# Patient Record
Sex: MALE | Race: WHITE | Employment: OTHER | URBAN - METROPOLITAN AREA
[De-identification: names, ages, dates, MRNs, and addresses within clinical notes are randomized per-mention and may not be internally consistent; named-entity substitution may affect disease eponyms.]

---

## 2019-10-05 ENCOUNTER — OFFICE VISIT (OUTPATIENT)
Dept: FAMILY MEDICINE CLINIC | Age: 59
End: 2019-10-05

## 2019-10-05 VITALS
SYSTOLIC BLOOD PRESSURE: 136 MMHG | TEMPERATURE: 98.2 F | HEART RATE: 84 BPM | BODY MASS INDEX: 20.69 KG/M2 | DIASTOLIC BLOOD PRESSURE: 88 MMHG | OXYGEN SATURATION: 97 % | WEIGHT: 131.8 LBS | HEIGHT: 67 IN

## 2019-10-05 DIAGNOSIS — J01.00 ACUTE NON-RECURRENT MAXILLARY SINUSITIS: Primary | ICD-10-CM

## 2019-10-05 PROCEDURE — 99213 OFFICE O/P EST LOW 20 MIN: CPT | Performed by: FAMILY MEDICINE

## 2019-10-05 RX ORDER — AZITHROMYCIN 250 MG/1
TABLET, FILM COATED ORAL
Qty: 1 PACKET | Refills: 0 | Status: SHIPPED | OUTPATIENT
Start: 2019-10-05 | End: 2019-10-21 | Stop reason: ALTCHOICE

## 2019-10-05 RX ORDER — PREDNISONE 1 MG/1
TABLET ORAL
Qty: 30 TABLET | Refills: 0 | Status: SHIPPED | OUTPATIENT
Start: 2019-10-05 | End: 2019-10-21 | Stop reason: ALTCHOICE

## 2019-10-05 ASSESSMENT — ENCOUNTER SYMPTOMS
GASTROINTESTINAL NEGATIVE: 1
COUGH: 1

## 2019-10-21 ENCOUNTER — OFFICE VISIT (OUTPATIENT)
Dept: FAMILY MEDICINE CLINIC | Age: 59
End: 2019-10-21

## 2019-10-21 VITALS
TEMPERATURE: 97.9 F | RESPIRATION RATE: 22 BRPM | SYSTOLIC BLOOD PRESSURE: 136 MMHG | HEART RATE: 68 BPM | OXYGEN SATURATION: 98 % | DIASTOLIC BLOOD PRESSURE: 84 MMHG | BODY MASS INDEX: 20.53 KG/M2 | WEIGHT: 130.8 LBS | HEIGHT: 67 IN

## 2019-10-21 DIAGNOSIS — J01.90 ACUTE NON-RECURRENT SINUSITIS, UNSPECIFIED LOCATION: Primary | ICD-10-CM

## 2019-10-21 DIAGNOSIS — R09.82 POSTNASAL DRIP: ICD-10-CM

## 2019-10-21 DIAGNOSIS — R07.0 PAIN IN THROAT: ICD-10-CM

## 2019-10-21 PROCEDURE — 96372 THER/PROPH/DIAG INJ SC/IM: CPT | Performed by: PHYSICIAN ASSISTANT

## 2019-10-21 PROCEDURE — 99214 OFFICE O/P EST MOD 30 MIN: CPT | Performed by: PHYSICIAN ASSISTANT

## 2019-10-21 RX ORDER — TRIAMCINOLONE ACETONIDE 40 MG/ML
40 INJECTION, SUSPENSION INTRA-ARTICULAR; INTRAMUSCULAR ONCE
Status: COMPLETED | OUTPATIENT
Start: 2019-10-21 | End: 2019-10-21

## 2019-10-21 RX ORDER — CHLORPHENIRAMINE MALEATE 4 MG/1
4 TABLET ORAL EVERY 6 HOURS PRN
Qty: 20 TABLET | Refills: 0 | Status: SHIPPED
Start: 2019-10-21 | End: 2021-04-06 | Stop reason: ALTCHOICE

## 2019-10-21 RX ORDER — AMOXICILLIN AND CLAVULANATE POTASSIUM 875; 125 MG/1; MG/1
1 TABLET, FILM COATED ORAL 2 TIMES DAILY
Qty: 20 TABLET | Refills: 0 | Status: SHIPPED | OUTPATIENT
Start: 2019-10-21 | End: 2019-10-31

## 2019-10-21 RX ORDER — FLUTICASONE PROPIONATE 50 MCG
1 SPRAY, SUSPENSION (ML) NASAL DAILY
Qty: 2 BOTTLE | Refills: 1 | Status: SHIPPED
Start: 2019-10-21 | End: 2021-04-06 | Stop reason: ALTCHOICE

## 2019-10-21 RX ADMIN — TRIAMCINOLONE ACETONIDE 40 MG: 40 INJECTION, SUSPENSION INTRA-ARTICULAR; INTRAMUSCULAR at 09:06

## 2021-04-06 ENCOUNTER — OFFICE VISIT (OUTPATIENT)
Dept: FAMILY MEDICINE CLINIC | Age: 61
End: 2021-04-06

## 2021-04-06 VITALS
HEIGHT: 67 IN | OXYGEN SATURATION: 98 % | BODY MASS INDEX: 20.09 KG/M2 | RESPIRATION RATE: 18 BRPM | SYSTOLIC BLOOD PRESSURE: 130 MMHG | TEMPERATURE: 97.7 F | WEIGHT: 128 LBS | DIASTOLIC BLOOD PRESSURE: 78 MMHG | HEART RATE: 88 BPM

## 2021-04-06 DIAGNOSIS — R05.9 COUGH: ICD-10-CM

## 2021-04-06 DIAGNOSIS — Z72.0 TOBACCO ABUSE: ICD-10-CM

## 2021-04-06 DIAGNOSIS — R09.82 POSTNASAL DRIP: ICD-10-CM

## 2021-04-06 DIAGNOSIS — J06.9 ACUTE UPPER RESPIRATORY INFECTION, UNSPECIFIED: Primary | ICD-10-CM

## 2021-04-06 DIAGNOSIS — J01.90 ACUTE NON-RECURRENT SINUSITIS, UNSPECIFIED LOCATION: ICD-10-CM

## 2021-04-06 DIAGNOSIS — J18.9 COMMUNITY ACQUIRED PNEUMONIA OF RIGHT LOWER LOBE OF LUNG: ICD-10-CM

## 2021-04-06 DIAGNOSIS — R09.81 NASAL CONGESTION: ICD-10-CM

## 2021-04-06 PROCEDURE — 3006F CXR DOC REV: CPT | Performed by: PHYSICIAN ASSISTANT

## 2021-04-06 PROCEDURE — 99214 OFFICE O/P EST MOD 30 MIN: CPT | Performed by: PHYSICIAN ASSISTANT

## 2021-04-06 RX ORDER — CHLORPHENIRAMINE MALEATE 4 MG/1
4 TABLET ORAL EVERY 6 HOURS PRN
Qty: 20 TABLET | Refills: 0 | Status: SHIPPED
Start: 2021-04-06 | End: 2021-04-20 | Stop reason: ALTCHOICE

## 2021-04-06 RX ORDER — LEVOFLOXACIN 500 MG/1
500 TABLET, FILM COATED ORAL DAILY
Qty: 10 TABLET | Refills: 0 | Status: SHIPPED | OUTPATIENT
Start: 2021-04-06 | End: 2021-04-16

## 2021-04-06 RX ORDER — BENZONATATE 100 MG/1
100 CAPSULE ORAL 3 TIMES DAILY PRN
Qty: 21 CAPSULE | Refills: 0 | Status: SHIPPED | OUTPATIENT
Start: 2021-04-06 | End: 2021-04-13

## 2021-04-06 RX ORDER — PREDNISONE 20 MG/1
TABLET ORAL
Qty: 18 TABLET | Refills: 0 | Status: SHIPPED
Start: 2021-04-06 | End: 2021-04-20 | Stop reason: ALTCHOICE

## 2021-04-06 NOTE — PROGRESS NOTES
21  Courtney Ellis : 1960 Sex: male  Age 62 y.o. Subjective:  Chief Complaint   Patient presents with    Cough     sinus congestion          HPI:   Courtney Ellis , 62 y.o. male presents to express care for evaluation of sinus congestion, drainage. The patient states that essentially since 2019 he has been dealing with this nasal congestion, drainage. He states that in the last couple of weeks it seems to have gotten worse. The patient has not been on any recent steroids, antibiotics. The patient is not any fevers or chills. Now that the weather is getting a little bit nicer it seems to be getting somewhat better but he still coughing quite a bit. He has quite a bit of chest discomfort when he coughs on the right side. The patient states that does seem to be worse when he is lying down. He is not short of breath. The patient is not having any hemoptysis. The patient is a smoker. ROS:   Unless otherwise stated in this report the patient's positive and negative responses for review of systems for constitutional, eyes, ENT, cardiovascular, respiratory, gastrointestinal, neurological, , musculoskeletal, and integument systems and related systems to the presenting problem are either stated in the history of present illness or were not pertinent or were negative for the symptoms and/or complaints related to the presenting medical problem. Positives and pertinent negatives as per HPI. All others reviewed and are negative. PMH:     Past Medical History:   Diagnosis Date    Tobacco abuse        Past Surgical History:   Procedure Laterality Date    HERNIA REPAIR      TONSILLECTOMY         History reviewed. No pertinent family history.     Medications:     Current Outpatient Medications:     levoFLOXacin (LEVAQUIN) 500 MG tablet, Take 1 tablet by mouth daily for 10 days, Disp: 10 tablet, Rfl: 0    predniSONE (DELTASONE) 20 MG tablet, 3 tablets once daily for 3 days, x4, normal speech  Psychiatric: Cooperative         Testing:     Xr Chest Standard (2 Vw)    Result Date: 4/6/2021  EXAMINATION: TWO XRAY VIEWS OF THE CHEST 4/6/2021 9:29 am COMPARISON: None. HISTORY: ORDERING SYSTEM PROVIDED HISTORY: Acute upper respiratory infection, unspecified TECHNOLOGIST PROVIDED HISTORY: Reason for exam:->cough/congestion FINDINGS: Opacity at the lung bases is greater on the right than on the left. This could represent either acute or chronic inflammatory disease. There is also some pleural thickening on the right as well. Heart and pulmonary vascularity are normal.     Lung opacity which is greatest in the right lower lung which may relate to either acute or chronic disease. Associated pleural thickening also present. Medical Decision Making:     Vital signs reviewed    Past medical history reviewed. Allergies reviewed. Medications reviewed. Patient on arrival does not appear to be in any apparent distress or discomfort. The patient has been seen and evaluated. The patient does not appear to be toxic or lethargic. The patient was sent for a chest x-ray. Did have quite a bit of rhonchi. I personally reviewed the images and there does appear to be evidence of a pneumonia in the right lower lobe area. I discussed this with the patient. The patient will be started on Levaquin, prednisone, chlorphentermine and Tessalon Perles. We will need to have him follow-up in a week for repeat chest x-ray. The patient was also encouraged to stop smoking. The patient was educated on the proper dosage of motrin and tylenol and the appropriate intervals of each. The patient is to increase fluid intake over the next several days. The patient is to use OTC decongestant as needed. The patient is to return to express care or go directly to the emergency department should any of the signs or symptoms worsen.  The patient is to followup with primary care physician in 2-3 days for repeat evaluation. The patient has no other questions or concerns at this time the patient will be discharged home. Clinical Impression:   Shea Thomas was seen today for cough. Diagnoses and all orders for this visit:    Acute upper respiratory infection, unspecified  -     XR CHEST STANDARD (2 VW); Future    Cough    Acute non-recurrent sinusitis, unspecified location    Nasal congestion    Postnasal drip    Tobacco abuse    Community acquired pneumonia of right lower lobe of lung    Other orders  -     levoFLOXacin (LEVAQUIN) 500 MG tablet; Take 1 tablet by mouth daily for 10 days  -     predniSONE (DELTASONE) 20 MG tablet; 3 tablets once daily for 3 days, 2 tablets once daily for 3 days, one tablet once daily for 3 days  -     benzonatate (TESSALON) 100 MG capsule; Take 1 capsule by mouth 3 times daily as needed for Cough  -     chlorpheniramine (ALLER-CHLOR) 4 MG tablet; Take 1 tablet by mouth every 6 hours as needed for Allergies        The patient is to call for any concerns or return if any of the signs or symptoms worsen. The patient is to follow-up with PCP in the next 2-3 days for repeat evaluation repeat assessment or go directly to the emergency department. SIGNATURE: Simone Sanabria III, PA-C    Tobacco use can lead to tobacco/nicotine dependence and serious health problems. Quitting smoking greatly reduces the risk of developing smoking-related diseases. Lowered risk for lung cancer and many other types of cancer. Reduced risk for heart disease, stroke, and peripheral vascular disease (narrowing of the blood vessels outside your heart). Reduced heart disease risk within 1 to 2 years of quitting. Reduced respiratory symptoms, such as coughing, wheezing, and shortness of breath. While these symptoms may not disappear, they do not continue to progress at the same rate among people who quit compared with those who continue to smoke.  Reduced risk of developing some lung diseases (such as

## 2021-04-20 ENCOUNTER — OFFICE VISIT (OUTPATIENT)
Dept: FAMILY MEDICINE CLINIC | Age: 61
End: 2021-04-20

## 2021-04-20 VITALS
BODY MASS INDEX: 20.09 KG/M2 | HEART RATE: 78 BPM | WEIGHT: 128 LBS | HEIGHT: 67 IN | RESPIRATION RATE: 18 BRPM | DIASTOLIC BLOOD PRESSURE: 76 MMHG | TEMPERATURE: 97.5 F | SYSTOLIC BLOOD PRESSURE: 130 MMHG | OXYGEN SATURATION: 99 %

## 2021-04-20 DIAGNOSIS — Z72.0 TOBACCO ABUSE: ICD-10-CM

## 2021-04-20 DIAGNOSIS — R91.8 OPACITIES OF BOTH LUNGS PRESENT ON CHEST X-RAY: Primary | ICD-10-CM

## 2021-04-20 DIAGNOSIS — J18.9 COMMUNITY ACQUIRED PNEUMONIA OF RIGHT LOWER LOBE OF LUNG: ICD-10-CM

## 2021-04-20 DIAGNOSIS — R05.9 COUGH: ICD-10-CM

## 2021-04-20 PROCEDURE — 99214 OFFICE O/P EST MOD 30 MIN: CPT | Performed by: PHYSICIAN ASSISTANT

## 2021-04-20 PROCEDURE — 3006F CXR DOC REV: CPT | Performed by: PHYSICIAN ASSISTANT

## 2021-04-20 RX ORDER — ALBUTEROL SULFATE 90 UG/1
2 AEROSOL, METERED RESPIRATORY (INHALATION) 4 TIMES DAILY PRN
Qty: 1 INHALER | Refills: 0 | Status: SHIPPED
Start: 2021-04-20 | End: 2021-06-14 | Stop reason: ALTCHOICE

## 2021-04-20 RX ORDER — BROMPHENIRAMINE MALEATE, PSEUDOEPHEDRINE HYDROCHLORIDE, AND DEXTROMETHORPHAN HYDROBROMIDE 2; 30; 10 MG/5ML; MG/5ML; MG/5ML
5 SYRUP ORAL 4 TIMES DAILY PRN
Qty: 120 ML | Refills: 0 | Status: SHIPPED
Start: 2021-04-20 | End: 2021-05-14

## 2021-04-20 RX ORDER — LEVOFLOXACIN 500 MG/1
500 TABLET, FILM COATED ORAL DAILY
Qty: 7 TABLET | Refills: 0 | Status: SHIPPED | OUTPATIENT
Start: 2021-04-20 | End: 2021-04-27

## 2021-04-20 NOTE — PROGRESS NOTES
21  Aminta Spurling : 1960 Sex: male  Age 62 y.o. Subjective:  Chief Complaint   Patient presents with    Cough         HPI:   Aminta Spurling , 62 y.o. male presents to [facility] care for evaluation of repeat evaluation of a pneumonia. The patient was seen and evaluated on 2021. Had a chest x-ray performed that showed evidence of right lower lobe pneumonia. The patient was placed on Levaquin. The patient states because of the mucus and increased congestion he is having a hard time swallowing the pills but he was able to complete the entire round of antibiotics. The patient did not miss any doses. The patient was given prednisone, Tessalon Perles and chlorphentermine. The patient is here for follow-up and repeat evaluation. Patient states that the cough is much better at this point. The patient is not having any back pain or flank pain. The patient has noted some nasal congestion upper respiratory symptoms waxing waning since 2019. ROS:   Unless otherwise stated in this report the patient's positive and negative responses for review of systems for constitutional, eyes, ENT, cardiovascular, respiratory, gastrointestinal, neurological, , musculoskeletal, and integument systems and related systems to the presenting problem are either stated in the history of present illness or were not pertinent or were negative for the symptoms and/or complaints related to the presenting medical problem. Positives and pertinent negatives as per HPI. All others reviewed and are negative. PMH:     Past Medical History:   Diagnosis Date    Tobacco abuse        Past Surgical History:   Procedure Laterality Date    HERNIA REPAIR      TONSILLECTOMY         History reviewed. No pertinent family history.     Medications:     Current Outpatient Medications:     albuterol sulfate  (90 Base) MCG/ACT inhaler, Inhale 2 puffs into the lungs 4 times daily as needed for Wheezing, Disp: 1 Inhaler, Rfl: 0    levoFLOXacin (LEVAQUIN) 500 MG tablet, Take 1 tablet by mouth daily for 7 days, Disp: 7 tablet, Rfl: 0    brompheniramine-pseudoephedrine-DM 2-30-10 MG/5ML syrup, Take 5 mLs by mouth 4 times daily as needed for Congestion or Cough, Disp: 120 mL, Rfl: 0    Allergies: Allergies   Allergen Reactions    Seasonal        Social History:     Social History     Tobacco Use    Smoking status: Current Every Day Smoker     Packs/day: 1.00    Smokeless tobacco: Never Used   Substance Use Topics    Alcohol use: Yes     Comment: 8 beers daily    Drug use: Yes     Types: Marijuana       Patient lives at home. Physical Exam:     Vitals:    04/20/21 0940   BP: 130/76   Pulse: 78   Resp: 18   Temp: 97.5 °F (36.4 °C)   SpO2: 99%   Weight: 128 lb (58.1 kg)   Height: 5' 7\" (1.702 m)       Exam:  Physical Exam  Nurse's notes and vital signs reviewed. The patient is not hypoxic. ? General: Alert, no acute distress, patient resting comfortably Patient is not toxic or lethargic. Skin: Warm, intact, no pallor noted. There is no evidence of rash at this time. Head: Normocephalic, atraumatic  Eye: Normal conjunctiva  Ears, Nose, Throat: Right tympanic membrane clear, left tympanic membrane clear. No drainage or discharge noted. No pre- or post-auricular tenderness, erythema, or swelling noted. Nasal congestion  Posterior oropharynx shows no erythema, tonsillar hypertrophy, or exudate. the uvula is midline. No trismus or drooling is noted. Moist mucous membranes. Neck: No anterior/posterior lymphadenopathy noted. No erythema, no masses, no fluctuance or induration noted. No meningeal signs. Cardiovascular: Regular Rate and Rhythm  Respiratory: No acute distress, rhonchi noted throughout the right mid to the right lower lobe area, no wheezing, no crackles. No stridor or retractions are noted.   Neurological: A&O x4, normal speech  Psychiatric: Cooperative         Testing:     Xr Chest Standard (2 Vw)    Result Date: 4/20/2021  EXAMINATION: TWO XRAY VIEWS OF THE CHEST 4/20/2021 9:23 am COMPARISON: None. HISTORY: ORDERING SYSTEM PROVIDED HISTORY: Cough TECHNOLOGIST PROVIDED HISTORY: Reason for exam:->follow up from [facility] FINDINGS: Stable bilateral lung opacities with a likely interstitial component. This could relate to either acute or chronic disease. Normal heart and pulmonary vascularity. Stable pleural thickening on the right. Stable bilateral lung opacities which could relate to either acute or chronic disease. Xr Chest Standard (2 Vw)    Result Date: 4/6/2021  EXAMINATION: TWO XRAY VIEWS OF THE CHEST 4/6/2021 9:29 am COMPARISON: None. HISTORY: ORDERING SYSTEM PROVIDED HISTORY: Acute upper respiratory infection, unspecified TECHNOLOGIST PROVIDED HISTORY: Reason for exam:->cough/congestion FINDINGS: Opacity at the lung bases is greater on the right than on the left. This could represent either acute or chronic inflammatory disease. There is also some pleural thickening on the right as well. Heart and pulmonary vascularity are normal.     Lung opacity which is greatest in the right lower lung which may relate to either acute or chronic disease. Associated pleural thickening also present. Medical Decision Making:     Vital signs reviewed    Past medical history reviewed. Allergies reviewed. Medications reviewed. Patient on arrival does not appear to be in any apparent distress or discomfort. The patient has been seen and evaluated. The patient does not appear to be toxic or lethargic. The patient will be sent for repeat chest x-ray. The repeat chest x-ray does show evidence of stable bilateral lung opacities. I personally reviewed the images. Does seem to be slightly more pronounced from previous. The patient has had this ongoing for quite some time. The patient will be referred to pulmonology. We will continue out the antibiotic for 7 days.     The patient is to follow-up with PCP. The patient does not have health insurance and will work on obtaining this. The patient will also be given a inhaler and Bromfed. The patient is to return to express care or go directly to the emergency department should any of the signs or symptoms worsen. The patient is to followup with primary care physician in 2-3 days for repeat evaluation. The patient has no other questions or concerns at this time the patient will be discharged home. Clinical Impression:   Robert Baez was seen today for cough. Diagnoses and all orders for this visit:    Opacities of both lungs present on chest x-ray  -     [facility] ([facility]) - Riya Perez MD, PulmonaryCarla    Cough  -     XR CHEST STANDARD (2 VW); Future    Community acquired pneumonia of right lower lobe of lung  -     [facility] ([facility]) - Riya Perez MD, PulmonaryCarla    Tobacco abuse    Other orders  -     albuterol sulfate  (90 Base) MCG/ACT inhaler; Inhale 2 puffs into the lungs 4 times daily as needed for Wheezing  -     levoFLOXacin (LEVAQUIN) 500 MG tablet; Take 1 tablet by mouth daily for 7 days  -     brompheniramine-pseudoephedrine-DM 2-30-10 MG/5ML syrup; Take 5 mLs by mouth 4 times daily as needed for Congestion or Cough        The patient is to call for any concerns or return if any of the signs or symptoms worsen. The patient is to follow-up with PCP in the next 2-3 days for repeat evaluation repeat assessment or go directly to the emergency department. SIGNATURE: Olayinka Emery III, PA-C    Tobacco use can lead to tobacco/nicotine dependence and serious health problems. Quitting smoking greatly reduces the risk of developing smoking-related diseases. Lowered risk for lung cancer and many other types of cancer. Reduced risk for heart disease, stroke, and peripheral vascular disease (narrowing of the blood vessels outside your heart). Reduced heart disease risk within 1 to 2 years of quitting.  Reduced respiratory symptoms, such as coughing, wheezing, and shortness of breath. While these symptoms may not disappear, they do not continue to progress at the same rate among people who quit compared with those who continue to smoke. Reduced risk of developing some lung diseases (such as chronic obstructive pulmonary disease, also known as COPD, one of the leading causes of death in the [facility]). Tobacco/nicotine dependence is a condition that often requires repeated treatments, but there are helpful treatments and resources for quitting.

## 2021-04-27 ENCOUNTER — TELEPHONE (OUTPATIENT)
Dept: FAMILY MEDICINE CLINIC | Age: 61
End: 2021-04-27

## 2021-04-28 DIAGNOSIS — R91.8 OPACITIES OF BOTH LUNGS PRESENT ON CHEST X-RAY: Primary | ICD-10-CM

## 2021-05-06 ENCOUNTER — TELEPHONE (OUTPATIENT)
Dept: FAMILY MEDICINE CLINIC | Age: 61
End: 2021-05-06

## 2021-05-06 NOTE — TELEPHONE ENCOUNTER
I spoke with the patient states that he is now coughing up some blood. The patient is having some back pain. The patient was to follow-up with pulmonology. The patient was encouraged to go directly to the ED for further evaluation and assessment.   Likely needs CT scan performed

## 2021-05-07 ENCOUNTER — HOSPITAL ENCOUNTER (EMERGENCY)
Age: 61
Discharge: HOME | End: 2021-05-07
Attending: EMERGENCY MEDICINE
Payer: COMMERCIAL

## 2021-05-07 ENCOUNTER — APPOINTMENT (OUTPATIENT)
Dept: CT IMAGING | Age: 61
End: 2021-05-07
Payer: COMMERCIAL

## 2021-05-07 VITALS
OXYGEN SATURATION: 98 % | HEART RATE: 70 BPM | WEIGHT: 132 LBS | DIASTOLIC BLOOD PRESSURE: 67 MMHG | HEIGHT: 67 IN | TEMPERATURE: 97.9 F | RESPIRATION RATE: 14 BRPM | BODY MASS INDEX: 20.72 KG/M2 | SYSTOLIC BLOOD PRESSURE: 138 MMHG

## 2021-05-07 DIAGNOSIS — Z20.822 COVID-19 VIRUS NOT DETECTED: ICD-10-CM

## 2021-05-07 DIAGNOSIS — R91.8 LUNG MASS: Primary | ICD-10-CM

## 2021-05-07 DIAGNOSIS — R59.0 SUPRACLAVICULAR LYMPHADENOPATHY: ICD-10-CM

## 2021-05-07 DIAGNOSIS — Z72.0 TOBACCO ABUSE: ICD-10-CM

## 2021-05-07 LAB
ALBUMIN SERPL-MCNC: 3.8 G/DL (ref 3.5–5.2)
ALP BLD-CCNC: 102 U/L (ref 40–129)
ALT SERPL-CCNC: 10 U/L (ref 0–40)
ANION GAP SERPL CALCULATED.3IONS-SCNC: 9 MMOL/L (ref 7–16)
AST SERPL-CCNC: 22 U/L (ref 0–39)
BASOPHILS ABSOLUTE: 0.04 E9/L (ref 0–0.2)
BASOPHILS RELATIVE PERCENT: 0.7 % (ref 0–2)
BILIRUB SERPL-MCNC: 0.5 MG/DL (ref 0–1.2)
BILIRUBIN URINE: NEGATIVE
BLOOD, URINE: NEGATIVE
BUN BLDV-MCNC: 7 MG/DL (ref 6–20)
CALCIUM SERPL-MCNC: 9.3 MG/DL (ref 8.6–10.2)
CHLORIDE BLD-SCNC: 90 MMOL/L (ref 98–107)
CLARITY: CLEAR
CO2: 30 MMOL/L (ref 22–29)
COLOR: YELLOW
CREAT SERPL-MCNC: 0.6 MG/DL (ref 0.7–1.2)
EOSINOPHILS ABSOLUTE: 0.15 E9/L (ref 0.05–0.5)
EOSINOPHILS RELATIVE PERCENT: 2.5 % (ref 0–6)
GFR AFRICAN AMERICAN: >60
GFR NON-AFRICAN AMERICAN: >60 ML/MIN/1.73
GLUCOSE BLD-MCNC: 90 MG/DL (ref 74–99)
GLUCOSE URINE: NEGATIVE MG/DL
HCT VFR BLD CALC: 41.2 % (ref 37–54)
HEMOGLOBIN: 13.9 G/DL (ref 12.5–16.5)
IMMATURE GRANULOCYTES #: 0.03 E9/L
IMMATURE GRANULOCYTES %: 0.5 % (ref 0–5)
INR BLD: 1.1
KETONES, URINE: NEGATIVE MG/DL
LACTIC ACID: 1.1 MMOL/L (ref 0.5–2.2)
LEUKOCYTE ESTERASE, URINE: NEGATIVE
LIPASE: 12 U/L (ref 13–60)
LYMPHOCYTES ABSOLUTE: 1.66 E9/L (ref 1.5–4)
LYMPHOCYTES RELATIVE PERCENT: 28.2 % (ref 20–42)
MCH RBC QN AUTO: 29.6 PG (ref 26–35)
MCHC RBC AUTO-ENTMCNC: 33.7 % (ref 32–34.5)
MCV RBC AUTO: 87.8 FL (ref 80–99.9)
MONOCYTES ABSOLUTE: 0.69 E9/L (ref 0.1–0.95)
MONOCYTES RELATIVE PERCENT: 11.7 % (ref 2–12)
NEUTROPHILS ABSOLUTE: 3.32 E9/L (ref 1.8–7.3)
NEUTROPHILS RELATIVE PERCENT: 56.4 % (ref 43–80)
NITRITE, URINE: NEGATIVE
PDW BLD-RTO: 11.9 FL (ref 11.5–15)
PH UA: 7 (ref 5–9)
PLATELET # BLD: 272 E9/L (ref 130–450)
PMV BLD AUTO: 7.5 FL (ref 7–12)
POTASSIUM REFLEX MAGNESIUM: 4.6 MMOL/L (ref 3.5–5)
PROTEIN UA: NEGATIVE MG/DL
PROTHROMBIN TIME: 11.7 SEC (ref 9.3–12.4)
RBC # BLD: 4.69 E12/L (ref 3.8–5.8)
SARS-COV-2, NAAT: NOT DETECTED
SODIUM BLD-SCNC: 129 MMOL/L (ref 132–146)
SPECIFIC GRAVITY UA: 1.01 (ref 1–1.03)
TOTAL PROTEIN: 7.8 G/DL (ref 6.4–8.3)
TROPONIN: <0.01 NG/ML (ref 0–0.03)
UROBILINOGEN, URINE: 0.2 E.U./DL
WBC # BLD: 5.9 E9/L (ref 4.5–11.5)

## 2021-05-07 PROCEDURE — 96374 THER/PROPH/DIAG INJ IV PUSH: CPT

## 2021-05-07 PROCEDURE — 84484 ASSAY OF TROPONIN QUANT: CPT

## 2021-05-07 PROCEDURE — 99284 EMERGENCY DEPT VISIT MOD MDM: CPT

## 2021-05-07 PROCEDURE — 36415 COLL VENOUS BLD VENIPUNCTURE: CPT

## 2021-05-07 PROCEDURE — 71275 CT ANGIOGRAPHY CHEST: CPT

## 2021-05-07 PROCEDURE — 6360000002 HC RX W HCPCS: Performed by: EMERGENCY MEDICINE

## 2021-05-07 PROCEDURE — 6360000004 HC RX CONTRAST MEDICATION: Performed by: RADIOLOGY

## 2021-05-07 PROCEDURE — 85025 COMPLETE CBC W/AUTO DIFF WBC: CPT

## 2021-05-07 PROCEDURE — 2580000003 HC RX 258: Performed by: EMERGENCY MEDICINE

## 2021-05-07 PROCEDURE — 94664 DEMO&/EVAL PT USE INHALER: CPT

## 2021-05-07 PROCEDURE — 93005 ELECTROCARDIOGRAM TRACING: CPT | Performed by: EMERGENCY MEDICINE

## 2021-05-07 PROCEDURE — 87040 BLOOD CULTURE FOR BACTERIA: CPT

## 2021-05-07 PROCEDURE — 83605 ASSAY OF LACTIC ACID: CPT

## 2021-05-07 PROCEDURE — 94640 AIRWAY INHALATION TREATMENT: CPT

## 2021-05-07 PROCEDURE — 85610 PROTHROMBIN TIME: CPT

## 2021-05-07 PROCEDURE — 81003 URINALYSIS AUTO W/O SCOPE: CPT

## 2021-05-07 PROCEDURE — 87635 SARS-COV-2 COVID-19 AMP PRB: CPT

## 2021-05-07 PROCEDURE — 6370000000 HC RX 637 (ALT 250 FOR IP): Performed by: EMERGENCY MEDICINE

## 2021-05-07 PROCEDURE — 83690 ASSAY OF LIPASE: CPT

## 2021-05-07 PROCEDURE — 80053 COMPREHEN METABOLIC PANEL: CPT

## 2021-05-07 RX ORDER — METHYLPREDNISOLONE SODIUM SUCCINATE 125 MG/2ML
125 INJECTION, POWDER, LYOPHILIZED, FOR SOLUTION INTRAMUSCULAR; INTRAVENOUS ONCE
Status: COMPLETED | OUTPATIENT
Start: 2021-05-07 | End: 2021-05-07

## 2021-05-07 RX ORDER — 0.9 % SODIUM CHLORIDE 0.9 %
1000 INTRAVENOUS SOLUTION INTRAVENOUS ONCE
Status: COMPLETED | OUTPATIENT
Start: 2021-05-07 | End: 2021-05-07

## 2021-05-07 RX ORDER — IPRATROPIUM BROMIDE AND ALBUTEROL SULFATE 2.5; .5 MG/3ML; MG/3ML
3 SOLUTION RESPIRATORY (INHALATION) ONCE
Status: COMPLETED | OUTPATIENT
Start: 2021-05-07 | End: 2021-05-07

## 2021-05-07 RX ADMIN — IOPAMIDOL 75 ML: 755 INJECTION, SOLUTION INTRAVENOUS at 11:01

## 2021-05-07 RX ADMIN — IPRATROPIUM BROMIDE AND ALBUTEROL SULFATE 3 AMPULE: .5; 3 SOLUTION RESPIRATORY (INHALATION) at 10:34

## 2021-05-07 RX ADMIN — SODIUM CHLORIDE 1000 ML: 9 INJECTION, SOLUTION INTRAVENOUS at 10:22

## 2021-05-07 RX ADMIN — METHYLPREDNISOLONE SODIUM SUCCINATE 125 MG: 125 INJECTION, POWDER, FOR SOLUTION INTRAMUSCULAR; INTRAVENOUS at 10:22

## 2021-05-07 NOTE — ED PROVIDER NOTES
Negative Negative mg/dL    Urobilinogen, Urine 0.2 <2.0 E.U./dL    Nitrite, Urine Negative Negative    Leukocyte Esterase, Urine Negative Negative   Troponin   Result Value Ref Range    Troponin <0.01 0.00 - 0.03 ng/mL   EKG 12 Lead   Result Value Ref Range    Ventricular Rate 68 BPM    Atrial Rate 68 BPM    P-R Interval 166 ms    QRS Duration 96 ms    Q-T Interval 406 ms    QTc Calculation (Bazett) 431 ms    P Axis 78 degrees    R Axis 101 degrees    T Axis 66 degrees   ,       RADIOLOGY:  Interpreted by Radiologist unless otherwise specified  CTA PULMONARY W CONTRAST   Final Result   No evidence of pulmonary embolism. Right lower lobe/right hilar mass measuring approximately 5 cm with multiple   enlarged mediastinal lymph nodes. Findings are concerning for neoplastic   disease. Further evaluation with PET-CT or image guided biopsy recommended. Severe bilateral emphysema. Moderate right-sided pleural effusion.                          ------------------------- NURSING NOTES AND VITALS REVIEWED ---------------------------   The nursing notes within the ED encounter and vital signs as below have been reviewed by myself  /85   Pulse 69   Temp 97.9 °F (36.6 °C)   Resp 12   Ht 5' 7\" (1.702 m)   Wt 132 lb (59.9 kg)   SpO2 98%   BMI 20.67 kg/m²     Oxygen Saturation Interpretation: Normal    The cardiac monitor revealed NSR with a heart rate in the 80s as interpreted by me. The cardiac monitor was ordered secondary to the patient's heart rate and to monitor the patient for dysrhythmia. CPT 18756    The patients available past medical records and past encounters were reviewed.         ------------------------------ ED COURSE/MEDICAL DECISION MAKING----------------------  Medications   methylPREDNISolone sodium (SOLU-MEDROL) injection 125 mg (125 mg Intravenous Given 5/7/21 1022)   ipratropium-albuterol (DUONEB) nebulizer solution 3 ampule (3 ampules Inhalation Given 5/7/21 1034)   0.9 % software.  Every effort was made to ensure accuracy; however, inadvertent computerized transcription errors may be present       José Miguel Route, DO  05/07/21 0790

## 2021-05-08 LAB
EKG ATRIAL RATE: 68 BPM
EKG P AXIS: 78 DEGREES
EKG P-R INTERVAL: 166 MS
EKG Q-T INTERVAL: 406 MS
EKG QRS DURATION: 96 MS
EKG QTC CALCULATION (BAZETT): 431 MS
EKG R AXIS: 101 DEGREES
EKG T AXIS: 66 DEGREES
EKG VENTRICULAR RATE: 68 BPM

## 2021-05-11 ENCOUNTER — TELEPHONE (OUTPATIENT)
Dept: FAMILY MEDICINE CLINIC | Age: 61
End: 2021-05-11

## 2021-05-11 NOTE — TELEPHONE ENCOUNTER
Provider called patient to check on how he was doing after recent ER visit. Patient states he will be seeing Pulmonology tomorrow 5/12/21. We offered to have the patient establish with a PCP here in the office, he declined and will follow up with Dr. Darren Dillard. Patient advised to contact us if needed.

## 2021-05-12 ENCOUNTER — OFFICE VISIT (OUTPATIENT)
Dept: PULMONOLOGY | Age: 61
End: 2021-05-12
Payer: COMMERCIAL

## 2021-05-12 DIAGNOSIS — J90 PLEURAL EFFUSION: Primary | ICD-10-CM

## 2021-05-12 DIAGNOSIS — R91.8 LUNG MASS: ICD-10-CM

## 2021-05-12 LAB
BLOOD CULTURE, ROUTINE: NORMAL
CULTURE, BLOOD 2: NORMAL

## 2021-05-12 PROCEDURE — 99203 OFFICE O/P NEW LOW 30 MIN: CPT | Performed by: INTERNAL MEDICINE

## 2021-05-12 PROCEDURE — G8420 CALC BMI NORM PARAMETERS: HCPCS | Performed by: INTERNAL MEDICINE

## 2021-05-12 PROCEDURE — 4004F PT TOBACCO SCREEN RCVD TLK: CPT | Performed by: INTERNAL MEDICINE

## 2021-05-12 PROCEDURE — 3017F COLORECTAL CA SCREEN DOC REV: CPT | Performed by: INTERNAL MEDICINE

## 2021-05-12 PROCEDURE — G8428 CUR MEDS NOT DOCUMENT: HCPCS | Performed by: INTERNAL MEDICINE

## 2021-05-12 PROCEDURE — 99204 OFFICE O/P NEW MOD 45 MIN: CPT | Performed by: INTERNAL MEDICINE

## 2021-05-12 NOTE — PROGRESS NOTES
Patient will be scheduled for a thoracentesis first then a bronchoscopy with EBUS after. Patient will need COVID test prior to thoracentesis and bronch. Patient was agreeable with the treatment. Patient not taking any anti coagulation medications.

## 2021-05-12 NOTE — PROGRESS NOTES
Pulmonary [facility]                             Pulmonary Consult/Progress Note :          Patient: Santo Lewis  MRN: [id number]  : 1960              Reason for Consultation:Lung mass  CC : SOB   HPI:   Santo Lewis is a 62y.o. year old who smoke for 40 years and he smoke 1 pack daily ,40 PPY and now down to 1/2 pack daily   He was in ER last with SOB and he had CT chest and shows lung mass with effusion     He had small streak of bloods with sputum and some times small spots of bright blood     Patient also was treated with antibiotics in urgent cares and course of steroids as he state and he felt much better    He denies any further hemoptysis he denies any chest pain his shortness of breath is at baseline    He is using albuterol as needed    PAST MEDICAL HISTORY:   Past Medical History:   Diagnosis Date    Tobacco abuse        PAST SURGICAL HISTORY:   Past Surgical History:   Procedure Laterality Date    HERNIA REPAIR      TONSILLECTOMY         FAMILY HISTORY:   No family history on file.     SOCIAL HISTORY:   Social History     Socioeconomic History    Marital status:      Spouse name: Not on file    Number of children: Not on file    Years of education: Not on file    Highest education level: Not on file   Occupational History    Not on file   Social Needs    Financial resource strain: Not on file    Food insecurity     Worry: Not on file     Inability: Not on file    Transportation needs     Medical: Not on file     Non-medical: Not on file   Tobacco Use    Smoking status: Current Every Day Smoker     Packs/day: 1.00    Smokeless tobacco: Never Used   Substance and Sexual Activity    Alcohol use: Yes     Comment: 8 beers daily    Drug use: Yes     Types: Marijuana    Sexual activity: Not on file   Lifestyle    Physical activity     Days per week: Not on file     Minutes per session: Not on file    Stress: Not on file Relationships    Social connections     Talks on phone: Not on file     Gets together: Not on file     Attends Faith service: Not on file     Active member of club or organization: Not on file     Attends meetings of clubs or organizations: Not on file     Relationship status: Not on file    Intimate partner violence     Fear of current or ex partner: Not on file     Emotionally abused: Not on file     Physically abused: Not on file     Forced sexual activity: Not on file   Other Topics Concern    Not on file   Social History Narrative    Not on file     Social History     Tobacco Use   Smoking Status Current Every Day Smoker    Packs/day: 1.00   Smokeless Tobacco Never Used     Social History     Substance and Sexual Activity   Alcohol Use Yes    Comment: 8 beers daily     Social History     Substance and Sexual Activity   Drug Use Yes    Types: Marijuana           HOME MEDICATIONS:  Prior to Admission medications    Medication Sig Start Date End Date Taking? Authorizing Provider   albuterol sulfate  (90 Base) MCG/ACT inhaler Inhale 2 puffs into the lungs 4 times daily as needed for Wheezing 4/20/21   WANDY Cleaning III   brompheniramine-pseudoephedrine-DM 2-30-10 MG/5ML syrup Take 5 mLs by mouth 4 times daily as needed for Congestion or Cough 4/20/21   WANDY Cleaning III       CURRENT MEDICATIONS:  No current facility-administered medications for this visit.      IV MEDICATIONS:      ALLERGIES:  Allergies   Allergen Reactions    Seasonal        REVIEW OF SYSTEMS:  General ROS:  No weight loss ,no fatigue     ENT ROS:   No Sore throat ,no lymphoadenopathy,no nasal stuffiness     Hematological and Lymphatic ROS:   No ecchymosis ,no tendency to bleed  Respiratory ROS:   Shortness of breath  Cardiovascular ROS:   No CP,No Palpitation   Gastrointestinal ROS:   No Gi bleed,no nausea or vomiting      - Musculoskeletal ROS:      - no joint swelling ,no joint pain   Neurological ROS:     -no regarding smoking and the risk of Lung cancer and COPD and respiratory failure           Thank you very much for allowing me to participate in the care of this pleasant patient , should you have any questions ,please do not hesitate to contact me      Dudley Dong  Pulmonary&Critical Care Medicine   Director of [address] [place] [address] Director of [address] [place] [address]   Professor Katerin Ontiveros    NOTE: This report was transcribed using voice recognition software. Every effort was made to ensure accuracy; however, inadvertent computerized transcription errors may be present.

## 2021-05-14 RX ORDER — IBUPROFEN 200 MG
200 TABLET ORAL EVERY 6 HOURS PRN
COMMUNITY
End: 2021-05-28

## 2021-05-14 NOTE — PROGRESS NOTES
[address] 37 PRE-ADMISSION TESTING GENERAL INSTRUCTIONS- [facility]-phone number:[phone]    GENERAL INSTRUCTIONS  [x] Antibacterial Soap shower Night before and/or AM of Surgery  [] Felipe wipe instruction sheet and wipes given. [] Nothing by mouth after midnight, including gum, candy, mints, or water.  [] You may brush your teeth, gargle, but do NOT swallow water. []Hibiclens shower  the night before and the morning of surgery. Do not use             Hibiclens on your face or head. [x]No smoking, chewing tobacco, illegal drugs, or alcohol within 24 hours of your surgery. [] Jewelry, valuables or body piercing's should not be brought to the hospital. All body and/or tongue piercing's must be removed prior to arriving to hospital.  ALL hair pins must be removed. [] Do not wear makeup, lotions, powders, deodorant. Nail polish as directed by the nurse. [] Arrange transportation with a responsible adult  to and from the hospital. If you do not have a responsible adult  to transport you, you will need to make arrangements with a medical transportation company (i.e. [employer]. A Uber/taxi/bus is not appropriate unless you are accompanied by a responsible adult ). Arrange for someone to be with you for the remainder of the day and for 24 hours after your procedure due to having had anesthesia. Who will be your  for transportation?______local____________   Who will be staying with you for 24 hrs after your procedure?__________________  [] Bring insurance card and photo ID.  [] Transfusion Bracelet: Please bring with you to hospital, day of surgery  [] Bring urine specimen day of surgery. Any small container is acceptable. [x] Use inhalers the morning of surgery and bring with you to hospital.  [] Bring copy of living will or healthcare power of  papers to be placed in your electronic record.   [] CPAP/BI-PAP: Please bring your machine if you are to spend the night in the hospital.     PARKING INSTRUCTIONS:   [x] Arrival Time:______0900 needs rapid covid_______  · [x] Parking lot '\"I\"  is located on [facility] (the corner of [facility] and [facility]). To enter, press the button and the gate will lift. A free token will be provided to exit the lot. One car per patient is allowed to park in this lot. All other cars are to park on [address] [place] [address] either in the parking garage or the handicap lot. [] To reach the [facility] lobby from [address] [place] [address], upon entering the hospital, take elevator B to the 3rd floor. EDUCATION INSTRUCTIONS:      [] Knee or hip replacement booklet & exercise pamphlets given. [] Avi 77 placed in chart. [] Pre-admission Testing educational folder given  [] Incentive Spirometry,coughing & deep breathing exercises reviewed. []Medication information sheet(s)   []Fluoroscopy-Xray used in surgery reviewed with patient. Educational pamphlet placed in chart. []Pain: Post-op pain is normal and to be expected. You will be asked to rate your pain from 0-10(a zero is not acceptable-education is needed). Your post-op pain goal is:  [] Ask your nurse for your pain medication. [] Joint camp offered. [] Joint replacement booklets given. [] Other:___________________________    MEDICATION INSTRUCTIONS:   []Bring a complete list of your medications, please write the last time you took the medicine, give this list to the nurse.   [] Take the following medications the morning of surgery with 1-2 ounces of water:   [] Stop herbal supplements and vitamins 5 days before your surgery. [] DO NOT take any diabetic medicine the morning of surgery. Follow instructions for insulin the day before surgery. [] If you are diabetic and your blood sugar is low or you feel symptomatic, you may drink 1-2 ounces of apple juice or take a glucose tablet.   The morning of your procedure, you may call the pre-op area if you have concerns about your blood sugar [phone]. [x] Use your inhalers the morning of surgery. Bring your emergency inhaler with you day of surgery. [x] Follow physician instructions regarding any blood thinners you may be taking. Stop ibuprofen  WHAT TO EXPECT:  [x] The day of surgery you will be greeted and checked in by the [employer].  In addition, you will be registered in the [facility] by a Patient Access Representative. Please bring your photo ID and insurance card. A nurse will greet you in accordance to the time you are needed in the pre-op area to prepare you for surgery. Please do not be discouraged if you are not greeted in the order you arrive as there are many variables that are involved in patient preparation. Your patience is greatly appreciated as you wait for your nurse. Please bring in items such as: books, magazines, newspapers, electronics, or any other items  to occupy your time in the waiting area. []  Delays may occur with surgery and staff will make a sincere effort to keep you informed of delays. If any delays occur with your procedure, we apologize ahead of time for your inconvenience as we recognize the value of your time.

## 2021-05-17 ENCOUNTER — APPOINTMENT (OUTPATIENT)
Dept: GENERAL RADIOLOGY | Age: 61
End: 2021-05-17
Attending: INTERNAL MEDICINE
Payer: COMMERCIAL

## 2021-05-17 ENCOUNTER — HOSPITAL ENCOUNTER (OUTPATIENT)
Age: 61
Setting detail: OUTPATIENT SURGERY
Discharge: HOME | End: 2021-05-17
Attending: INTERNAL MEDICINE | Admitting: INTERNAL MEDICINE
Payer: COMMERCIAL

## 2021-05-17 ENCOUNTER — HOSPITAL ENCOUNTER (OUTPATIENT)
Dept: ULTRASOUND IMAGING | Age: 61
Discharge: HOME | End: 2021-05-19
Payer: COMMERCIAL

## 2021-05-17 VITALS
WEIGHT: 132 LBS | DIASTOLIC BLOOD PRESSURE: 71 MMHG | RESPIRATION RATE: 20 BRPM | BODY MASS INDEX: 20.72 KG/M2 | OXYGEN SATURATION: 96 % | HEIGHT: 67 IN | TEMPERATURE: 97.6 F | SYSTOLIC BLOOD PRESSURE: 105 MMHG | HEART RATE: 80 BPM

## 2021-05-17 DIAGNOSIS — J90 PLEURAL EFFUSION: ICD-10-CM

## 2021-05-17 DIAGNOSIS — Z01.812 PRE-PROCEDURE LAB EXAM: Primary | ICD-10-CM

## 2021-05-17 LAB
FLUID TYPE: NORMAL
GLUCOSE, FLUID: 106 MG/DL
LD, FLUID: 172 U/L
PROTEIN FLUID: 5.2 G/DL
SARS-COV-2, NAAT: NOT DETECTED

## 2021-05-17 PROCEDURE — 87205 SMEAR GRAM STAIN: CPT

## 2021-05-17 PROCEDURE — 7100000011 HC PHASE II RECOVERY - ADDTL 15 MIN: Performed by: INTERNAL MEDICINE

## 2021-05-17 PROCEDURE — 87070 CULTURE OTHR SPECIMN AEROBIC: CPT

## 2021-05-17 PROCEDURE — 2709999900 HC NON-CHARGEABLE SUPPLY: Performed by: INTERNAL MEDICINE

## 2021-05-17 PROCEDURE — 88112 CYTOPATH CELL ENHANCE TECH: CPT

## 2021-05-17 PROCEDURE — 7100000010 HC PHASE II RECOVERY - FIRST 15 MIN

## 2021-05-17 PROCEDURE — 84157 ASSAY OF PROTEIN OTHER: CPT

## 2021-05-17 PROCEDURE — 87116 MYCOBACTERIA CULTURE: CPT

## 2021-05-17 PROCEDURE — 88305 TISSUE EXAM BY PATHOLOGIST: CPT

## 2021-05-17 PROCEDURE — 87635 SARS-COV-2 COVID-19 AMP PRB: CPT

## 2021-05-17 PROCEDURE — 71045 X-RAY EXAM CHEST 1 VIEW: CPT

## 2021-05-17 PROCEDURE — 32555 ASPIRATE PLEURA W/ IMAGING: CPT | Performed by: INTERNAL MEDICINE

## 2021-05-17 PROCEDURE — 32555 ASPIRATE PLEURA W/ IMAGING: CPT

## 2021-05-17 PROCEDURE — 83615 LACTATE (LD) (LDH) ENZYME: CPT

## 2021-05-17 PROCEDURE — 82947 ASSAY GLUCOSE BLOOD QUANT: CPT

## 2021-05-17 PROCEDURE — 7100000010 HC PHASE II RECOVERY - FIRST 15 MIN: Performed by: INTERNAL MEDICINE

## 2021-05-17 PROCEDURE — 3609027000 HC THORACENTESIS W/ULTRASOUND: Performed by: INTERNAL MEDICINE

## 2021-05-17 PROCEDURE — 87206 SMEAR FLUORESCENT/ACID STAI: CPT

## 2021-05-17 PROCEDURE — 2500000003 HC RX 250 WO HCPCS: Performed by: INTERNAL MEDICINE

## 2021-05-17 PROCEDURE — C1729 CATH, DRAINAGE: HCPCS | Performed by: INTERNAL MEDICINE

## 2021-05-17 PROCEDURE — 7100000011 HC PHASE II RECOVERY - ADDTL 15 MIN

## 2021-05-17 PROCEDURE — 87015 SPECIMEN INFECT AGNT CONCNTJ: CPT

## 2021-05-17 RX ORDER — LIDOCAINE HYDROCHLORIDE 10 MG/ML
INJECTION, SOLUTION EPIDURAL; INFILTRATION; INTRACAUDAL; PERINEURAL PRN
Status: DISCONTINUED | OUTPATIENT
Start: 2021-05-17 | End: 2021-05-17 | Stop reason: ALTCHOICE

## 2021-05-17 ASSESSMENT — PAIN SCALES - GENERAL: PAINLEVEL_OUTOF10: 0

## 2021-05-17 NOTE — H&P
Determinants of Health     Financial Resource Strain:     Difficulty of Paying Living Expenses:    Food Insecurity:     Worried About Running Out of Food in the Last Year:     [address] in the Last Year:    Transportation Needs:     Lack of Transportation (Medical):  Lack of Transportation (Non-Medical):    Physical Activity:     Days of Exercise per Week:     Minutes of Exercise per Session:    Stress:     Feeling of Stress :    Social Connections:     Frequency of Communication with Friends and Family:     Frequency of Social Gatherings with Friends and Family:     Attends [employer] Services:     Active Member of Clubs or Organizations:     Attends Club or Organization Meetings:     Marital Status:    Intimate Partner Violence:     Fear of Current or Ex-Partner:     Emotionally Abused:     Physically Abused:     Sexually Abused:      Social History     Tobacco Use   Smoking Status Current Every Day Smoker    Packs/day: 0.50   Smokeless Tobacco Never Used   Tobacco Comment    was 1 pack daily     Social History     Substance and Sexual Activity   Alcohol Use Yes    Alcohol/week: 25.0 standard drinks    Types: 25 Cans of beer per week    Comment: daily 5 beers now was more     Social History     Substance and Sexual Activity   Drug Use Yes    Types: Marijuana    Comment: daily           HOME MEDICATIONS:  Prior to Admission medications    Medication Sig Start Date End Date Taking?  Authorizing Provider   ibuprofen (ADVIL;MOTRIN) 200 MG tablet Take 200 mg by mouth every 6 hours as needed for Pain STOP PREOP MED   Yes Historical Provider, MD   Loratadine (CLARITIN PO) Take by mouth   Yes Historical Provider, MD   albuterol sulfate  (90 Base) MCG/ACT inhaler Inhale 2 puffs into the lungs 4 times daily as needed for Wheezing 4/20/21  Yes WANDY Marroquin III       CURRENT MEDICATIONS:  Current Facility-Administered Medications: lidocaine PF 1 % injection, , , PRN    IV MEDICATIONS:      ALLERGIES:  No Known Allergies    REVIEW OF SYSTEMS:  General ROS:  No weight loss ,no fatigue     ENT ROS:   No Sore throat ,no lymphoadenopathy,no nasal stuffiness     Hematological and Lymphatic ROS:   No ecchymosis ,no tendency to bleed  Respiratory ROS:   Shortness of breath  Cardiovascular ROS:   No CP,No Palpitation   Gastrointestinal ROS:   No Gi bleed,no nausea or vomiting      - Musculoskeletal ROS:      - no joint swelling ,no joint pain   Neurological ROS:     -no weakness or numbness    Dermatological ROS:   No skin rash ,no urticaria     PHYSICAL EXAMINATION:     VITAL SIGNS:  /68   Pulse 84   Temp 98 °F (36.7 °C) (Temporal)   Resp 20   Ht 5' 7\" (1.702 m)   Wt 132 lb (59.9 kg)   SpO2 100%   BMI 20.67 kg/m²   Wt Readings from Last 3 Encounters:   05/17/21 132 lb (59.9 kg)   05/07/21 132 lb (59.9 kg)   04/20/21 128 lb (58.1 kg)     Temp Readings from Last 3 Encounters:   05/17/21 98 °F (36.7 °C) (Temporal)   05/07/21 97.9 °F (36.6 °C)   04/20/21 97.5 °F (36.4 °C)     TMAX:  BP Readings from Last 3 Encounters:   05/17/21 108/68   05/07/21 138/67   04/20/21 130/76     Pulse Readings from Last 3 Encounters:   05/17/21 84   05/07/21 70   04/20/21 78           INTAKE/OUTPUTS:  No intake/output data recorded.   No intake or output data in the 24 hours ending 05/17/21 1250    General Appearance: alert and oriented to person, place and time, well-developed and   well-nourished, in no acute distress   Eyes: pupils equal, round, and reactive to light, extraocular eye movements intact, conjunctivae normal and sclera anicteric   Neck: neck supple and non tender without mass, no thyromegaly, no thyroid nodules and no cervical adenopathy   Pulmonary/Chest: Rhonchi bilaterally  Cardiovascular: normal rate, regular rhythm, normal S1 and S2, no murmurs, rubs, clicks or gallops, distal pulses intact, no carotid bruits, no murmurs, no gallops, no carotid bruits and no JVD   Abdomen: obese, soft, non-tender, non-distended, normal bowel sounds, no masses or organomegaly   Extremities: No edema or cyanosis  Musculoskeletal: normal range of motion, no joint swelling, deformity or tenderness   Neurologic: reflexes normal and symmetric, no cranial nerve deficit noted    LABS/IMAGING:    CBC:  Lab Results   Component Value Date    WBC 5.9 05/07/2021    HGB 13.9 05/07/2021    HCT 41.2 05/07/2021    MCV 87.8 05/07/2021     05/07/2021    LYMPHOPCT 28.2 05/07/2021    RBC 4.69 05/07/2021    MCH 29.6 05/07/2021    MCHC 33.7 05/07/2021    RDW 11.9 05/07/2021    NEUTOPHILPCT 56.4 05/07/2021    MONOPCT 11.7 05/07/2021    BASOPCT 0.7 05/07/2021    NEUTROABS 3.32 05/07/2021    LYMPHSABS 1.66 05/07/2021    MONOSABS 0.69 05/07/2021    EOSABS 0.15 05/07/2021    BASOSABS 0.04 05/07/2021       Recent Labs     05/07/21  0927   WBC 5.9   HGB 13.9   HCT 41.2   MCV 87.8          BMP:   No results for input(s): NA, K, CL, CO2, PHOS, BUN, CREATININE in the last 72 hours. Invalid input(s): CA    MG: No results found for: MG  Ca/Phos:   Lab Results   Component Value Date    CALCIUM 9.3 05/07/2021     Amylase: No results found for: AMYLASE  Lipase:   Lab Results   Component Value Date    LIPASE 12 (L) 05/07/2021     LIVER PROFILE: No results for input(s): AST, ALT, LIPASE, BILIDIR, BILITOT, ALKPHOS in the last 72 hours. Invalid input(s): AMYLASE,  ALB    PT/INR: No results for input(s): PROTIME, INR in the last 72 hours. APTT: No results for input(s): APTT in the last 72 hours. Cardiac Enzymes:  Lab Results   Component Value Date    TROPONINI <0.01 05/07/2021                   PROBLEM LIST:  Patient Active Problem List   Diagnosis    Acute non-recurrent maxillary sinusitis               ASSESSMENT:  1.) Right hilar mass  2.)Subcarinal mass/lymph node  3.)Pleural effusion   4.)severe emphysema   5. )COPD      PLAN:  *-Patient with significant history of smoking, severe emphysema, right hilar mass I have no doubt that it is malignant, also some adenopathy subcarinal and R4, with pleural effusion we will start with diagnostic thoracentesis as that give us higher stage and diagnosis if the fluid is negative then I will proceed with bronchoscopy and also will proceed with EBUS most likely to assess for endobronchial lesion and for the cause of his hemoptysis so we will start with diagnostic thoracentesis on Monday and then hopefully after that we will do bronc and EBUS if needed    *-Continue his albuterol  *-We will start trilogy down the road and we will get PFT  *-I spent 4-6 minutes counseling patient regarding smoking and the risk of Lung cancer and COPD and respiratory failure           Thank you very much for allowing me to participate in the care of this pleasant patient , should you have any questions ,please do not hesitate to contact me      Franco Morris MD,[facility]  Pulmonary&Critical Care Medicine   Director of [address] [place] [address] Director of [address] [place] [address]    Morgan Pascual    NOTE: This report was transcribed using voice recognition software. Every effort was made to ensure accuracy; however, inadvertent computerized transcription errors may be present.

## 2021-05-17 NOTE — PROGRESS NOTES
Patient given discharge instructions & verbalized understanding. Waiting on CXR to discharge patient.

## 2021-05-17 NOTE — PROCEDURES
Ultrasound guided Thoracentesis Procedure Note        DATE:  5/17/ 2021    INDICTATIONS: pleural effusion found on imaging. PRE - OPERATIVE DIAGNOSIS:  Pleural Effusion    POST - OPERATIVE DIAGNOSIS:  Right  Pleural Effusion    PERFORMED By:  Melinda Myers MD    ASSISTANT(S):  US Technician     CONSCENT:  Verbal consent obtained. Written consent obtained. After informed consent & appropriate time out protocol was noted & obtained. Risks/Benefits/Alternatives of the procedure were discussed including: infection, bleeding, pain, & pneumothorax. THORACENTESIS PROCEDURE DETAILS:  Patient understanding: patient states understanding of the procedure being performed. Time out: Immediately prior to procedure a \"time out\" was called to verify the correct. Patient was placed in a sitting position and ultrasound was done and site of maximum fluid collection was marked. PREPARATION: Patient was prepped and draped in the usual sterile fashion. ANESTHESIA: Lidocaine 1% without epinephrine, amount varied for local control. PROCEDURE NOTE: The patient was placed in the sitting position. US was then used to ludwig the fluid and depth was determined. Then the skin was prepped with Chloraprep solution and draped with sterile covers. For the procedure we used 1% buffered lidocaine to anesthetize the skin, subcutaneous tissue and parietal pleura. After adequate anesthesia was accomplished. The plural catheter was advanced over a guide into the pleural space. The fluid was obtained without any difficulties and minimal blood loss. FINDINGS/SAMPLES: We removed 500  ml of june- colored/clear clear pleural fluid. The samples was sent for analysis. COMPLICATIONS:  None; patient tolerated the procedure well. PLAN: Care will be taken to review the post procedure radiograph for pneumothorax & testing when available.     Melinda Myers

## 2021-05-18 LAB — GRAM STAIN ORDERABLE: NORMAL

## 2021-05-19 LAB
BODY FLUID CULTURE, STERILE: NORMAL
GRAM STAIN RESULT: NORMAL

## 2021-05-25 ENCOUNTER — TELEPHONE (OUTPATIENT)
Dept: PULMONOLOGY | Age: 61
End: 2021-05-25

## 2021-05-25 NOTE — TELEPHONE ENCOUNTER
A message was left for the patient to call the office regarding his new bronchoscopy appointment.   Patient's procedure is on June 3rd at 1pm.

## 2021-05-26 NOTE — PROGRESS NOTES
Patient agreed to COVID test on 05/28/2021 at the  [address] [place] [address] between the hours of 6 am- 10 am located at  [address] [place] [address]. Patient instructed to bring ID. Patient instructed to self isolate until day of surgery.

## 2021-05-28 ENCOUNTER — HOSPITAL ENCOUNTER (OUTPATIENT)
Age: 61
Discharge: HOME | End: 2021-05-30
Payer: COMMERCIAL

## 2021-05-28 DIAGNOSIS — U07.1 COVID-19: ICD-10-CM

## 2021-05-28 PROCEDURE — U0003 INFECTIOUS AGENT DETECTION BY NUCLEIC ACID (DNA OR RNA); SEVERE ACUTE RESPIRATORY SYNDROME CORONAVIRUS 2 (SARS-COV-2) (CORONAVIRUS DISEASE [COVID-19]), AMPLIFIED PROBE TECHNIQUE, MAKING USE OF HIGH THROUGHPUT TECHNOLOGIES AS DESCRIBED BY CMS-2020-01-R: HCPCS

## 2021-05-28 RX ORDER — ACETAMINOPHEN 325 MG/1
650 TABLET ORAL EVERY 6 HOURS PRN
COMMUNITY
End: 2021-06-18

## 2021-05-28 NOTE — PROGRESS NOTES
[address] PRE-ADMISSION TESTING ENDOSCOPY INSTRUCTIONS- [facility]-phone number:[phone]    ENDOSCOPY INSTRUCTIONS:   [] Bowel prep instructions reviewed. [x] Nothing by mouth after midnight, including gum, candy, mints, or water. Please follow your surgeons instructions if you are required to complete a bowel prep. Colonoscopy- no solid food-only clear liquids the day prior). [x] You may brush your teeth, gargle, but do NOT swallow water. [x] Do not wear makeup, lotions, powders, deodorant. Nail polish as directed by the nurse. [x] Arrange transportation with a responsible adult  to and from the hospital. If you do not have a responsible adult  to transport you, you will need to make arrangements with a medical transportation company (i.e. [employer]. A Uber/taxi/bus is not appropriate unless you are accompanied by a responsible adult ). Arrange for someone to be with you for the remainder of the day and for 24 hours after your procedure due to having had anesthesia. Who will be your  for transportation? family   Who will be staying with you for 24 hrs after your procedure? family    PARKING INSTRUCTIONS:   [x] Arrival Time: 1100, wear mask   · [x] Parking lot  \"I\" OR 1 is located on [facility] (the corner of [facility]). To enter, press the button and the gate will lift. A free token will be provided to exit the lot. One car per patient is allowed to park in this lot. All other cars are to park on [address] [place] [address] either in the parking garage or the handicap lot. [] To reach the [place] lobby from [address] [place] [address], upon entering the hospital, take elevator B to the 3rd floor. EDUCATION INSTRUCTIONS:  [x] Bring a complete list of your medications, please write the last time you took the medicine, give this list to the nurse.   [x] Take the following medications the morning of surgery with 1-2 ounces of water: see list  [x] Stop herbal supplements and vitamins 5 days before your surgery. [] DO NOT take any diabetic medicine the morning of surgery. Follow instructions for insulin the day before surgery. [] If you are diabetic and your blood sugar is low or you feel symptomatic, you may drink 1-2 ounces of apple juice or take a glucose tablet. The morning of your procedure, you may call the pre-op area if you have concerns about your blood sugar [phone]. [] Use your inhalers the morning of surgery. Bring your emergency inhaler with you day of surgery. [] Follow physician instructions regarding any blood thinners you may be taking. WHAT TO EXPECT:  [x] The day of your procedure you will be greeted and checked in by the [employer].  In addition, you will be registered in the [facility] by a Patient Access Representative. Please bring your photo ID and insurance card. A nurse will greet you in accordance to the time you are needed in the pre-op area to prepare you for surgery. Please do not be discouraged if you are not greeted in the order you arrive as there are many variables that are involved in patient preparation. Your patience is greatly appreciated as you wait for your nurse. Please bring in items such as: books, magazines, newspapers, electronics, or any other items  to occupy your time in the waiting area. [x]  Delays may occur. Staff will make a sincere effort to keep you informed of delays. If any delays occur with your procedure, we apologize ahead of time for your inconvenience as we recognize the value of your time.

## 2021-05-31 LAB — SARS-COV-2, PCR: NOT DETECTED

## 2021-06-02 ENCOUNTER — ANESTHESIA EVENT (OUTPATIENT)
Dept: ENDOSCOPY | Age: 61
End: 2021-06-02
Payer: COMMERCIAL

## 2021-06-03 ENCOUNTER — ANESTHESIA (OUTPATIENT)
Dept: ENDOSCOPY | Age: 61
End: 2021-06-03
Payer: COMMERCIAL

## 2021-06-03 ENCOUNTER — HOSPITAL ENCOUNTER (OUTPATIENT)
Age: 61
Setting detail: OUTPATIENT SURGERY
Discharge: HOME | End: 2021-06-03
Attending: INTERNAL MEDICINE | Admitting: INTERNAL MEDICINE
Payer: COMMERCIAL

## 2021-06-03 ENCOUNTER — APPOINTMENT (OUTPATIENT)
Dept: GENERAL RADIOLOGY | Age: 61
End: 2021-06-03
Attending: INTERNAL MEDICINE
Payer: COMMERCIAL

## 2021-06-03 VITALS
HEART RATE: 68 BPM | RESPIRATION RATE: 18 BRPM | HEIGHT: 67 IN | TEMPERATURE: 98.3 F | BODY MASS INDEX: 18.05 KG/M2 | SYSTOLIC BLOOD PRESSURE: 103 MMHG | OXYGEN SATURATION: 99 % | DIASTOLIC BLOOD PRESSURE: 68 MMHG | WEIGHT: 115 LBS

## 2021-06-03 VITALS — SYSTOLIC BLOOD PRESSURE: 120 MMHG | OXYGEN SATURATION: 99 % | TEMPERATURE: 94.5 F | DIASTOLIC BLOOD PRESSURE: 81 MMHG

## 2021-06-03 DIAGNOSIS — Z01.818 PREOP TESTING: ICD-10-CM

## 2021-06-03 DIAGNOSIS — U07.1 COVID-19: Primary | ICD-10-CM

## 2021-06-03 LAB
ALBUMIN SERPL-MCNC: 4 G/DL (ref 3.5–5.2)
ALP BLD-CCNC: 75 U/L (ref 40–129)
ALT SERPL-CCNC: 11 U/L (ref 0–40)
ANION GAP SERPL CALCULATED.3IONS-SCNC: 7 MMOL/L (ref 7–16)
AST SERPL-CCNC: 20 U/L (ref 0–39)
BILIRUB SERPL-MCNC: 0.4 MG/DL (ref 0–1.2)
BUN BLDV-MCNC: 11 MG/DL (ref 6–20)
CALCIUM SERPL-MCNC: 9.4 MG/DL (ref 8.6–10.2)
CHLORIDE BLD-SCNC: 100 MMOL/L (ref 98–107)
CO2: 32 MMOL/L (ref 22–29)
CREAT SERPL-MCNC: 0.6 MG/DL (ref 0.7–1.2)
GFR AFRICAN AMERICAN: >60
GFR NON-AFRICAN AMERICAN: >60 ML/MIN/1.73
GLUCOSE BLD-MCNC: 103 MG/DL (ref 74–99)
HCT VFR BLD CALC: 40.9 % (ref 37–54)
HEMOGLOBIN: 13 G/DL (ref 12.5–16.5)
MCH RBC QN AUTO: 28.8 PG (ref 26–35)
MCHC RBC AUTO-ENTMCNC: 31.8 % (ref 32–34.5)
MCV RBC AUTO: 90.5 FL (ref 80–99.9)
PDW BLD-RTO: 13.4 FL (ref 11.5–15)
PLATELET # BLD: 276 E9/L (ref 130–450)
PMV BLD AUTO: 7.7 FL (ref 7–12)
POTASSIUM SERPL-SCNC: 3.7 MMOL/L (ref 3.5–5)
RBC # BLD: 4.52 E12/L (ref 3.8–5.8)
SODIUM BLD-SCNC: 139 MMOL/L (ref 132–146)
TOTAL PROTEIN: 8.2 G/DL (ref 6.4–8.3)
WBC # BLD: 7.7 E9/L (ref 4.5–11.5)

## 2021-06-03 PROCEDURE — 88341 IMHCHEM/IMCYTCHM EA ADD ANTB: CPT

## 2021-06-03 PROCEDURE — 87015 SPECIMEN INFECT AGNT CONCNTJ: CPT

## 2021-06-03 PROCEDURE — C1725 CATH, TRANSLUMIN NON-LASER: HCPCS | Performed by: INTERNAL MEDICINE

## 2021-06-03 PROCEDURE — 87205 SMEAR GRAM STAIN: CPT

## 2021-06-03 PROCEDURE — 7100000001 HC PACU RECOVERY - ADDTL 15 MIN: Performed by: INTERNAL MEDICINE

## 2021-06-03 PROCEDURE — 36415 COLL VENOUS BLD VENIPUNCTURE: CPT

## 2021-06-03 PROCEDURE — 3700000001 HC ADD 15 MINUTES (ANESTHESIA): Performed by: INTERNAL MEDICINE

## 2021-06-03 PROCEDURE — 7100000011 HC PHASE II RECOVERY - ADDTL 15 MIN: Performed by: INTERNAL MEDICINE

## 2021-06-03 PROCEDURE — 31652 BRONCH EBUS SAMPLNG 1/2 NODE: CPT | Performed by: INTERNAL MEDICINE

## 2021-06-03 PROCEDURE — 3609027000 HC BRONCHOSCOPY: Performed by: INTERNAL MEDICINE

## 2021-06-03 PROCEDURE — 87070 CULTURE OTHR SPECIMN AEROBIC: CPT

## 2021-06-03 PROCEDURE — 2580000003 HC RX 258: Performed by: INTERNAL MEDICINE

## 2021-06-03 PROCEDURE — 88305 TISSUE EXAM BY PATHOLOGIST: CPT

## 2021-06-03 PROCEDURE — 85027 COMPLETE CBC AUTOMATED: CPT

## 2021-06-03 PROCEDURE — 7100000000 HC PACU RECOVERY - FIRST 15 MIN: Performed by: INTERNAL MEDICINE

## 2021-06-03 PROCEDURE — 88342 IMHCHEM/IMCYTCHM 1ST ANTB: CPT

## 2021-06-03 PROCEDURE — 87116 MYCOBACTERIA CULTURE: CPT

## 2021-06-03 PROCEDURE — 88173 CYTOPATH EVAL FNA REPORT: CPT

## 2021-06-03 PROCEDURE — 80053 COMPREHEN METABOLIC PANEL: CPT

## 2021-06-03 PROCEDURE — 87102 FUNGUS ISOLATION CULTURE: CPT

## 2021-06-03 PROCEDURE — 3609020000 HC BRONCHOSCOPY W/EBUS FNA: Performed by: INTERNAL MEDICINE

## 2021-06-03 PROCEDURE — 71045 X-RAY EXAM CHEST 1 VIEW: CPT

## 2021-06-03 PROCEDURE — 87206 SMEAR FLUORESCENT/ACID STAI: CPT

## 2021-06-03 PROCEDURE — 2709999900 HC NON-CHARGEABLE SUPPLY: Performed by: INTERNAL MEDICINE

## 2021-06-03 PROCEDURE — 3700000000 HC ANESTHESIA ATTENDED CARE: Performed by: INTERNAL MEDICINE

## 2021-06-03 PROCEDURE — 88112 CYTOPATH CELL ENHANCE TECH: CPT

## 2021-06-03 PROCEDURE — 2500000003 HC RX 250 WO HCPCS

## 2021-06-03 PROCEDURE — 6360000002 HC RX W HCPCS

## 2021-06-03 PROCEDURE — 7100000010 HC PHASE II RECOVERY - FIRST 15 MIN: Performed by: INTERNAL MEDICINE

## 2021-06-03 RX ORDER — DEXAMETHASONE SODIUM PHOSPHATE 10 MG/ML
INJECTION, SOLUTION INTRAMUSCULAR; INTRAVENOUS PRN
Status: DISCONTINUED | OUTPATIENT
Start: 2021-06-03 | End: 2021-06-03 | Stop reason: SDUPTHER

## 2021-06-03 RX ORDER — OXYCODONE HYDROCHLORIDE AND ACETAMINOPHEN 5; 325 MG/1; MG/1
1 TABLET ORAL
Status: DISCONTINUED | OUTPATIENT
Start: 2021-06-03 | End: 2021-06-03 | Stop reason: HOSPADM

## 2021-06-03 RX ORDER — ROCURONIUM BROMIDE 10 MG/ML
INJECTION, SOLUTION INTRAVENOUS PRN
Status: DISCONTINUED | OUTPATIENT
Start: 2021-06-03 | End: 2021-06-03 | Stop reason: SDUPTHER

## 2021-06-03 RX ORDER — LIDOCAINE HYDROCHLORIDE 20 MG/ML
INJECTION, SOLUTION INTRAVENOUS PRN
Status: DISCONTINUED | OUTPATIENT
Start: 2021-06-03 | End: 2021-06-03 | Stop reason: SDUPTHER

## 2021-06-03 RX ORDER — NEOSTIGMINE METHYLSULFATE 1 MG/ML
INJECTION, SOLUTION INTRAVENOUS PRN
Status: DISCONTINUED | OUTPATIENT
Start: 2021-06-03 | End: 2021-06-03 | Stop reason: SDUPTHER

## 2021-06-03 RX ORDER — PROPOFOL 10 MG/ML
INJECTION, EMULSION INTRAVENOUS PRN
Status: DISCONTINUED | OUTPATIENT
Start: 2021-06-03 | End: 2021-06-03 | Stop reason: SDUPTHER

## 2021-06-03 RX ORDER — HYDRALAZINE HYDROCHLORIDE 20 MG/ML
5 INJECTION INTRAMUSCULAR; INTRAVENOUS
Status: DISCONTINUED | OUTPATIENT
Start: 2021-06-03 | End: 2021-06-03 | Stop reason: HOSPADM

## 2021-06-03 RX ORDER — MIDAZOLAM HYDROCHLORIDE 1 MG/ML
INJECTION INTRAMUSCULAR; INTRAVENOUS PRN
Status: DISCONTINUED | OUTPATIENT
Start: 2021-06-03 | End: 2021-06-03 | Stop reason: SDUPTHER

## 2021-06-03 RX ORDER — SODIUM CHLORIDE 9 MG/ML
INJECTION, SOLUTION INTRAVENOUS CONTINUOUS
Status: DISCONTINUED | OUTPATIENT
Start: 2021-06-03 | End: 2021-06-03 | Stop reason: HOSPADM

## 2021-06-03 RX ORDER — FENTANYL CITRATE 50 UG/ML
25 INJECTION, SOLUTION INTRAMUSCULAR; INTRAVENOUS EVERY 5 MIN PRN
Status: DISCONTINUED | OUTPATIENT
Start: 2021-06-03 | End: 2021-06-03 | Stop reason: HOSPADM

## 2021-06-03 RX ORDER — FENTANYL CITRATE 50 UG/ML
50 INJECTION, SOLUTION INTRAMUSCULAR; INTRAVENOUS EVERY 5 MIN PRN
Status: DISCONTINUED | OUTPATIENT
Start: 2021-06-03 | End: 2021-06-03 | Stop reason: HOSPADM

## 2021-06-03 RX ORDER — ONDANSETRON 2 MG/ML
4 INJECTION INTRAMUSCULAR; INTRAVENOUS
Status: DISCONTINUED | OUTPATIENT
Start: 2021-06-03 | End: 2021-06-03 | Stop reason: HOSPADM

## 2021-06-03 RX ORDER — LABETALOL HYDROCHLORIDE 5 MG/ML
2.5 INJECTION, SOLUTION INTRAVENOUS EVERY 10 MIN PRN
Status: DISCONTINUED | OUTPATIENT
Start: 2021-06-03 | End: 2021-06-03 | Stop reason: HOSPADM

## 2021-06-03 RX ORDER — GLYCOPYRROLATE 1 MG/5 ML
SYRINGE (ML) INTRAVENOUS PRN
Status: DISCONTINUED | OUTPATIENT
Start: 2021-06-03 | End: 2021-06-03 | Stop reason: SDUPTHER

## 2021-06-03 RX ORDER — FENTANYL CITRATE 50 UG/ML
INJECTION, SOLUTION INTRAMUSCULAR; INTRAVENOUS PRN
Status: DISCONTINUED | OUTPATIENT
Start: 2021-06-03 | End: 2021-06-03 | Stop reason: SDUPTHER

## 2021-06-03 RX ORDER — ONDANSETRON 2 MG/ML
INJECTION INTRAMUSCULAR; INTRAVENOUS PRN
Status: DISCONTINUED | OUTPATIENT
Start: 2021-06-03 | End: 2021-06-03 | Stop reason: SDUPTHER

## 2021-06-03 RX ADMIN — ONDANSETRON HYDROCHLORIDE 4 MG: 2 INJECTION, SOLUTION INTRAMUSCULAR; INTRAVENOUS at 13:55

## 2021-06-03 RX ADMIN — ROCURONIUM BROMIDE 30 MG: 10 INJECTION, SOLUTION INTRAVENOUS at 13:17

## 2021-06-03 RX ADMIN — FENTANYL CITRATE 100 MCG: 50 INJECTION, SOLUTION INTRAMUSCULAR; INTRAVENOUS at 13:17

## 2021-06-03 RX ADMIN — Medication 0.6 MG: at 14:06

## 2021-06-03 RX ADMIN — MIDAZOLAM 2 MG: 1 INJECTION INTRAMUSCULAR; INTRAVENOUS at 13:14

## 2021-06-03 RX ADMIN — FENTANYL CITRATE 50 MCG: 50 INJECTION, SOLUTION INTRAMUSCULAR; INTRAVENOUS at 13:40

## 2021-06-03 RX ADMIN — DEXAMETHASONE SODIUM PHOSPHATE 10 MG: 10 INJECTION INTRAMUSCULAR; INTRAVENOUS at 13:25

## 2021-06-03 RX ADMIN — Medication 3 MG: at 14:06

## 2021-06-03 RX ADMIN — PROPOFOL 150 MG: 10 INJECTION, EMULSION INTRAVENOUS at 13:17

## 2021-06-03 RX ADMIN — SODIUM CHLORIDE: 9 INJECTION, SOLUTION INTRAVENOUS at 11:55

## 2021-06-03 RX ADMIN — LIDOCAINE HYDROCHLORIDE 60 MG: 20 INJECTION, SOLUTION INTRAVENOUS at 13:17

## 2021-06-03 ASSESSMENT — PULMONARY FUNCTION TESTS
PIF_VALUE: 2
PIF_VALUE: 21
PIF_VALUE: 39
PIF_VALUE: 24
PIF_VALUE: 0
PIF_VALUE: 41
PIF_VALUE: 41
PIF_VALUE: 1
PIF_VALUE: 41
PIF_VALUE: 1
PIF_VALUE: 24
PIF_VALUE: 1
PIF_VALUE: 0
PIF_VALUE: 14
PIF_VALUE: 1
PIF_VALUE: 40
PIF_VALUE: 14
PIF_VALUE: 41
PIF_VALUE: 41
PIF_VALUE: 0
PIF_VALUE: 15
PIF_VALUE: 31
PIF_VALUE: 23
PIF_VALUE: 0
PIF_VALUE: 27
PIF_VALUE: 0
PIF_VALUE: 12
PIF_VALUE: 1
PIF_VALUE: 21
PIF_VALUE: 40
PIF_VALUE: 40
PIF_VALUE: 2
PIF_VALUE: 40
PIF_VALUE: 22
PIF_VALUE: 2
PIF_VALUE: 33
PIF_VALUE: 40
PIF_VALUE: 36
PIF_VALUE: 40
PIF_VALUE: 13
PIF_VALUE: 40
PIF_VALUE: 20
PIF_VALUE: 5
PIF_VALUE: 16
PIF_VALUE: 1
PIF_VALUE: 2
PIF_VALUE: 19
PIF_VALUE: 17
PIF_VALUE: 0
PIF_VALUE: 1
PIF_VALUE: 20
PIF_VALUE: 40
PIF_VALUE: 40
PIF_VALUE: 0
PIF_VALUE: 40
PIF_VALUE: 19
PIF_VALUE: 1
PIF_VALUE: 21
PIF_VALUE: 21
PIF_VALUE: 18
PIF_VALUE: 36
PIF_VALUE: 0
PIF_VALUE: 23
PIF_VALUE: 23
PIF_VALUE: 40
PIF_VALUE: 0
PIF_VALUE: 0
PIF_VALUE: 13
PIF_VALUE: 13

## 2021-06-03 ASSESSMENT — PAIN SCALES - GENERAL
PAINLEVEL_OUTOF10: 0

## 2021-06-03 ASSESSMENT — PAIN - FUNCTIONAL ASSESSMENT: PAIN_FUNCTIONAL_ASSESSMENT: 0-10

## 2021-06-03 ASSESSMENT — LIFESTYLE VARIABLES: SMOKING_STATUS: 1

## 2021-06-03 NOTE — ANESTHESIA PRE PROCEDURE
Department of Anesthesiology  Preprocedure Note       Name:  Arnoldo Steiner   Age:  62 y.o.  :  1960                                          MRN:  [id number]         Date:  6/3/2021      Surgeon: Jonathan Joshi):  Akiko Lang MD    Procedure: Procedure(s):  BRONCHOSCOPY  EBUS    Medications prior to admission:   Prior to Admission medications    Medication Sig Start Date End Date Taking? Authorizing Provider   albuterol sulfate  (90 Base) MCG/ACT inhaler Inhale 2 puffs into the lungs 4 times daily as needed for Wheezing 21  Yes WANDY Treadwell III   acetaminophen (TYLENOL) 325 MG tablet Take 650 mg by mouth every 6 hours as needed for Pain    Historical Provider, MD   Loratadine (CLARITIN PO) Take by mouth daily as needed     Historical Provider, MD       Current medications:    Current Facility-Administered Medications   Medication Dose Route Frequency Provider Last Rate Last Admin    0.9 % sodium chloride infusion   Intravenous Continuous Teraeer Reed Moritz,  mL/hr at 21 1155 New Bag at 21 1155       Allergies:  No Known Allergies    Problem List:    Patient Active Problem List   Diagnosis Code    Acute non-recurrent maxillary sinusitis J01.00       Past Medical History:        Diagnosis Date    Alcohol abuse     Lung mass     right    Seasonal allergies     Tobacco abuse        Past Surgical History:        Procedure Laterality Date    HERNIA REPAIR      THORACENTESIS Right 2021    RIGHT THORACENTESIS ULTRASOUND performed by Akiko Lang MD at [facility]         Social History:    Social History     Tobacco Use    Smoking status: Current Every Day Smoker     Packs/day: 0.50    Smokeless tobacco: Never Used    Tobacco comment: was 1 pack daily   Substance Use Topics    Alcohol use:  Yes     Alcohol/week: 25.0 standard drinks     Types: 25 Cans of beer per week     Comment: daily 5 beers now was more                                Ready to quit: Not Answered  Counseling given: Not Answered  Comment: was 1 pack daily      Vital Signs (Current):   Vitals:    05/28/21 1449 06/03/21 1140   BP:  104/68   Pulse:  83   Resp:  18   Temp:  98 °F (36.7 °C)   TempSrc:  Temporal   SpO2:  98%   Weight: 115 lb (52.2 kg) 115 lb (52.2 kg)   Height:  5' 7\" (1.702 m)                                              BP Readings from Last 3 Encounters:   06/03/21 104/68   05/17/21 105/71   05/07/21 138/67       NPO Status: Time of last liquid consumption: 2300                        Time of last solid consumption: 2300                        Date of last liquid consumption: 06/02/21                        Date of last solid food consumption: 06/02/21    BMI:   Wt Readings from Last 3 Encounters:   06/03/21 115 lb (52.2 kg)   05/17/21 132 lb (59.9 kg)   05/07/21 132 lb (59.9 kg)     Body mass index is 18.01 kg/m². CBC:   Lab Results   Component Value Date    WBC 7.7 06/03/2021    RBC 4.52 06/03/2021    HGB 13.0 06/03/2021    HCT 40.9 06/03/2021    MCV 90.5 06/03/2021    RDW 13.4 06/03/2021     06/03/2021       CMP:   Lab Results   Component Value Date     06/03/2021    K 3.7 06/03/2021    K 4.6 05/07/2021     06/03/2021    CO2 32 06/03/2021    BUN 11 06/03/2021    CREATININE 0.6 06/03/2021    GFRAA >60 06/03/2021    LABGLOM >60 06/03/2021    GLUCOSE 103 06/03/2021    PROT 8.2 06/03/2021    CALCIUM 9.4 06/03/2021    BILITOT 0.4 06/03/2021    ALKPHOS 75 06/03/2021    AST 20 06/03/2021    ALT 11 06/03/2021       POC Tests: No results for input(s): POCGLU, POCNA, POCK, POCCL, POCBUN, POCHEMO, POCHCT in the last 72 hours.     Coags:   Lab Results   Component Value Date    PROTIME 11.7 05/07/2021    INR 1.1 05/07/2021       HCG (If Applicable): No results found for: PREGTESTUR, PREGSERUM, HCG, HCGQUANT     ABGs: No results found for: PHART, PO2ART, BBT9PCU, PHC8SCV, BEART, O6NSPVEN     Type & Screen (If Applicable):  No results found for: LABABO, [address]    Drug/Infectious Status (If Applicable):  No results found for: HIV, HEPCAB    COVID-19 Screening (If Applicable):   Lab Results   Component Value Date    COVID19 Not Detected 05/28/2021           Anesthesia Evaluation  Patient summary reviewed and Nursing notes reviewed no history of anesthetic complications:   Airway: Mallampati: II        Dental:      Comment: Partial upper. Missing some lower teeth. Pulmonary: breath sounds clear to auscultation  (+) COPD:  current smoker          Patient smoked on day of surgery. ROS comment: ASSESSMENT:  1.) Right hilar mass  2.)Subcarinal mass/lymph node  3.)Pleural effusion   4.)severe emphysema   5. )COPD    RLL hilar mass  Rt. Pleural effusion   Cardiovascular:Negative CV ROS            Rhythm: regular  Rate: normal                    Neuro/Psych:                ROS comment: ETOH abuse GI/Hepatic/Renal: Neg GI/Hepatic/Renal ROS            Endo/Other: Negative Endo/Other ROS                    Abdominal:           Vascular: negative vascular ROS. Anesthesia Plan      general     ASA 3             Anesthetic plan and risks discussed with patient. Plan discussed with CRNA. Sania Ferrari DO   6/3/2021    Patient seen and examined, chart reviewed, agree with above findings. Anesthetic plan, risks, benefits, alternatives, and personnel involved discussed with patient. Patient verbalized an understanding and agreed to proceed. NPO status confirmed. Anesthetic plan discussed with care team members and agreed upon.     Sania Ferrari DO   6/3/2021  12:44 PM

## 2021-06-03 NOTE — ANESTHESIA POSTPROCEDURE EVALUATION
Department of Anesthesiology  Postprocedure Note    Patient: José Miguel Jackson  MRN: [id number]  YOB: 1960  Date of evaluation: 6/3/2021  Time:  5:31 PM     Procedure Summary     Date: 06/03/21 Room / Location: [place]    Anesthesia Start: 1300 Anesthesia Stop: 6651    Procedure: BRONCHOSCOPY W/EBUS FNA (N/A ) Diagnosis: (LUNG NODULE)    Surgeons: Joellen Taylor MD Responsible Provider: Madhuri Shell DO    Anesthesia Type: general ASA Status: 3          Anesthesia Type: general    Jeremy Phase I: Jeremy Score: 10    Jeremy Phase II: Jeremy Score: 10    Last vitals: Reviewed and per EMR flowsheets.        Anesthesia Post Evaluation    Patient location during evaluation: PACU  Patient participation: complete - patient participated  Level of consciousness: awake and alert  Pain score: 1  Airway patency: patent  Nausea & Vomiting: no nausea and no vomiting  Complications: no  Cardiovascular status: hemodynamically stable  Respiratory status: acceptable  Hydration status: euvolemic

## 2021-06-03 NOTE — H&P
Pulmonary [address] [facility]                             Pulmonary Consult/Progress Note :          Patient: Cece Navarro  MRN: [id number]  : 1960              Reason for Consultation:Lung mass  CC : SOB   HPI:   Cece Navarro is a 62y.o. year old who smoke for 40 years and he smoke 1 pack daily ,40 PPY and now down to 1/2 pack daily   He was in ER last with SOB and he had CT chest and shows lung mass with effusion     He had small streak of bloods with sputum and some times small spots of bright blood     Patient also was treated with antibiotics in urgent cares and course of steroids as he state and he felt much better    He denies any further hemoptysis he denies any chest pain his shortness of breath is at baseline    He is using albuterol as needed    PAST MEDICAL HISTORY:     Past Medical History:   Diagnosis Date    Alcohol abuse     Lung mass     right    Seasonal allergies     Tobacco abuse        PAST SURGICAL HISTORY:   Past Surgical History:   Procedure Laterality Date    HERNIA REPAIR      THORACENTESIS Right 2021    RIGHT THORACENTESIS ULTRASOUND performed by Hilda Ruiz MD at [address] [facility]:   History reviewed. No pertinent family history. SOCIAL HISTORY:   Social History     Socioeconomic History    Marital status:      Spouse name: Not on file    Number of children: Not on file    Years of education: Not on file    Highest education level: Not on file   Occupational History    Not on file   Tobacco Use    Smoking status: Current Every Day Smoker     Packs/day: 0.50    Smokeless tobacco: Never Used    Tobacco comment: was 1 pack daily   Substance and Sexual Activity    Alcohol use:  Yes     Alcohol/week: 25.0 standard drinks     Types: 25 Cans of beer per week     Comment: daily 5 beers now was more    Drug use: Yes     Types: Marijuana     Comment: daily    Sexual activity: Not on file   Other Topics Concern    Not on file   Social History Narrative    Not on file     Social Determinants of Health     Financial Resource Strain:     Difficulty of Paying Living Expenses:    Food Insecurity:     Worried About Running Out of Food in the Last Year:     [address] in the Last Year:    Transportation Needs:     Lack of Transportation (Medical):  Lack of Transportation (Non-Medical):    Physical Activity:     Days of Exercise per Week:     Minutes of Exercise per Session:    Stress:     Feeling of Stress :    Social Connections:     Frequency of Communication with Friends and Family:     Frequency of Social Gatherings with Friends and Family:     Attends Nondenominational Services:     Active Member of Clubs or Organizations:     Attends Club or Organization Meetings:     Marital Status:    Intimate Partner Violence:     Fear of Current or Ex-Partner:     Emotionally Abused:     Physically Abused:     Sexually Abused:      Social History     Tobacco Use   Smoking Status Current Every Day Smoker    Packs/day: 0.50   Smokeless Tobacco Never Used   Tobacco Comment    was 1 pack daily     Social History     Substance and Sexual Activity   Alcohol Use Yes    Alcohol/week: 25.0 standard drinks    Types: 25 Cans of beer per week    Comment: daily 5 beers now was more     Social History     Substance and Sexual Activity   Drug Use Yes    Types: Marijuana    Comment: daily           HOME MEDICATIONS:  Prior to Admission medications    Medication Sig Start Date End Date Taking?  Authorizing Provider   albuterol sulfate  (90 Base) MCG/ACT inhaler Inhale 2 puffs into the lungs 4 times daily as needed for Wheezing 4/20/21  Yes Gerhardt Booker III, PA   acetaminophen (TYLENOL) 325 MG tablet Take 650 mg by mouth every 6 hours as needed for Pain    Historical Provider, MD   Loratadine (CLARITIN PO) Take by mouth daily as needed     Historical Provider, MD       CURRENT MEDICATIONS:  Current Facility-Administered Medications: 0.9 % sodium chloride infusion, , Intravenous, Continuous    IV MEDICATIONS:      ALLERGIES:  No Known Allergies    REVIEW OF SYSTEMS:  General ROS:  No weight loss ,no fatigue     ENT ROS:   No Sore throat ,no lymphoadenopathy,no nasal stuffiness     Hematological and Lymphatic ROS:   No ecchymosis ,no tendency to bleed  Respiratory ROS:   Shortness of breath  Cardiovascular ROS:   No CP,No Palpitation   Gastrointestinal ROS:   No Gi bleed,no nausea or vomiting      - Musculoskeletal ROS:      - no joint swelling ,no joint pain   Neurological ROS:     -no weakness or numbness    Dermatological ROS:   No skin rash ,no urticaria     PHYSICAL EXAMINATION:     VITAL SIGNS:  /68   Pulse 83   Temp 98 °F (36.7 °C) (Temporal)   Resp 18   Ht 5' 7\" (1.702 m)   Wt 115 lb (52.2 kg)   SpO2 98%   BMI 18.01 kg/m²   Wt Readings from Last 3 Encounters:   06/03/21 115 lb (52.2 kg)   05/17/21 132 lb (59.9 kg)   05/07/21 132 lb (59.9 kg)     Temp Readings from Last 3 Encounters:   06/03/21 98 °F (36.7 °C) (Temporal)   05/17/21 97.6 °F (36.4 °C) (Temporal)   05/07/21 97.9 °F (36.6 °C)     TMAX:  BP Readings from Last 3 Encounters:   06/03/21 104/68   05/17/21 105/71   05/07/21 138/67     Pulse Readings from Last 3 Encounters:   06/03/21 83   05/17/21 80   05/07/21 70           INTAKE/OUTPUTS:  No intake/output data recorded.   No intake or output data in the 24 hours ending 06/03/21 1221    General Appearance: alert and oriented to person, place and time, well-developed and   well-nourished, in no acute distress   Eyes: pupils equal, round, and reactive to light, extraocular eye movements intact, conjunctivae normal and sclera anicteric   Neck: neck supple and non tender without mass, no thyromegaly, no thyroid nodules and no cervical adenopathy   Pulmonary/Chest: Rhonchi bilaterally  Cardiovascular: normal rate, regular rhythm, normal S1 and S2, no murmurs, rubs, clicks or gallops, distal pulses intact, no carotid bruits, no murmurs, no gallops, no carotid bruits and no JVD   Abdomen: obese, soft, non-tender, non-distended, normal bowel sounds, no masses or organomegaly   Extremities: No edema or cyanosis  Musculoskeletal: normal range of motion, no joint swelling, deformity or tenderness   Neurologic: reflexes normal and symmetric, no cranial nerve deficit noted    LABS/IMAGING:    CBC:  Lab Results   Component Value Date    WBC 7.7 06/03/2021    HGB 13.0 06/03/2021    HCT 40.9 06/03/2021    MCV 90.5 06/03/2021     06/03/2021    LYMPHOPCT 28.2 05/07/2021    RBC 4.52 06/03/2021    MCH 28.8 06/03/2021    MCHC 31.8 (L) 06/03/2021    RDW 13.4 06/03/2021    NEUTOPHILPCT 56.4 05/07/2021    MONOPCT 11.7 05/07/2021    BASOPCT 0.7 05/07/2021    NEUTROABS 3.32 05/07/2021    LYMPHSABS 1.66 05/07/2021    MONOSABS 0.69 05/07/2021    EOSABS 0.15 05/07/2021    BASOSABS 0.04 05/07/2021       Recent Labs     06/03/21  1152 05/07/21  0927   WBC 7.7 5.9   HGB 13.0 13.9   HCT 40.9 41.2   MCV 90.5 87.8    272       BMP:   No results for input(s): NA, K, CL, CO2, PHOS, BUN, CREATININE in the last 72 hours. Invalid input(s): CA    MG: No results found for: MG  Ca/Phos:   Lab Results   Component Value Date    CALCIUM 9.3 05/07/2021     Amylase: No results found for: AMYLASE  Lipase:   Lab Results   Component Value Date    LIPASE 12 (L) 05/07/2021     LIVER PROFILE: No results for input(s): AST, ALT, LIPASE, BILIDIR, BILITOT, ALKPHOS in the last 72 hours. Invalid input(s): AMYLASE,  ALB    PT/INR: No results for input(s): PROTIME, INR in the last 72 hours. APTT: No results for input(s): APTT in the last 72 hours.     Cardiac Enzymes:  Lab Results   Component Value Date    TROPONINI <0.01 05/07/2021                   PROBLEM LIST:  Patient Active Problem List   Diagnosis    Acute non-recurrent maxillary sinusitis               ASSESSMENT:  1.) Right hilar mass  2.)Subcarinal mass/lymph node  3.)Pleural effusion   4.)severe emphysema   5. )COPD      PLAN:  *-Patient with significant history of smoking, severe emphysema, right hilar mass I have no doubt that it is malignant, also some adenopathy subcarinal and R4, with pleural effusion we will start with diagnostic thoracentesis as that give us higher stage and diagnosis if the fluid is negative then I will proceed with bronchoscopy and also will proceed with EBUS most likely to assess for endobronchial lesion and for the cause of his hemoptysis so we will start with diagnostic thoracentesis on Monday and then hopefully after that we will do bronc and EBUS if needed    *-Continue his albuterol  *-We will start trilogy down the road and we will get PFT  *-I spent 4-6 minutes counseling patient regarding smoking and the risk of Lung cancer and COPD and respiratory failure           Thank you very much for allowing me to participate in the care of this pleasant patient , should you have any questions ,please do not hesitate to contact me      Franco Morris MD,[facility]  Pulmonary&Critical Care Medicine   Director of [address] [place] [address] Director of [address] [place] [address]    Hortensia Gallegos    NOTE: This report was transcribed using voice recognition software. Every effort was made to ensure accuracy; however, inadvertent computerized transcription errors may be present.

## 2021-06-03 NOTE — OP NOTE
Operative Note        [address] with EBUS  PROCEDURE NOTE    DATE OF PROCEDURE:6/ 3 / 2021    INDICATIONS & HISTORY:  Lung mass    PREOPERATIVE DIAGNOSIS:    Lung cancer     POSTOPERATIVE DIAGNOSES:  None       PROCEDURE PERFORMED:   1-Diagnotic, Fiberoptic Bronchoscopy  2-EBUS -FNA     3- Bronchial washing RLL send for culture                SURGEON:    Franco Morris     ASSISTANT:   Bronchoscopy Nursing/Technical Team/OR Team    SEDATION:  propofol   Regional Anesthesia,       ANESTHESIA:    Lidocaine 2% ,       ANESTHESIOLOGIST:  Per EPIC Note    SPECIMENS:      Description of Procedure: The patient was taken to the endoscopy suite, Marni Prather  and the procedure verified as Flexible Fiberoptic Bronchoscopy with EBUS -FNA        After the patient was controlled with sedation bronchoscope was introduced through ET without difficulty. The scope was then passed into the trachea. Additional 2% lidocaine was used topically within the airway. Careful inspection of the tracheal lumen was accomplished. The scope was sequentially passed into all bronchial airways. Endobronchial findings:     Trachea:  Normal mucosa, he the part seen outside the ET tube  Carlene  Normal mucosa     Right Main Stem Bronchus . very edematous ,dragile mucus ,  Right Upper Lobe Bronchi very edematous ,dragile mucus ,  Right Middle Lobe Bronchi  very edematous ,dragile mucus ,  Right Lower Lobe Bronchi (including the Superior segment)  very edematous ,dragile mucus ,     Left Main Stem Bronchus very edematous ,dragile mucus ,  Left Upper Lobe Bronchus, Upper Division very edematous ,dragile mucus ,Normal mucosa  Left Upper Lobe Bronchus, Lingula  very edematous ,dragile mucus ,  Left Lower Lobe Bronchus (including the Superior segment)   very edematous ,dragile mucus ,       EBUS scp[e was inserted  We saw R4 2 X 2 cm 4passess taken  Then station 7 seen as he start bleed from R4 and I took more 3 passes  Station 7 was 3X 3 cm       COMPLICATIONS:  Estimated blood loss less than 2 CC    IMPRESSIONS:   Lung cancer     RECOMMENDATIONS:   The Patient was taken to the recovery area in satisfactory condition. Await final test/sample results.       Patient: Arnoldo Steiner  YOB: 1960  MRN: [id number]    Date of Procedure: 6/3/2021    Procedure(s):  BRONCHOSCOPY W/EBUS FNA    Surgeon(s):  Jaron Peoples MD    Assistant:   * No surgical staff found *    Anesthesia: General    Estimated Blood Loss (mL): Minimal    Complications: None    Specimens:   ID Type Source Tests Collected by Time Destination   1 : RLL Respiratory Bronchial Washing CULTURE, FUNGUS, GRAM STAIN, CULTURE WITH SMEAR, ACID FAST BACILLIUS, CULTURE, RESPIRATORY Jaron Peoples MD 6/3/2021 1328    A : RLL Respiratory Bronchial Washing CYTOLOGY, NON-GYN Jaron Peoples MD 6/3/2021 1330    B : R4 Tissue Fine Needle Aspirate CYTOLOGY, OBED Peoples MD 6/3/2021 1334    C : Station 7 Tissue Fine Needle Aspirate CYTOLOGY, NON-GYN Jaron Peoples MD 6/3/2021 1338        Electronically signed by Clifford Paige MD on 6/3/2021 at 1:57 PM

## 2021-06-03 NOTE — PROGRESS NOTES
Patient tolerating PO and fluids. Luis Miguel contacted. Reviewed discharge instructions with patient. No questions at this time.

## 2021-06-04 LAB — GRAM STAIN ORDERABLE: NORMAL

## 2021-06-05 LAB
CULTURE, RESPIRATORY: NORMAL
SMEAR, RESPIRATORY: NORMAL

## 2021-06-10 ENCOUNTER — TELEPHONE (OUTPATIENT)
Dept: PULMONOLOGY | Age: 61
End: 2021-06-10

## 2021-06-10 DIAGNOSIS — C80.1 SMALL CELL CARCINOMA (HCC): Primary | ICD-10-CM

## 2021-06-10 DIAGNOSIS — R91.8 LUNG MASS: Primary | ICD-10-CM

## 2021-06-10 NOTE — TELEPHONE ENCOUNTER
Patient was given the results of his bronchial biopsy. Patient was told that Dr. Garry Salcedo is referring him to Dr. Iqra Conley for treatment.   Patient's questions were answered to his satisfaction and was agreeable with the referral.

## 2021-06-11 ENCOUNTER — HOSPITAL ENCOUNTER (OUTPATIENT)
Dept: CT IMAGING | Age: 61
Discharge: HOME | End: 2021-06-13
Payer: COMMERCIAL

## 2021-06-11 ENCOUNTER — HOSPITAL ENCOUNTER (OUTPATIENT)
Dept: MRI IMAGING | Age: 61
Discharge: HOME | End: 2021-06-13
Payer: COMMERCIAL

## 2021-06-11 ENCOUNTER — HOSPITAL ENCOUNTER (OUTPATIENT)
Dept: GENERAL RADIOLOGY | Age: 61
Discharge: HOME | End: 2021-06-13
Payer: COMMERCIAL

## 2021-06-11 DIAGNOSIS — C34.90 SMALL CELL LUNG CANCER (HCC): ICD-10-CM

## 2021-06-11 DIAGNOSIS — C34.90 SMALL CELL LUNG CANCER (HCC): Primary | ICD-10-CM

## 2021-06-11 DIAGNOSIS — Z87.821 HISTORY OF FOREIGN BODY IN EYE: ICD-10-CM

## 2021-06-11 PROCEDURE — 70553 MRI BRAIN STEM W/O & W/DYE: CPT

## 2021-06-11 PROCEDURE — 70030 X-RAY EYE FOR FOREIGN BODY: CPT

## 2021-06-11 PROCEDURE — A9577 INJ MULTIHANCE: HCPCS | Performed by: RADIOLOGY

## 2021-06-11 PROCEDURE — 6360000004 HC RX CONTRAST MEDICATION: Performed by: RADIOLOGY

## 2021-06-11 PROCEDURE — 74178 CT ABD&PLV WO CNTR FLWD CNTR: CPT

## 2021-06-11 RX ADMIN — GADOBENATE DIMEGLUMINE 10 ML: 529 INJECTION, SOLUTION INTRAVENOUS at 16:41

## 2021-06-11 RX ADMIN — IOHEXOL 50 ML: 240 INJECTION, SOLUTION INTRATHECAL; INTRAVASCULAR; INTRAVENOUS; ORAL at 13:25

## 2021-06-11 RX ADMIN — IOPAMIDOL 75 ML: 755 INJECTION, SOLUTION INTRAVENOUS at 13:25

## 2021-06-13 NOTE — PROGRESS NOTES
Department of [facility]  Attending Consult Note    Reason for Visit: Consultation on a patient with Small Cell Carcinoma     Referring Physician: Lenor Simmonds, MD     PCP:  Rico Ferreira DO    History of Present Illness:  62year old male with 36 PPY smoking history who presented to the ER 05/07/2021 for SOB  hemoptysis and associated dysphagia    CTA pulmonary 05/07/2021 with multiple enlarged prevascular, paratracheal and subcarinal lymph nodes measuring up to 3 cm. Right lower lobe/right hilar mass measuring 5cm encasing the right mainstem bronchus with stenosis. U/S guided Thoracentesis on 05/17/2021 (500 ml of fluid removed)  Right Pleural fluid Negative for malignant cells     Bronchoscopy/EBUS 06/03/2021  Lung mass, R4  POSITIVE FOR MALIGNANT CELLS   Small cell carcinoma   Cellblock is non-contributory. Bronchial wash, RLL  POSITIVE FOR MALIGNANT CELLS   Small cell carcinoma     Station 7  POSITIVE FOR MALIGNANT CELLS   Small cell carcinoma   Cellblock is similar. COMMENT:   [facility] results are as follows: Pankeratin (+), TTF-1 (+), P40 (-), Chromogranin (-), Synaptophysin (+), LCA (-)     CT abdomen pelvis 06/11/2021   Total collapse of right lower lobe, chronic large heterogeneously enhancing mediastinal mass encasing the margarette, right main bronchus and extending into the right hilum and subcarinal lymph nodes. The hilar component has poorly defined margins and measuring 6 x 6 cm-extends behind field of view. Subcarinal lymphadenopathy measures 6.1 x 4.2 x 7 cm. Distal esophagus is severely depressed  There is compression of the posterior left atrium and partial encasement of the pulmonary veins and right pulmonary artery. Multiple enhancing nodular pleural based metastatic masses along anterior, lateral and posterior right, mid and lower chest and long right diaphragm-largest pleural nodule along right chest measuring 2.8 x 3.4 cm.   Large enhancing mass in posterior mediastinum sitting on the diaphragm measuring 4.1 x 4.5 x 3.3 cm. Additional enhancing nodules in the upper abdomen, around the GE junction, margins of the diaphragm, posterior margins of left hepatic lobe and in the gastrohepatic ligament. No evidence for hepatic metastatic disease. Several small enhancing right inguinal lymph nodes, largest measuring 1.8 cm. No bony metastatic disease. MRI Brain 06/11/2021 No evidence of metastatic disease     Review of Systems;  CONSTITUTIONAL: No fever, chills. Decline in appetite and energy level. ENMT: Eyes: No diplopia; Nose: No epistaxis. Mouth: No sore throat. + hoarseness  RESPIRATORY: + shortness of breath, cough. CARDIOVASCULAR: No chest pain, palpitations. GASTROINTESTINAL: + dysphagia  GENITOURINARY: No dysuria, urinary frequency, hematuria. NEURO: No syncope, presyncope, headache. Remainder:  ROS NEGATIVE    Past Medical History:      Diagnosis Date    Alcohol abuse     Lung mass     right    Seasonal allergies     Tobacco abuse      Past Surgical History:      Procedure Laterality Date    BRONCHOSCOPY N/A 6/3/2021    BRONCHOSCOPY W/EBUS FNA performed by Jaron Peoples MD at [address] [place] [address] N/A 6/3/2021    BRONCHOSCOPY performed by Jaron Peoples MD at [facility] 5/17/2021    RIGHT THORACENTESIS ULTRASOUND performed by Jaron Peoples MD at [address] [place] [address]       Family History:  No family history on file. Medications:  Reviewed and reconciled.     Social History:  Social History     Socioeconomic History    Marital status:      Spouse name: Not on file    Number of children: Not on file    Years of education: Not on file    Highest education level: Not on file   Occupational History    Not on file   Tobacco Use    Smoking status: Current Every Day Smoker     Packs/day: 0.50    Smokeless tobacco: Never Used    Tobacco comment: was 1 pack daily Substance and Sexual Activity    Alcohol use: Yes     Alcohol/week: 25.0 standard drinks     Types: 25 Cans of beer per week     Comment: daily 5 beers now was more    Drug use: Yes     Types: Marijuana     Comment: daily    Sexual activity: Not on file   Other Topics Concern    Not on file   Social History Narrative    Not on file     Social Determinants of Health     Financial Resource Strain:     Difficulty of Paying Living Expenses:    Food Insecurity:     Worried About Running Out of Food in the Last Year:     Ran Out of Food in the Last Year:    Transportation Needs:     Lack of Transportation (Medical):  Lack of Transportation (Non-Medical):    Physical Activity:     Days of Exercise per Week:     Minutes of Exercise per Session:    Stress:     Feeling of Stress :    Social Connections:     Frequency of Communication with Friends and Family:     Frequency of Social Gatherings with Friends and Family:     Attends Anabaptism Services:     Active Member of Clubs or Organizations:     Attends Club or Organization Meetings:     Marital Status:    Intimate Partner Violence:     Fear of Current or Ex-Partner:     Emotionally Abused:     Physically Abused:     Sexually Abused: Allergies:  No Known Allergies    Physical Exam:  /61   Pulse 91   Temp 97.7 °F (36.5 °C)   Ht 5' 7\" (1.702 m)   Wt 106 lb 3.2 oz (48.2 kg)   SpO2 97%   BMI 16.63 kg/m²   GENERAL: Alert, oriented x 3, tired  HEENT: PERRLA; EOMI. Oropharynx clear. NECK: Supple. Without lymphadenopathy. LUNGS: scattered wheezing tangela  CARDIOVASCULAR: Regular rate. No murmurs, rubs or gallops. ABDOMEN: Soft. Non-tender, non-distended. Positive bowel sounds. EXTREMITIES: Without clubbing or cyanosis or edema. NEUROLOGIC: No focal deficits.    ECOG PS 1-2    Impression/Plan:  61 y/o male with Extensive SCLC     CTA pulmonary 05/07/2021 with multiple enlarged prevascular, paratracheal and subcarinal lymph nodes effects reviewed he agreed to proceed. Mediport placement. RTC as soon as authorization obtained for Cycle # 1 Carboplatin + Etoposide + Tecentriq. Palliative care team consulted for sx managemet    Thank you for allowing us to participate in the care of Mr. Najma Parada, APRN - CNP      The patient was seen and examined, I agree with GLORIA Melendez's H&P, assessment and plan as outlined.   06/14/2021  Abraham Prabhakar MD

## 2021-06-14 ENCOUNTER — HOSPITAL ENCOUNTER (OUTPATIENT)
Dept: INFUSION THERAPY | Age: 61
Discharge: HOME | End: 2021-06-14
Payer: COMMERCIAL

## 2021-06-14 ENCOUNTER — OFFICE VISIT (OUTPATIENT)
Dept: ONCOLOGY | Age: 61
End: 2021-06-14
Payer: COMMERCIAL

## 2021-06-14 ENCOUNTER — TELEPHONE (OUTPATIENT)
Dept: CASE MANAGEMENT | Age: 61
End: 2021-06-14

## 2021-06-14 VITALS
BODY MASS INDEX: 16.67 KG/M2 | WEIGHT: 106.2 LBS | OXYGEN SATURATION: 97 % | HEART RATE: 91 BPM | SYSTOLIC BLOOD PRESSURE: 101 MMHG | HEIGHT: 67 IN | TEMPERATURE: 97.7 F | DIASTOLIC BLOOD PRESSURE: 61 MMHG

## 2021-06-14 DIAGNOSIS — C34.90 SMALL CELL LUNG CANCER (HCC): Primary | ICD-10-CM

## 2021-06-14 DIAGNOSIS — C34.90 SMALL CELL LUNG CANCER (HCC): ICD-10-CM

## 2021-06-14 LAB
ALBUMIN SERPL-MCNC: 3.9 G/DL (ref 3.5–5.2)
ALP BLD-CCNC: 74 U/L (ref 40–129)
ALT SERPL-CCNC: 9 U/L (ref 0–40)
ANION GAP SERPL CALCULATED.3IONS-SCNC: 15 MMOL/L (ref 7–16)
AST SERPL-CCNC: 14 U/L (ref 0–39)
BASOPHILS ABSOLUTE: 0.03 E9/L (ref 0–0.2)
BASOPHILS RELATIVE PERCENT: 0.4 % (ref 0–2)
BILIRUB SERPL-MCNC: 0.6 MG/DL (ref 0–1.2)
BUN BLDV-MCNC: 18 MG/DL (ref 6–20)
CALCIUM SERPL-MCNC: 9.7 MG/DL (ref 8.6–10.2)
CHLORIDE BLD-SCNC: 97 MMOL/L (ref 98–107)
CO2: 29 MMOL/L (ref 22–29)
CREAT SERPL-MCNC: 0.7 MG/DL (ref 0.7–1.2)
EOSINOPHILS ABSOLUTE: 0.04 E9/L (ref 0.05–0.5)
EOSINOPHILS RELATIVE PERCENT: 0.5 % (ref 0–6)
GFR AFRICAN AMERICAN: >60
GFR NON-AFRICAN AMERICAN: >60 ML/MIN/1.73
GLUCOSE BLD-MCNC: 116 MG/DL (ref 74–99)
HCT VFR BLD CALC: 40.5 % (ref 37–54)
HEMOGLOBIN: 13.2 G/DL (ref 12.5–16.5)
IMMATURE GRANULOCYTES #: 0.02 E9/L
IMMATURE GRANULOCYTES %: 0.3 % (ref 0–5)
LACTATE DEHYDROGENASE: 161 U/L (ref 135–225)
LYMPHOCYTES ABSOLUTE: 1.55 E9/L (ref 1.5–4)
LYMPHOCYTES RELATIVE PERCENT: 20.4 % (ref 20–42)
MCH RBC QN AUTO: 28.8 PG (ref 26–35)
MCHC RBC AUTO-ENTMCNC: 32.6 % (ref 32–34.5)
MCV RBC AUTO: 88.4 FL (ref 80–99.9)
MONOCYTES ABSOLUTE: 0.88 E9/L (ref 0.1–0.95)
MONOCYTES RELATIVE PERCENT: 11.6 % (ref 2–12)
NEUTROPHILS ABSOLUTE: 5.08 E9/L (ref 1.8–7.3)
NEUTROPHILS RELATIVE PERCENT: 66.8 % (ref 43–80)
PDW BLD-RTO: 14.2 FL (ref 11.5–15)
PLATELET # BLD: 270 E9/L (ref 130–450)
PMV BLD AUTO: 7.9 FL (ref 7–12)
POTASSIUM SERPL-SCNC: 4 MMOL/L (ref 3.5–5)
RBC # BLD: 4.58 E12/L (ref 3.8–5.8)
SODIUM BLD-SCNC: 141 MMOL/L (ref 132–146)
TOTAL PROTEIN: 8.3 G/DL (ref 6.4–8.3)
WBC # BLD: 7.6 E9/L (ref 4.5–11.5)

## 2021-06-14 PROCEDURE — 36415 COLL VENOUS BLD VENIPUNCTURE: CPT

## 2021-06-14 PROCEDURE — G8427 DOCREV CUR MEDS BY ELIG CLIN: HCPCS | Performed by: INTERNAL MEDICINE

## 2021-06-14 PROCEDURE — G8419 CALC BMI OUT NRM PARAM NOF/U: HCPCS | Performed by: INTERNAL MEDICINE

## 2021-06-14 PROCEDURE — 99245 OFF/OP CONSLTJ NEW/EST HI 55: CPT | Performed by: INTERNAL MEDICINE

## 2021-06-14 PROCEDURE — 99214 OFFICE O/P EST MOD 30 MIN: CPT

## 2021-06-14 RX ORDER — METHYLPREDNISOLONE 4 MG/1
TABLET ORAL
Qty: 1 KIT | Refills: 0 | Status: SHIPPED
Start: 2021-06-14 | End: 2021-06-14 | Stop reason: ALTCHOICE

## 2021-06-14 RX ORDER — ONDANSETRON 4 MG/1
4 TABLET, ORALLY DISINTEGRATING ORAL EVERY 8 HOURS PRN
Qty: 60 TABLET | Refills: 1 | Status: SHIPPED | OUTPATIENT
Start: 2021-06-14

## 2021-06-14 NOTE — PROGRESS NOTES
Mary Sherry  1960 62 y.o. Referring Physician: Dr Rosemarie Lee    PCP: Pranav Collier, DO    Vitals:    06/14/21 1255   BP: 101/61   Pulse: 91   Temp: 97.7 °F (36.5 °C)   SpO2: 97%        Wt Readings from Last 3 Encounters:   06/14/21 106 lb 3.2 oz (48.2 kg)   06/03/21 115 lb (52.2 kg)   05/17/21 132 lb (59.9 kg)        Body mass index is 16.63 kg/m². Chief Complaint:   Chief Complaint   Patient presents with    New Patient     Lung cancer         Cancer Staging  No matching staging information was found for the patient. Prior Radiation Therapy? NO-Unknown pt states when he was under 7years old he saw a dr yearly for some kind of chest scan and also had to have something \"shrunken in the ear\"    Concurrent Chemo/radiation? NO    Prior Chemotherapy? NO    Prior Hormonal Therapy? NO    Head and Neck Cancer? No, patient does NOT have HN cancer. Current Outpatient Medications:     albuterol sulfate  (90 Base) MCG/ACT inhaler, Inhale 2 puffs into the lungs 4 times daily as needed for Wheezing, Disp: 1 Inhaler, Rfl: 0    acetaminophen (TYLENOL) 325 MG tablet, Take 650 mg by mouth every 6 hours as needed for Pain (Patient not taking: Reported on 6/14/2021), Disp: , Rfl:     Loratadine (CLARITIN PO), Take by mouth daily as needed  (Patient not taking: Reported on 6/14/2021), Disp: , Rfl:        Past Medical History:   Diagnosis Date    Alcohol abuse     Lung mass     right    Seasonal allergies     Tobacco abuse        Past Surgical History:   Procedure Laterality Date    BRONCHOSCOPY N/A 6/3/2021    BRONCHOSCOPY W/EBUS FNA performed by Татьяна Cordero MD at [address] [place] 6/3/2021    BRONCHOSCOPY performed by Татьяна Cordero MD at [facility] 5/17/2021    RIGHT THORACENTESIS ULTRASOUND performed by Татьяна Cordero MD at [address] [place] [address]         History reviewed.  No pertinent family history. Social History     Socioeconomic History    Marital status:      Spouse name: Not on file    Number of children: Not on file    Years of education: Not on file    Highest education level: Not on file   Occupational History    Not on file   Tobacco Use    Smoking status: Current Every Day Smoker     Packs/day: 0.50    Smokeless tobacco: Never Used    Tobacco comment: was 1 pack daily   Substance and Sexual Activity    Alcohol use: Yes     Alcohol/week: 25.0 standard drinks     Types: 25 Cans of beer per week     Comment: daily 5 beers now was more    Drug use: Yes     Types: Marijuana     Comment: daily    Sexual activity: Not on file   Other Topics Concern    Not on file   Social History Narrative    Not on file     Social Determinants of Health     Financial Resource Strain:     Difficulty of Paying Living Expenses:    Food Insecurity:     Worried About Running Out of Food in the Last Year:     Ran Out of Food in the Last Year:    Transportation Needs:     Lack of Transportation (Medical):      Lack of Transportation (Non-Medical):    Physical Activity:     Days of Exercise per Week:     Minutes of Exercise per Session:    Stress:     Feeling of Stress :    Social Connections:     Frequency of Communication with Friends and Family:     Frequency of Social Gatherings with Friends and Family:     Attends Shinto Services:     Active Member of Clubs or Organizations:     Attends Club or Organization Meetings:     Marital Status:    Intimate Partner Violence:     Fear of Current or Ex-Partner:     Emotionally Abused:     Physically Abused:     Sexually Abused:            Occupation: Self Employed  Retired:  NO          REVIEW OF SYSTEMS: <<For Level 5, 10 or more systems>>     Pacemaker/Defibulator/ICD:  No    Mediport: No           FALLS RISK SCREENING ASSESSMENT    Instructions:  Assess the patient and [place] the appropriate indicators that are present for fall risk identification. Total the numbers circled and assign a fall risk score from Table 2.  Reassess patient at a minimum every 12 weeks or with status change. Assessment   Date  6/14/2021     1. Mental Ability: confusion/cognitively impaired No - 0       2. Elimination Issues: incontinence, frequency No - 0       3. Ambulatory: use of assistive devices (walker, cane, off-loading devices), attached to equipment (IV pole, oxygen) No - 0     4. Sensory Limitations: dizziness, vertigo, impaired vision No - 0       5. Age Less than 65 years - 0       6. Medication: diuretics, strong analgesics, hypnotics, sedatives, antihypertensive agents   No - 0   7. Falls:  recent history of falls within the last 3 months (not to include slipping or tripping)   No - 0   TOTAL 0    If score of 4 or greater was education given? No       TABLE 2   Risk Score Risk Level Plan of Care   0-3 Little or  No Risk 1. Provide assistance as indicated for ambulation activities  2. Reorient confused/cognitively impaired patient  3. Call-light/bell within patient's reach  4. Chair/bed in low position, stretcher/bed with siderails up except when performing patient care activities  5. Educate patient/family/caregiver on falls prevention  6.  Reassess in 12 weeks or with any noted change in patient condition which places them at a risk for a fall   4-6 Moderate Risk 1. Provide assistance as indicated for ambulation activities  2. Reorient confused/cognitively impaired patient  3. Call-light/bell within patient's reach  4. Chair/bed in low position, stretcher/bed with siderails up except when performing patient care activities  5. Educate patient/family/caregiver on falls prevention  6. Falls risk precaution (Yellow sticker Level II) placed on patient chart   7 or   Higher High Risk 1. Place patient in easily observable treatment room  2. Patient attended at all times by family member or staff  3.   Provide assistance as indicated for ambulation activities  4. Reorient confused/cognitively impaired patient  5. Call-light/bell within patient's reach  6. Chair/bed in low position, stretcher/bed with siderails up except when performing patient care activities  7. Educate patient/family/caregiver on falls prevention  8. Falls risk precaution (Yellow sticker Level III) placed on patient chart           MALNUTRITION RISK SCREENING ASSESSMENT    Instructions:  Assess the patient and enter the appropriate indicators that are present for nutrition risk identification. Total the numbers entered and assign a risk score. Follow the appropriate action for total score listed below. Assessment   Date  6/14/2021     1. Have you lost weight without trying? 2- Yes, 5.1 kg to 10 kg     2. Have you been eating poorly because of a decreased appetite? 2- Yes   3. Do you have a diagnosis of head and neck cancer?       0- No                                                                                    TOTAL 4        Score of 0-1: No action  Score 2 or greater:  · For Non-Diabetic Patient: Recommend adding Ensure Enlive 2 x daily and provide patient with Ensure wellness bag with coupons  · For Diabetic Patient: Recommend adding Glucerna Shake 2 x daily and provide patient with Glucerna Wellness bag with coupons  · Route to the dietitian via [address]    · Are you having  difficulty performing daily routine tasks  due to fatigue or weakness (ie: bathing/showering, dressing, housework, meal prep, work, child Germain Pritchard): No     · Do you have any arm flexibility/ROM restrictions, swelling or pain that limit activity: No     · Any changes in memory, attention/focus that impact daily activities: No     · Do you avoid participation in leisure/social activity due weakness, fatigue or pain: No     ARE ANY OF THE ABOVE ARE ANSWERED YES: No          PT ASSESSMENT FOR REFERRAL    · Have you had any recent falls in past 2 months: No     · Do you have difficulty  going up/down stairs: No     · Are you having difficulty walking: No     · Do you often hold onto furniture/environmental supports or feel off balance when you are walking: No     · Do you need to take rest breaks when you are walking: No     · Any pain on scale of 1-10 that limits your mobility: Yes 4/10-back pain when laying for sleep    ARE ANY OF THE ABOVE ARE ANSWERED YES: No             PREHAB AUDIOLOGY REFERRAL    - Is patient planned to receive Cisplatin? No. This patient is not planned to start Cisplatin. - Is patient complaining of new onset hearing loss? No. Patient is not complaining of new onset hearing loss.         Feli Shelby RN

## 2021-06-14 NOTE — TELEPHONE ENCOUNTER
Met with patient during his initial consultation with Dr. Britni Anderson  for his recent Small Cell Lung cancer diagnosis. Introduced myself and explained my role with patients receiving treatment at our center. Patient was friendly and receptive. He states his son is currently staying with him and is available to help as needed. He is having trouble eating and drinks a couple ensure but is not able to get in any solid foods at this time. Provided with samples of ensure and will forward to Rosemarie Rocha. Copy of his pathology findings given including cancer type. Instructed on next steps including PET scan and chemotherapy treatments per Dr. Danish Black recommendations and follow up care. Provided patient with  literature  on Patient Resource Lung Cancer, Understanding Lung Cancer, Lung Cancer Nutrition Tips and Community Transportation Resource List. Reviewed resources available including Social Work, Dietician, and Financial Navigator. Provided with my contact information and instructed patient to call me with questions or concerns. Verbalizes understanding. Patient appreciative of visit. Will continue to follow.

## 2021-06-14 NOTE — PROGRESS NOTES
Central scheduling called for STAT PET scan needed for chemo start.  He will be scheduled Tuesday 6/15 at 930

## 2021-06-14 NOTE — PROGRESS NOTES
Spoke with patient about chemotherapy with immunotherapy (Etoposide/Carboplatin and Atezolizumab). We went over the protocol to be used, what to expect as far as side effects, and when to call with problems. This was intended to reinforce the education done by Dr. Avelina Gardner. Patient is aware of labs to be drawn before treatment and provided lab location list.    Patient was provided medication handouts to the patient to take home and told patient to call if there are any questions once they had a chance to absorb the information. Patient was provided business cards for Shreya BRODY, RN-BC, Nurse Manager for Medical Oncology/[phone] and Mook Fuentes RN, Assistant Nurse Manager for [address] [place] [address], [phone] for all questions regarding Oncology Infusion restrictions and education. ** Handed printout from 6/1/2021, email per hSreya BRODY, RN-BC, Nurse Manager for Medical Oncology/[facility]:  Patients are allowed to have one visitor 25years old or older  no switching visitors during same day  Visitor needs to wait in the waiting room/car until the nurse caring for the patient is ready for them to come back. Visitor can contact front office (or  phone on [facility]) if waiting longer than 30 minutes. Visitor must keep mask on at all times. Only Medical Oncology Infusion staff are allowed in the Medical Oncology nutrition area. Provided patient with contacts for Oncology Nurse Paul, JOSE RAFAEL Marino, for follow up with paper work related to short term, FMLA, etc.    Provided patient with contact for dietitian, Luda Kraus, for any restrictions and education. Patient had the opportunity to ask questions with all questions being answered to their satisfaction. The patient expresses understanding and acceptance of instructions.     Electronically signed by Tim L[facility]pez [facility] on 6/14/2021 at 1:31 PM

## 2021-06-15 ENCOUNTER — TELEPHONE (OUTPATIENT)
Dept: INFUSION THERAPY | Age: 61
End: 2021-06-15

## 2021-06-15 ENCOUNTER — TELEPHONE (OUTPATIENT)
Dept: SURGERY | Age: 61
End: 2021-06-15

## 2021-06-15 ENCOUNTER — TELEPHONE (OUTPATIENT)
Dept: ONCOLOGY | Age: 61
End: 2021-06-15

## 2021-06-15 NOTE — PROGRESS NOTES
PET scan will be completed by 11 on Wednesday 06/16/2021, we will get patient to office after PET scan to begin his treatment as it is a 3 day treatment and can not begin on Thursday

## 2021-06-15 NOTE — TELEPHONE ENCOUNTER
Prior Authorization Form:      DEMOGRAPHICS:                     Patient Name:  Murali Carlin  Patient :  1960            Insurance:  Payor: [facility]  [address] / Plan: [facility] [address] / Product Type: *No Product type* /   Insurance ID Number:    Payor/Plan Subscr  Sex Relation Sub.  Ins. ID Effective Group Num   1. BENNIE Almeida 1960 Male Self [id number]                                    [address]         DIAGNOSIS & PROCEDURE:                       Procedure/Operation: MEDIPORT INSERT           CPT Code: 06751    Diagnosis:  LUNG CA    ICD10 Code: C34.90    [facility]    Surgeon:  Matthew Bishop INFORMATION:                          Date: 2021    Time: 10:30              Anesthesia:  MAC/TIVA                                                       Status:  Outpatient        Special Comments:         Electronically signed by Gucci Wylie MA on 6/15/2021 at 10:05 AM

## 2021-06-15 NOTE — PROGRESS NOTES
Bozena Bautista from PET scan imaging called this AM saying that there is no dose for his PET scan at any of the imaging facilities today therefore there is no way for him to get his PET scan today. Expressed this PET scan needs to be completed ASAP d/t needing to start his therapy for very aggressive cancer. She stated the VERY earliest he could get this done would be tomorrow 6/16 at 9 am to be completed by tentatively 11 am at [facility]. We will get him on the schedule Thursday morning to begin his treatment after PET scan.  Suze Estrada made aware

## 2021-06-15 NOTE — TELEPHONE ENCOUNTER
Left vm to tell kendra that we have added an appt with Dr. Mike Lobo after his completion of his PET CT on 06/16/2021. And that he will be also starting chemo to follow.

## 2021-06-16 ENCOUNTER — OFFICE VISIT (OUTPATIENT)
Dept: ONCOLOGY | Age: 61
End: 2021-06-16
Payer: COMMERCIAL

## 2021-06-16 ENCOUNTER — HOSPITAL ENCOUNTER (OUTPATIENT)
Dept: PET IMAGING | Age: 61
Discharge: HOME | End: 2021-06-18
Payer: COMMERCIAL

## 2021-06-16 ENCOUNTER — HOSPITAL ENCOUNTER (OUTPATIENT)
Dept: INFUSION THERAPY | Age: 61
Discharge: HOME | End: 2021-06-16
Payer: COMMERCIAL

## 2021-06-16 VITALS — DIASTOLIC BLOOD PRESSURE: 66 MMHG | HEART RATE: 80 BPM | SYSTOLIC BLOOD PRESSURE: 99 MMHG

## 2021-06-16 VITALS
TEMPERATURE: 97.9 F | SYSTOLIC BLOOD PRESSURE: 109 MMHG | BODY MASS INDEX: 16.43 KG/M2 | HEART RATE: 95 BPM | WEIGHT: 104.7 LBS | OXYGEN SATURATION: 98 % | HEIGHT: 67 IN | DIASTOLIC BLOOD PRESSURE: 68 MMHG

## 2021-06-16 DIAGNOSIS — C34.90 SMALL CELL LUNG CANCER (HCC): Primary | ICD-10-CM

## 2021-06-16 DIAGNOSIS — C34.90 SMALL CELL LUNG CANCER (HCC): ICD-10-CM

## 2021-06-16 LAB — METER GLUCOSE: 78 MG/DL (ref 74–99)

## 2021-06-16 PROCEDURE — G8419 CALC BMI OUT NRM PARAM NOF/U: HCPCS | Performed by: INTERNAL MEDICINE

## 2021-06-16 PROCEDURE — 6360000002 HC RX W HCPCS: Performed by: INTERNAL MEDICINE

## 2021-06-16 PROCEDURE — 82962 GLUCOSE BLOOD TEST: CPT

## 2021-06-16 PROCEDURE — 96417 CHEMO IV INFUS EACH ADDL SEQ: CPT

## 2021-06-16 PROCEDURE — 3430000000 HC RX DIAGNOSTIC RADIOPHARMACEUTICAL: Performed by: RADIOLOGY

## 2021-06-16 PROCEDURE — 3017F COLORECTAL CA SCREEN DOC REV: CPT | Performed by: INTERNAL MEDICINE

## 2021-06-16 PROCEDURE — 99214 OFFICE O/P EST MOD 30 MIN: CPT | Performed by: INTERNAL MEDICINE

## 2021-06-16 PROCEDURE — 96375 TX/PRO/DX INJ NEW DRUG ADDON: CPT

## 2021-06-16 PROCEDURE — 96366 THER/PROPH/DIAG IV INF ADDON: CPT

## 2021-06-16 PROCEDURE — 2580000003 HC RX 258: Performed by: INTERNAL MEDICINE

## 2021-06-16 PROCEDURE — G8427 DOCREV CUR MEDS BY ELIG CLIN: HCPCS | Performed by: INTERNAL MEDICINE

## 2021-06-16 PROCEDURE — 4004F PT TOBACCO SCREEN RCVD TLK: CPT | Performed by: INTERNAL MEDICINE

## 2021-06-16 PROCEDURE — 78814 PET IMAGE W/CT LMTD: CPT

## 2021-06-16 PROCEDURE — A9552 F18 FDG: HCPCS | Performed by: RADIOLOGY

## 2021-06-16 PROCEDURE — 96413 CHEMO IV INFUSION 1 HR: CPT

## 2021-06-16 RX ORDER — EPINEPHRINE 1 MG/ML
0.3 INJECTION, SOLUTION, CONCENTRATE INTRAVENOUS PRN
Status: CANCELLED | OUTPATIENT
Start: 2021-06-18

## 2021-06-16 RX ORDER — SODIUM CHLORIDE 9 MG/ML
20 INJECTION, SOLUTION INTRAVENOUS ONCE
Status: CANCELLED | OUTPATIENT
Start: 2021-06-16 | End: 2021-06-16

## 2021-06-16 RX ORDER — SODIUM CHLORIDE 0.9 % (FLUSH) 0.9 %
5-40 SYRINGE (ML) INJECTION PRN
Status: CANCELLED | OUTPATIENT
Start: 2021-06-17

## 2021-06-16 RX ORDER — DIPHENHYDRAMINE HYDROCHLORIDE 50 MG/ML
50 INJECTION INTRAMUSCULAR; INTRAVENOUS ONCE
Status: CANCELLED | OUTPATIENT
Start: 2021-06-16 | End: 2021-06-16

## 2021-06-16 RX ORDER — SODIUM CHLORIDE 9 MG/ML
INJECTION, SOLUTION INTRAVENOUS CONTINUOUS
Status: CANCELLED | OUTPATIENT
Start: 2021-06-18

## 2021-06-16 RX ORDER — SODIUM CHLORIDE 9 MG/ML
25 INJECTION, SOLUTION INTRAVENOUS PRN
Status: CANCELLED | OUTPATIENT
Start: 2021-06-17

## 2021-06-16 RX ORDER — SODIUM CHLORIDE 9 MG/ML
25 INJECTION, SOLUTION INTRAVENOUS PRN
Status: CANCELLED | OUTPATIENT
Start: 2021-06-16

## 2021-06-16 RX ORDER — SODIUM CHLORIDE 9 MG/ML
20 INJECTION, SOLUTION INTRAVENOUS ONCE
Status: COMPLETED | OUTPATIENT
Start: 2021-06-16 | End: 2021-06-16

## 2021-06-16 RX ORDER — EPINEPHRINE 1 MG/ML
0.3 INJECTION, SOLUTION, CONCENTRATE INTRAVENOUS PRN
Status: CANCELLED | OUTPATIENT
Start: 2021-06-17

## 2021-06-16 RX ORDER — SODIUM CHLORIDE 0.9 % (FLUSH) 0.9 %
5-40 SYRINGE (ML) INJECTION PRN
Status: CANCELLED | OUTPATIENT
Start: 2021-06-16

## 2021-06-16 RX ORDER — METHYLPREDNISOLONE SODIUM SUCCINATE 125 MG/2ML
125 INJECTION, POWDER, LYOPHILIZED, FOR SOLUTION INTRAMUSCULAR; INTRAVENOUS ONCE
Status: CANCELLED | OUTPATIENT
Start: 2021-06-16 | End: 2021-06-16

## 2021-06-16 RX ORDER — METHYLPREDNISOLONE SODIUM SUCCINATE 125 MG/2ML
125 INJECTION, POWDER, LYOPHILIZED, FOR SOLUTION INTRAMUSCULAR; INTRAVENOUS ONCE
Status: CANCELLED | OUTPATIENT
Start: 2021-06-17 | End: 2021-06-17

## 2021-06-16 RX ORDER — DEXAMETHASONE SODIUM PHOSPHATE 10 MG/ML
10 INJECTION, SOLUTION INTRAMUSCULAR; INTRAVENOUS ONCE
Status: CANCELLED | OUTPATIENT
Start: 2021-06-16

## 2021-06-16 RX ORDER — HEPARIN SODIUM (PORCINE) LOCK FLUSH IV SOLN 100 UNIT/ML 100 UNIT/ML
500 SOLUTION INTRAVENOUS PRN
Status: CANCELLED | OUTPATIENT
Start: 2021-06-17

## 2021-06-16 RX ORDER — DEXAMETHASONE SODIUM PHOSPHATE 10 MG/ML
10 INJECTION, SOLUTION INTRAMUSCULAR; INTRAVENOUS ONCE
Status: COMPLETED | OUTPATIENT
Start: 2021-06-16 | End: 2021-06-16

## 2021-06-16 RX ORDER — SODIUM CHLORIDE 9 MG/ML
25 INJECTION, SOLUTION INTRAVENOUS PRN
Status: CANCELLED | OUTPATIENT
Start: 2021-06-18

## 2021-06-16 RX ORDER — PALONOSETRON HYDROCHLORIDE 0.05 MG/ML
0.25 INJECTION, SOLUTION INTRAVENOUS ONCE
Status: COMPLETED | OUTPATIENT
Start: 2021-06-16 | End: 2021-06-16

## 2021-06-16 RX ORDER — METHYLPREDNISOLONE SODIUM SUCCINATE 125 MG/2ML
125 INJECTION, POWDER, LYOPHILIZED, FOR SOLUTION INTRAMUSCULAR; INTRAVENOUS ONCE
Status: CANCELLED | OUTPATIENT
Start: 2021-06-18 | End: 2021-06-18

## 2021-06-16 RX ORDER — EPINEPHRINE 1 MG/ML
0.3 INJECTION, SOLUTION, CONCENTRATE INTRAVENOUS PRN
Status: CANCELLED | OUTPATIENT
Start: 2021-06-16

## 2021-06-16 RX ORDER — HEPARIN SODIUM (PORCINE) LOCK FLUSH IV SOLN 100 UNIT/ML 100 UNIT/ML
500 SOLUTION INTRAVENOUS PRN
Status: CANCELLED | OUTPATIENT
Start: 2021-06-18

## 2021-06-16 RX ORDER — FLUDEOXYGLUCOSE F 18 200 MCI/ML
15 INJECTION, SOLUTION INTRAVENOUS
Status: COMPLETED | OUTPATIENT
Start: 2021-06-16 | End: 2021-06-16

## 2021-06-16 RX ORDER — SODIUM CHLORIDE 9 MG/ML
INJECTION, SOLUTION INTRAVENOUS CONTINUOUS
Status: CANCELLED | OUTPATIENT
Start: 2021-06-16

## 2021-06-16 RX ORDER — DEXAMETHASONE SODIUM PHOSPHATE 10 MG/ML
8 INJECTION, SOLUTION INTRAMUSCULAR; INTRAVENOUS ONCE
Status: CANCELLED | OUTPATIENT
Start: 2021-06-17

## 2021-06-16 RX ORDER — PALONOSETRON HYDROCHLORIDE 0.05 MG/ML
0.25 INJECTION, SOLUTION INTRAVENOUS ONCE
Status: CANCELLED | OUTPATIENT
Start: 2021-06-16

## 2021-06-16 RX ORDER — DIPHENHYDRAMINE HYDROCHLORIDE 50 MG/ML
50 INJECTION INTRAMUSCULAR; INTRAVENOUS ONCE
Status: CANCELLED | OUTPATIENT
Start: 2021-06-17 | End: 2021-06-17

## 2021-06-16 RX ORDER — DIPHENHYDRAMINE HYDROCHLORIDE 50 MG/ML
50 INJECTION INTRAMUSCULAR; INTRAVENOUS ONCE
Status: CANCELLED | OUTPATIENT
Start: 2021-06-18 | End: 2021-06-18

## 2021-06-16 RX ORDER — DEXAMETHASONE SODIUM PHOSPHATE 10 MG/ML
8 INJECTION, SOLUTION INTRAMUSCULAR; INTRAVENOUS ONCE
Status: CANCELLED | OUTPATIENT
Start: 2021-06-18

## 2021-06-16 RX ORDER — SODIUM CHLORIDE 0.9 % (FLUSH) 0.9 %
5-40 SYRINGE (ML) INJECTION PRN
Status: CANCELLED | OUTPATIENT
Start: 2021-06-18

## 2021-06-16 RX ORDER — SODIUM CHLORIDE 0.9 % (FLUSH) 0.9 %
5-40 SYRINGE (ML) INJECTION PRN
Status: DISCONTINUED | OUTPATIENT
Start: 2021-06-16 | End: 2021-06-17 | Stop reason: HOSPADM

## 2021-06-16 RX ORDER — HEPARIN SODIUM (PORCINE) LOCK FLUSH IV SOLN 100 UNIT/ML 100 UNIT/ML
500 SOLUTION INTRAVENOUS PRN
Status: CANCELLED | OUTPATIENT
Start: 2021-06-16

## 2021-06-16 RX ORDER — SODIUM CHLORIDE 9 MG/ML
INJECTION, SOLUTION INTRAVENOUS CONTINUOUS
Status: CANCELLED | OUTPATIENT
Start: 2021-06-17

## 2021-06-16 RX ORDER — SODIUM CHLORIDE 9 MG/ML
20 INJECTION, SOLUTION INTRAVENOUS ONCE
Status: CANCELLED | OUTPATIENT
Start: 2021-06-18 | End: 2021-06-18

## 2021-06-16 RX ORDER — SODIUM CHLORIDE 9 MG/ML
20 INJECTION, SOLUTION INTRAVENOUS ONCE
Status: CANCELLED | OUTPATIENT
Start: 2021-06-17 | End: 2021-06-17

## 2021-06-16 RX ADMIN — FOSAPREPITANT 150 MG: 150 INJECTION, POWDER, LYOPHILIZED, FOR SOLUTION INTRAVENOUS at 13:45

## 2021-06-16 RX ADMIN — DEXAMETHASONE SODIUM PHOSPHATE 10 MG: 10 INJECTION, SOLUTION INTRAMUSCULAR; INTRAVENOUS at 13:38

## 2021-06-16 RX ADMIN — SODIUM CHLORIDE, PRESERVATIVE FREE 10 ML: 5 INJECTION INTRAVENOUS at 13:33

## 2021-06-16 RX ADMIN — SODIUM CHLORIDE, PRESERVATIVE FREE 10 ML: 5 INJECTION INTRAVENOUS at 13:38

## 2021-06-16 RX ADMIN — CARBOPLATIN 500 MG: 10 INJECTION, SOLUTION INTRAVENOUS at 15:43

## 2021-06-16 RX ADMIN — PALONOSETRON 0.25 MG: 0.25 INJECTION, SOLUTION INTRAVENOUS at 13:35

## 2021-06-16 RX ADMIN — SODIUM CHLORIDE 20 ML/HR: 9 INJECTION, SOLUTION INTRAVENOUS at 13:34

## 2021-06-16 RX ADMIN — FLUDEOXYGLUCOSE F 18 15 MILLICURIE: 200 INJECTION, SOLUTION INTRAVENOUS at 09:00

## 2021-06-16 RX ADMIN — ETOPOSIDE 160 MG: 20 INJECTION INTRAVENOUS at 16:25

## 2021-06-16 RX ADMIN — ATEZOLIZUMAB 1200 MG: 1200 INJECTION, SOLUTION INTRAVENOUS at 14:24

## 2021-06-16 NOTE — PROGRESS NOTES
Nic Murphy  6/16/2021  Wt Readings from Last 10 Encounters:   06/16/21 104 lb 11.2 oz (47.5 kg)   06/14/21 106 lb 3.2 oz (48.2 kg)   06/03/21 115 lb (52.2 kg)   05/17/21 132 lb (59.9 kg)   05/07/21 132 lb (59.9 kg)   04/20/21 128 lb (58.1 kg)   04/06/21 128 lb (58.1 kg)   10/21/19 130 lb 12.8 oz (59.3 kg)   10/05/19 131 lb 12.8 oz (59.8 kg)   12/20/12 136 lb (61.7 kg)     Ht Readings from Last 1 Encounters:   06/16/21 5' 7\" (1.702 m)     Body mass index is 16.4 kg/m². Met with patient today during his first chemotherapy infusion. He is doing fair. Notes a weight loss of 20# in the past 6mo. Appetite is poor. He had one day where he got something stuck in his throat and could not eat for 3 days. This has since passed. Now he is being more careful with what he eats. He is drinking Ensure 2-3 per day, purchasing on his own. Goal for the chemotherapy to help shrink the tumor and allow for improved eating. However, at this time patient needs to meet at least 75% of needs via supplemental shake. Provided with samples of Boost VHC and will recommend that to meet his calorie needs. Setup through [address] Weight change: 20# loss in 3mo (16%)  Appetite: poor  N/V/D/C: dysphagia  Calculated Needs if applicable: [id number] ([id number]),  65-75gm PRO (48x1.5), 16-1700ml (48x32-35)    Pre-Hab Eligible?: no        Recommendations: Boost VHC TID to provide 1590kcal, 66gm PRO; Continue soft yogurt, pudding and applesauce to meet remaining needs.           ASPEN GUIDELINES FOR CLINICAL CHARACTERISTICS OF MALNUTRITION IN CHRONIC ILLNESS     Moderate Malnutrition  Severe Malnutrition    Energy intake  <75% energy intake compared to estimated needs for >1month <75% energy intake compared to estimated needs for >1month   Weight changes  5% x 1 month  7.5% x 3 months   10% x 6 months   20% x 1 year  >5% x 1 month  >7.5% x 3 months   >10% x 6 months   >20% x 1 year    Physical findings  Mild   Decrease subcutaneous fat    Decrease muscle mass     Increase fluid/edema   Severe  Decrease subcutaneous fat    Decrease muscle mass     Increase fluid/edema      Severe malnutrition evidenced by 16% weight loss in 3mo, < 75% of needs x3mo, dysphagia and visible temporal, orbital and clavicular wasting.     Manuel Morrow, RD, LD,RD,LD

## 2021-06-16 NOTE — PROGRESS NOTES
Department of [facility] Oncology  Attending Clinic Note    Reason for Visit: Follow-up on a patient with Extensive Small Cell Carcinoma     PCP:  Raúl Stratton DO    History of Present Illness:  62year old male with 36 PPY smoking history who presented to the ER 05/07/2021 for SOB  hemoptysis and associated dysphagia    CTA pulmonary 05/07/2021 with multiple enlarged prevascular, paratracheal and subcarinal lymph nodes measuring up to 3 cm. Right lower lobe/right hilar mass measuring 5cm encasing the right mainstem bronchus with stenosis. U/S guided Thoracentesis on 05/17/2021 (500 ml of fluid removed)  Right Pleural fluid Negative for malignant cells     Bronchoscopy/EBUS 06/03/2021  Lung mass, R4  POSITIVE FOR MALIGNANT CELLS   Small cell carcinoma   Cellblock is non-contributory. Bronchial wash, RLL  POSITIVE FOR MALIGNANT CELLS   Small cell carcinoma     Station 7  POSITIVE FOR MALIGNANT CELLS   Small cell carcinoma   Cellblock is similar. COMMENT:   IHC results are as follows: Pankeratin (+), TTF-1 (+), P40 (-), Chromogranin (-), Synaptophysin (+), LCA (-)     CT abdomen pelvis 06/11/2021   Total collapse of right lower lobe, chronic large heterogeneously enhancing mediastinal mass encasing the margarette, right main bronchus and extending into the right hilum and subcarinal lymph nodes. The hilar component has poorly defined margins and measuring 6 x 6 cm-extends behind field of view. Subcarinal lymphadenopathy measures 6.1 x 4.2 x 7 cm. Distal esophagus is severely depressed  There is compression of the posterior left atrium and partial encasement of the pulmonary veins and right pulmonary artery. Multiple enhancing nodular pleural based metastatic masses along anterior, lateral and posterior right, mid and lower chest and long right diaphragm-largest pleural nodule along right chest measuring 2.8 x 3.4 cm.   Large enhancing mass in posterior mediastinum sitting on the diaphragm measuring 4.1 x 4.5 x 3.3 cm. Additional enhancing nodules in the upper abdomen, around the GE junction, margins of the diaphragm, posterior margins of left hepatic lobe and in the gastrohepatic ligament. No evidence for hepatic metastatic disease. Several small enhancing right inguinal lymph nodes, largest measuring 1.8 cm. No bony metastatic disease. MRI Brain 06/11/2021 No evidence of metastatic disease      PET/CT on 06/15/2021 Mass centered in the right hilar region extending into the mediastinum consistent with history of small cell lung cancer with metabolically active metastatic mediastinal lymphadenopathy extending into the bilateral supraclavicular region and inferiorly into the retrocrural region. Metabolically active right pleural metastases with corresponding pleural effusion  Metabolically active nodules in the skin of the abdomen are concerning for metastatic disease. The esophagus is distended which appears to be secondary to compression by lower mediastinal lymphadenopathy. Intense activity in the collapsed right lower lobe could be due to additional malignancy or postobstructive pneumonia    Extensive SCLC, Recommended systemic therapy consisting of Carboplatin + Etoposide + Tecentriq. Side effects reviewed. He agreed to proceed. Cycle # 1 Carboplatin + Etoposide + Tecentriq is today 06/16/2021     Review of Systems;  CONSTITUTIONAL: No fever, chills. Decline in appetite and energy level. ENMT: Eyes: No diplopia; Nose: No epistaxis. Mouth: No sore throat. + hoarseness  RESPIRATORY: + shortness of breath, cough. CARDIOVASCULAR: No chest pain, palpitations. GASTROINTESTINAL: + dysphagia  GENITOURINARY: No dysuria, urinary frequency, hematuria. NEURO: No syncope, presyncope, headache.   Remainder:  ROS NEGATIVE    Past Medical History:      Diagnosis Date    Alcohol abuse     Cancer ([address]     Lung    Lung mass     right    Seasonal allergies     Tobacco abuse Medications:  Reviewed and reconciled. Allergies:  No Known Allergies    Physical Exam:  /68   Pulse 95   Temp 97.9 °F (36.6 °C)   Ht 5' 7\" (1.702 m)   Wt 104 lb 11.2 oz (47.5 kg)   SpO2 98%   BMI 16.40 kg/m²   GENERAL: Alert, oriented x 3, tired  HEENT: PERRLA; EOMI. Oropharynx clear. NECK: Supple. Without lymphadenopathy. LUNGS: scattered wheezing tangela  CARDIOVASCULAR: Regular rate. No murmurs, rubs or gallops. ABDOMEN: Soft. Non-tender, non-distended. Positive bowel sounds. EXTREMITIES: Without clubbing or cyanosis or edema. NEUROLOGIC: No focal deficits. ECOG PS 1-2    Impression/Plan:  63 y/o male with Extensive SCLC     CTA pulmonary 05/07/2021 with multiple enlarged prevascular, paratracheal and subcarinal lymph nodes measuring up to 3 cm. Right lower lobe/right hilar mass measuring 5cm encasing the right mainstem bronchus with stenosis. Bronchoscopy/EBUS 06/03/2021  Lung mass, R4  POSITIVE FOR MALIGNANT CELLS   Small cell carcinoma   Cellblock is non-contributory. Bronchial wash, RLL  POSITIVE FOR MALIGNANT CELLS   Small cell carcinoma     Station 7  POSITIVE FOR MALIGNANT CELLS   Small cell carcinoma   Cellblock is similar. COMMENT:   IHC results are as follows: Pankeratin (+), TTF-1 (+), P40 (-), Chromogranin (-), Synaptophysin (+), LCA (-)     CT abdomen pelvis 06/11/2021   Total collapse of right lower lobe, chronic large heterogeneously enhancing mediastinal mass encasing the margarette, right main bronchus and extending into the right hilum and subcarinal lymph nodes. The hilar component has poorly defined margins and measuring 6 x 6 cm-extends behind field of view. Subcarinal lymphadenopathy measures 6.1 x 4.2 x 7 cm. Distal esophagus is severely depressed  There is compression of the posterior left atrium and partial encasement of the pulmonary veins and right pulmonary artery.    Multiple enhancing nodular pleural based metastatic masses along anterior, lateral and posterior right, mid and lower chest and long right diaphragm-largest pleural nodule along right chest measuring 2.8 x 3.4 cm. Large enhancing mass in posterior mediastinum sitting on the diaphragm measuring 4.1 x 4.5 x 3.3 cm. Additional enhancing nodules in the upper abdomen, around the GE junction, margins of the diaphragm, posterior margins of left hepatic lobe and in the gastrohepatic ligament. No evidence for hepatic metastatic disease. Several small enhancing right inguinal lymph nodes, largest measuring 1.8 cm. No bony metastatic disease. MRI Brain 06/11/2021 No evidence of metastatic disease    PET/CT on 06/15/2021 Mass centered in the right hilar region extending into the mediastinum consistent with history of small cell lung cancer with metabolically active metastatic mediastinal lymphadenopathy extending into the bilateral supraclavicular region and inferiorly into the retrocrural region. Metabolically active right pleural metastases with corresponding pleural effusion  Metabolically active nodules in the skin of the abdomen are concerning for metastatic disease. The esophagus is distended which appears to be secondary to compression by lower mediastinal lymphadenopathy. Intense activity in the collapsed right lower lobe could be due to additional malignancy or postobstructive pneumonia  Imaging reviewed. Extensive SCLC, Recommended systemic therapy consisting of Carboplatin + Etoposide + Tecentriq. Side effects reviewed. He agreed to proceed. Cycle # 1 Carboplatin + Etoposide + Tecentriq is today 06/16/2021. Zofran prn for nausea. RTC 3 weeks for Cycle # 2 Carboplatin + Etoposide + Tecentriq. Palliative care team for sx management.      06/16/2021  Miracle Grimaldo MD

## 2021-06-17 ENCOUNTER — HOSPITAL ENCOUNTER (OUTPATIENT)
Age: 61
Discharge: HOME | DRG: 951 | End: 2021-06-19
Payer: COMMERCIAL

## 2021-06-17 ENCOUNTER — HOSPITAL ENCOUNTER (OUTPATIENT)
Dept: INFUSION THERAPY | Age: 61
Discharge: HOME | End: 2021-06-17
Payer: COMMERCIAL

## 2021-06-17 VITALS
HEART RATE: 75 BPM | TEMPERATURE: 98.2 F | DIASTOLIC BLOOD PRESSURE: 66 MMHG | SYSTOLIC BLOOD PRESSURE: 105 MMHG | OXYGEN SATURATION: 97 % | RESPIRATION RATE: 16 BRPM

## 2021-06-17 DIAGNOSIS — C34.90 SMALL CELL LUNG CANCER (HCC): Primary | ICD-10-CM

## 2021-06-17 DIAGNOSIS — Z01.818 PREOP TESTING: ICD-10-CM

## 2021-06-17 PROCEDURE — 96413 CHEMO IV INFUSION 1 HR: CPT

## 2021-06-17 PROCEDURE — U0005 INFEC AGEN DETEC AMPLI PROBE: HCPCS

## 2021-06-17 PROCEDURE — 96375 TX/PRO/DX INJ NEW DRUG ADDON: CPT

## 2021-06-17 PROCEDURE — 6360000002 HC RX W HCPCS: Performed by: INTERNAL MEDICINE

## 2021-06-17 PROCEDURE — U0003 INFECTIOUS AGENT DETECTION BY NUCLEIC ACID (DNA OR RNA); SEVERE ACUTE RESPIRATORY SYNDROME CORONAVIRUS 2 (SARS-COV-2) (CORONAVIRUS DISEASE [COVID-19]), AMPLIFIED PROBE TECHNIQUE, MAKING USE OF HIGH THROUGHPUT TECHNOLOGIES AS DESCRIBED BY CMS-2020-01-R: HCPCS

## 2021-06-17 PROCEDURE — 2580000003 HC RX 258: Performed by: INTERNAL MEDICINE

## 2021-06-17 PROCEDURE — 6370000000 HC RX 637 (ALT 250 FOR IP): Performed by: NURSE PRACTITIONER

## 2021-06-17 RX ORDER — DEXAMETHASONE SODIUM PHOSPHATE 10 MG/ML
8 INJECTION, SOLUTION INTRAMUSCULAR; INTRAVENOUS ONCE
Status: COMPLETED | OUTPATIENT
Start: 2021-06-17 | End: 2021-06-17

## 2021-06-17 RX ORDER — SODIUM CHLORIDE 9 MG/ML
20 INJECTION, SOLUTION INTRAVENOUS ONCE
Status: DISCONTINUED | OUTPATIENT
Start: 2021-06-17 | End: 2021-06-18 | Stop reason: HOSPADM

## 2021-06-17 RX ORDER — HEPARIN SODIUM (PORCINE) LOCK FLUSH IV SOLN 100 UNIT/ML 100 UNIT/ML
500 SOLUTION INTRAVENOUS PRN
Status: DISCONTINUED | OUTPATIENT
Start: 2021-06-17 | End: 2021-06-18 | Stop reason: HOSPADM

## 2021-06-17 RX ORDER — ACETAMINOPHEN 325 MG/1
650 TABLET ORAL EVERY 4 HOURS PRN
Status: DISCONTINUED | OUTPATIENT
Start: 2021-06-17 | End: 2021-06-18 | Stop reason: HOSPADM

## 2021-06-17 RX ORDER — SODIUM CHLORIDE 0.9 % (FLUSH) 0.9 %
5-40 SYRINGE (ML) INJECTION PRN
Status: DISCONTINUED | OUTPATIENT
Start: 2021-06-17 | End: 2021-06-18 | Stop reason: HOSPADM

## 2021-06-17 RX ADMIN — ACETAMINOPHEN 650 MG: 325 TABLET ORAL at 13:34

## 2021-06-17 RX ADMIN — SODIUM CHLORIDE 20 ML/HR: 9 INJECTION, SOLUTION INTRAVENOUS at 13:35

## 2021-06-17 RX ADMIN — ETOPOSIDE 160 MG: 20 INJECTION INTRAVENOUS at 13:58

## 2021-06-17 RX ADMIN — SODIUM CHLORIDE, PRESERVATIVE FREE 10 ML: 5 INJECTION INTRAVENOUS at 13:34

## 2021-06-17 RX ADMIN — DEXAMETHASONE SODIUM PHOSPHATE 8 MG: 10 INJECTION, SOLUTION INTRAMUSCULAR; INTRAVENOUS at 13:34

## 2021-06-17 ASSESSMENT — PAIN SCALES - GENERAL: PAINLEVEL_OUTOF10: 4

## 2021-06-18 ENCOUNTER — HOSPITAL ENCOUNTER (OUTPATIENT)
Dept: INFUSION THERAPY | Age: 61
End: 2021-06-18
Payer: COMMERCIAL

## 2021-06-18 ENCOUNTER — HOSPITAL ENCOUNTER (INPATIENT)
Age: 61
LOS: 14 days | Discharge: HOSPICE | DRG: 951 | End: 2021-07-02
Attending: EMERGENCY MEDICINE | Admitting: INTERNAL MEDICINE
Payer: COMMERCIAL

## 2021-06-18 ENCOUNTER — APPOINTMENT (OUTPATIENT)
Dept: GENERAL RADIOLOGY | Age: 61
DRG: 951 | End: 2021-06-18
Payer: COMMERCIAL

## 2021-06-18 ENCOUNTER — APPOINTMENT (OUTPATIENT)
Dept: CT IMAGING | Age: 61
DRG: 951 | End: 2021-06-18
Payer: COMMERCIAL

## 2021-06-18 DIAGNOSIS — G89.3 PAIN DUE TO NEOPLASM: ICD-10-CM

## 2021-06-18 DIAGNOSIS — J90 PLEURAL EFFUSION: ICD-10-CM

## 2021-06-18 DIAGNOSIS — J96.21 ACUTE ON CHRONIC RESPIRATORY FAILURE WITH HYPOXIA (HCC): Primary | ICD-10-CM

## 2021-06-18 DIAGNOSIS — J18.9 PNEUMONIA OF LEFT LOWER LOBE DUE TO INFECTIOUS ORGANISM: ICD-10-CM

## 2021-06-18 DIAGNOSIS — C34.90 SMALL CELL LUNG CARCINOMA (HCC): ICD-10-CM

## 2021-06-18 DIAGNOSIS — F41.9 ANXIETY: ICD-10-CM

## 2021-06-18 LAB
ALBUMIN SERPL-MCNC: 3.7 G/DL (ref 3.5–5.2)
ALP BLD-CCNC: 69 U/L (ref 40–129)
ALT SERPL-CCNC: 11 U/L (ref 0–40)
ANION GAP SERPL CALCULATED.3IONS-SCNC: 10 MMOL/L (ref 7–16)
AST SERPL-CCNC: 19 U/L (ref 0–39)
BASOPHILS ABSOLUTE: 0.01 E9/L (ref 0–0.2)
BASOPHILS RELATIVE PERCENT: 0.1 % (ref 0–2)
BILIRUB SERPL-MCNC: 0.4 MG/DL (ref 0–1.2)
BUN BLDV-MCNC: 17 MG/DL (ref 6–20)
CALCIUM SERPL-MCNC: 9.2 MG/DL (ref 8.6–10.2)
CHLORIDE BLD-SCNC: 101 MMOL/L (ref 98–107)
CO2: 29 MMOL/L (ref 22–29)
CREAT SERPL-MCNC: 0.5 MG/DL (ref 0.7–1.2)
EKG ATRIAL RATE: 82 BPM
EKG P AXIS: 66 DEGREES
EKG P-R INTERVAL: 146 MS
EKG Q-T INTERVAL: 390 MS
EKG QRS DURATION: 106 MS
EKG QTC CALCULATION (BAZETT): 455 MS
EKG R AXIS: 109 DEGREES
EKG T AXIS: 57 DEGREES
EKG VENTRICULAR RATE: 82 BPM
EOSINOPHILS ABSOLUTE: 0 E9/L (ref 0.05–0.5)
EOSINOPHILS RELATIVE PERCENT: 0 % (ref 0–6)
GFR AFRICAN AMERICAN: >60
GFR NON-AFRICAN AMERICAN: >60 ML/MIN/1.73
GLUCOSE BLD-MCNC: 92 MG/DL (ref 74–99)
HCT VFR BLD CALC: 39.2 % (ref 37–54)
HEMOGLOBIN: 12.8 G/DL (ref 12.5–16.5)
IMMATURE GRANULOCYTES #: 0.05 E9/L
IMMATURE GRANULOCYTES %: 0.5 % (ref 0–5)
LACTIC ACID, SEPSIS: 1.9 MMOL/L (ref 0.5–1.9)
LYMPHOCYTES ABSOLUTE: 1.26 E9/L (ref 1.5–4)
LYMPHOCYTES RELATIVE PERCENT: 12 % (ref 20–42)
MCH RBC QN AUTO: 29.4 PG (ref 26–35)
MCHC RBC AUTO-ENTMCNC: 32.7 % (ref 32–34.5)
MCV RBC AUTO: 90.1 FL (ref 80–99.9)
MONOCYTES ABSOLUTE: 0.21 E9/L (ref 0.1–0.95)
MONOCYTES RELATIVE PERCENT: 2 % (ref 2–12)
NEUTROPHILS ABSOLUTE: 8.95 E9/L (ref 1.8–7.3)
NEUTROPHILS RELATIVE PERCENT: 85.4 % (ref 43–80)
PDW BLD-RTO: 14.4 FL (ref 11.5–15)
PLATELET # BLD: 258 E9/L (ref 130–450)
PMV BLD AUTO: 8 FL (ref 7–12)
POTASSIUM SERPL-SCNC: 3.4 MMOL/L (ref 3.5–5)
RBC # BLD: 4.35 E12/L (ref 3.8–5.8)
SARS-COV-2, NAAT: NOT DETECTED
SARS-COV-2, PCR: NOT DETECTED
SODIUM BLD-SCNC: 140 MMOL/L (ref 132–146)
TOTAL PROTEIN: 7.4 G/DL (ref 6.4–8.3)
TROPONIN, HIGH SENSITIVITY: 10 NG/L (ref 0–11)
WBC # BLD: 10.5 E9/L (ref 4.5–11.5)

## 2021-06-18 PROCEDURE — 87635 SARS-COV-2 COVID-19 AMP PRB: CPT

## 2021-06-18 PROCEDURE — 96365 THER/PROPH/DIAG IV INF INIT: CPT

## 2021-06-18 PROCEDURE — 93005 ELECTROCARDIOGRAM TRACING: CPT | Performed by: STUDENT IN AN ORGANIZED HEALTH CARE EDUCATION/TRAINING PROGRAM

## 2021-06-18 PROCEDURE — 6360000004 HC RX CONTRAST MEDICATION: Performed by: RADIOLOGY

## 2021-06-18 PROCEDURE — 6360000002 HC RX W HCPCS: Performed by: STUDENT IN AN ORGANIZED HEALTH CARE EDUCATION/TRAINING PROGRAM

## 2021-06-18 PROCEDURE — 6360000002 HC RX W HCPCS: Performed by: FAMILY MEDICINE

## 2021-06-18 PROCEDURE — 94664 DEMO&/EVAL PT USE INHALER: CPT

## 2021-06-18 PROCEDURE — 93010 ELECTROCARDIOGRAM REPORT: CPT | Performed by: INTERNAL MEDICINE

## 2021-06-18 PROCEDURE — 99255 IP/OBS CONSLTJ NEW/EST HI 80: CPT | Performed by: INTERNAL MEDICINE

## 2021-06-18 PROCEDURE — 6370000000 HC RX 637 (ALT 250 FOR IP): Performed by: STUDENT IN AN ORGANIZED HEALTH CARE EDUCATION/TRAINING PROGRAM

## 2021-06-18 PROCEDURE — 6370000000 HC RX 637 (ALT 250 FOR IP): Performed by: FAMILY MEDICINE

## 2021-06-18 PROCEDURE — 71045 X-RAY EXAM CHEST 1 VIEW: CPT

## 2021-06-18 PROCEDURE — 94640 AIRWAY INHALATION TREATMENT: CPT

## 2021-06-18 PROCEDURE — 99285 EMERGENCY DEPT VISIT HI MDM: CPT

## 2021-06-18 PROCEDURE — 84484 ASSAY OF TROPONIN QUANT: CPT

## 2021-06-18 PROCEDURE — 83605 ASSAY OF LACTIC ACID: CPT

## 2021-06-18 PROCEDURE — 1200000000 HC SEMI PRIVATE

## 2021-06-18 PROCEDURE — 99223 1ST HOSP IP/OBS HIGH 75: CPT | Performed by: FAMILY MEDICINE

## 2021-06-18 PROCEDURE — 2580000003 HC RX 258: Performed by: FAMILY MEDICINE

## 2021-06-18 PROCEDURE — 85025 COMPLETE CBC W/AUTO DIFF WBC: CPT

## 2021-06-18 PROCEDURE — 2500000003 HC RX 250 WO HCPCS: Performed by: STUDENT IN AN ORGANIZED HEALTH CARE EDUCATION/TRAINING PROGRAM

## 2021-06-18 PROCEDURE — 2580000003 HC RX 258: Performed by: STUDENT IN AN ORGANIZED HEALTH CARE EDUCATION/TRAINING PROGRAM

## 2021-06-18 PROCEDURE — 80053 COMPREHEN METABOLIC PANEL: CPT

## 2021-06-18 PROCEDURE — 71275 CT ANGIOGRAPHY CHEST: CPT

## 2021-06-18 RX ORDER — IPRATROPIUM BROMIDE AND ALBUTEROL SULFATE 2.5; .5 MG/3ML; MG/3ML
1 SOLUTION RESPIRATORY (INHALATION)
Status: DISCONTINUED | OUTPATIENT
Start: 2021-06-18 | End: 2021-06-26

## 2021-06-18 RX ORDER — SODIUM CHLORIDE 0.9 % (FLUSH) 0.9 %
5-40 SYRINGE (ML) INJECTION EVERY 12 HOURS SCHEDULED
Status: DISCONTINUED | OUTPATIENT
Start: 2021-06-18 | End: 2021-07-02 | Stop reason: HOSPADM

## 2021-06-18 RX ORDER — NICOTINE 21 MG/24HR
1 PATCH, TRANSDERMAL 24 HOURS TRANSDERMAL DAILY
Status: DISCONTINUED | OUTPATIENT
Start: 2021-06-18 | End: 2021-07-02 | Stop reason: HOSPADM

## 2021-06-18 RX ORDER — ACETAMINOPHEN 650 MG/1
650 SUPPOSITORY RECTAL EVERY 6 HOURS PRN
Status: DISCONTINUED | OUTPATIENT
Start: 2021-06-18 | End: 2021-07-02 | Stop reason: HOSPADM

## 2021-06-18 RX ORDER — ACETAMINOPHEN 500 MG
500 TABLET ORAL EVERY 6 HOURS PRN
Status: ON HOLD | COMMUNITY
End: 2021-07-02 | Stop reason: HOSPADM

## 2021-06-18 RX ORDER — 0.9 % SODIUM CHLORIDE 0.9 %
1000 INTRAVENOUS SOLUTION INTRAVENOUS ONCE
Status: COMPLETED | OUTPATIENT
Start: 2021-06-18 | End: 2021-06-18

## 2021-06-18 RX ORDER — METHYLPREDNISOLONE 4 MG/1
4 TABLET ORAL SEE ADMIN INSTRUCTIONS
Status: ON HOLD | COMMUNITY
Start: 2021-06-14 | End: 2021-07-02 | Stop reason: HOSPADM

## 2021-06-18 RX ORDER — POLYETHYLENE GLYCOL 3350 17 G/17G
17 POWDER, FOR SOLUTION ORAL DAILY PRN
Status: DISCONTINUED | OUTPATIENT
Start: 2021-06-18 | End: 2021-07-02 | Stop reason: HOSPADM

## 2021-06-18 RX ORDER — SODIUM CHLORIDE 0.9 % (FLUSH) 0.9 %
5-40 SYRINGE (ML) INJECTION EVERY 12 HOURS SCHEDULED
Status: DISCONTINUED | OUTPATIENT
Start: 2021-06-18 | End: 2021-06-18

## 2021-06-18 RX ORDER — ONDANSETRON 4 MG/1
4 TABLET, ORALLY DISINTEGRATING ORAL EVERY 8 HOURS PRN
Status: DISCONTINUED | OUTPATIENT
Start: 2021-06-18 | End: 2021-07-02 | Stop reason: HOSPADM

## 2021-06-18 RX ORDER — FAMOTIDINE 20 MG/1
20 TABLET, FILM COATED ORAL 2 TIMES DAILY
Status: DISCONTINUED | OUTPATIENT
Start: 2021-06-18 | End: 2021-07-02 | Stop reason: HOSPADM

## 2021-06-18 RX ORDER — SODIUM CHLORIDE 9 MG/ML
25 INJECTION, SOLUTION INTRAVENOUS PRN
Status: DISCONTINUED | OUTPATIENT
Start: 2021-06-18 | End: 2021-06-18

## 2021-06-18 RX ORDER — SODIUM CHLORIDE 9 MG/ML
25 INJECTION, SOLUTION INTRAVENOUS PRN
Status: DISCONTINUED | OUTPATIENT
Start: 2021-06-18 | End: 2021-07-02 | Stop reason: HOSPADM

## 2021-06-18 RX ORDER — ONDANSETRON 2 MG/ML
4 INJECTION INTRAMUSCULAR; INTRAVENOUS EVERY 6 HOURS PRN
Status: DISCONTINUED | OUTPATIENT
Start: 2021-06-18 | End: 2021-07-02 | Stop reason: HOSPADM

## 2021-06-18 RX ORDER — SODIUM CHLORIDE 0.9 % (FLUSH) 0.9 %
5-40 SYRINGE (ML) INJECTION PRN
Status: DISCONTINUED | OUTPATIENT
Start: 2021-06-18 | End: 2021-06-18

## 2021-06-18 RX ORDER — IPRATROPIUM BROMIDE AND ALBUTEROL SULFATE 2.5; .5 MG/3ML; MG/3ML
1 SOLUTION RESPIRATORY (INHALATION) ONCE
Status: COMPLETED | OUTPATIENT
Start: 2021-06-18 | End: 2021-06-18

## 2021-06-18 RX ORDER — SODIUM CHLORIDE 0.9 % (FLUSH) 0.9 %
5-40 SYRINGE (ML) INJECTION PRN
Status: DISCONTINUED | OUTPATIENT
Start: 2021-06-18 | End: 2021-07-02 | Stop reason: HOSPADM

## 2021-06-18 RX ORDER — ACETAMINOPHEN 325 MG/1
650 TABLET ORAL EVERY 6 HOURS PRN
Status: DISCONTINUED | OUTPATIENT
Start: 2021-06-18 | End: 2021-07-02 | Stop reason: HOSPADM

## 2021-06-18 RX ADMIN — Medication 5 ML: at 21:57

## 2021-06-18 RX ADMIN — SODIUM CHLORIDE 1000 ML: 9 INJECTION, SOLUTION INTRAVENOUS at 08:53

## 2021-06-18 RX ADMIN — NYSTATIN 500000 UNITS: 500000 SUSPENSION ORAL at 21:36

## 2021-06-18 RX ADMIN — ENOXAPARIN SODIUM 40 MG: 40 INJECTION SUBCUTANEOUS at 16:46

## 2021-06-18 RX ADMIN — IPRATROPIUM BROMIDE AND ALBUTEROL SULFATE 1 AMPULE: .5; 3 SOLUTION RESPIRATORY (INHALATION) at 07:55

## 2021-06-18 RX ADMIN — FAMOTIDINE 20 MG: 20 TABLET ORAL at 21:36

## 2021-06-18 RX ADMIN — IPRATROPIUM BROMIDE AND ALBUTEROL SULFATE 1 AMPULE: 2.5; .5 SOLUTION RESPIRATORY (INHALATION) at 20:15

## 2021-06-18 RX ADMIN — IOPAMIDOL 75 ML: 755 INJECTION, SOLUTION INTRAVENOUS at 09:32

## 2021-06-18 RX ADMIN — DOXYCYCLINE 100 MG: 100 INJECTION, POWDER, LYOPHILIZED, FOR SOLUTION INTRAVENOUS at 10:58

## 2021-06-18 RX ADMIN — WATER 1000 MG: 1 INJECTION INTRAMUSCULAR; INTRAVENOUS; SUBCUTANEOUS at 10:54

## 2021-06-18 ASSESSMENT — ENCOUNTER SYMPTOMS
ABDOMINAL PAIN: 0
CHEST TIGHTNESS: 1
BACK PAIN: 0
NAUSEA: 0
SORE THROAT: 0
COUGH: 1
PHOTOPHOBIA: 0
WHEEZING: 0
SHORTNESS OF BREATH: 1
VOMITING: 0
RHINORRHEA: 0

## 2021-06-18 ASSESSMENT — PAIN SCALES - GENERAL
PAINLEVEL_OUTOF10: 0
PAINLEVEL_OUTOF10: 0

## 2021-06-18 NOTE — H&P
[facility] History and Physical      CHIEF COMPLAINT:    SOB    History of Present Illness:    Patient is a 63 yo male with Small Cell Lung Cancer who presented to the ED with worsening shortness of breath. He completed two chemotherapy treatments this week. Yesterday he felt worse with activity. He reports cough and phlegm production. He was given a Medrol dose pack by his Oncologist without improvement. He also was not getting relief from his inhalers. He still smokes 1/2 ppd. In addition to the SOB and phlegm production, he reports cervical neck spasms, dysphagia, decreased appetite and constipation. He denies fever, chills, headache or dizziness. In the ED he was found on CT to have advanced Emphysema with superimposed pneumonia which was new. He had a moderate sized right pleural effusion. Admission was advised. His Oncologist is Dr. Alivia Daniels. Informant(s) for H&P: Patient    REVIEW OF SYSTEMS:  A comprehensive review of systems was negative except for: what is in the HPI    PMH:  Past Medical History:   Diagnosis Date    Alcohol abuse     Cancer ([address]     Lung    Lung mass     right    Seasonal allergies     Tobacco abuse        Surgical History:  Past Surgical History:   Procedure Laterality Date    BRONCHOSCOPY N/A 6/3/2021    BRONCHOSCOPY W/EBUS FNA performed by Oneil Chowdhury MD at [address] [place] [address] N/A 6/3/2021    BRONCHOSCOPY performed by Oneil Chowdhury MD at [facility] 5/17/2021    RIGHT THORACENTESIS ULTRASOUND performed by Oneil Chowdhury MD at [address] [place] [address]         Medications Prior to Admission:    Prior to Admission medications    Medication Sig Start Date End Date Taking?  Authorizing Provider   ondansetron (ZOFRAN-ODT) 4 MG disintegrating tablet Take 1 tablet by mouth every 8 hours as needed for Nausea or Vomiting 6/14/21   Seema Ladd MD   acetaminophen (TYLENOL) 325 MG tablet Take 650 mg by mouth every 6 hours as needed for Pain     Historical Provider, MD       Allergies:    Patient has no known allergies. Social History:    reports that he has been smoking. He has been smoking about 0.50 packs per day. He has never used smokeless tobacco. He reports previous alcohol use of about 25.0 standard drinks of alcohol per week. He reports current drug use. Drug: Marijuana. Family History:   family history includes Heart Attack in his paternal grandmother; No Known Problems in his mother; Other in his father. PHYSICAL EXAM:  Vitals:  BP 92/64   Pulse 83   Temp 97.9 °F (36.6 °C)   Resp 14   Ht 5' 7\" (1.702 m)   Wt 106 lb (48.1 kg)   SpO2 97%   BMI 16.60 kg/m²     General Appearance: alert and oriented to person, place and time. Thin, cachectic male with hoarseness. He is in no acute distress  Skin: warm and dry  Head: normocephalic and atraumatic  Eyes: pupils equal, round, and reactive to light, extraocular eye movements intact, conjunctivae normal   2 cm in circumference  Neck: neck supple and non tender without mass. Left supraclavicular mass    Pulmonary/Chest: barrel chest noted, bronchial breath sounds bilaterally. Cardiovascular: normal rate, normal S1 and S2 and no carotid bruits  Abdomen: soft, non-tender, non-distended, normal bowel sounds, no masses or organomegaly  Extremities: no cyanosis, no clubbing and no edema  Neurologic: no cranial nerve deficit and speech normal        LABS:  Recent Labs     06/18/21  0743      K 3.4*      CO2 29   BUN 17   CREATININE 0.5*   GLUCOSE 92   CALCIUM 9.2       Recent Labs     06/18/21  0743   WBC 10.5   RBC 4.35   HGB 12.8   HCT 39.2   MCV 90.1   MCH 29.4   MCHC 32.7   RDW 14.4      MPV 8.0     Radiology:   CTA CHEST W CONTRAST   Final Result   1. There is no evidence of a pulmonary embolus. 2. Moderate size right pleural effusion   3.  Advanced emphysematous changes with superimposed pneumonia seen within the   left lower lobe and within the lingula. This finding is new when compared   with the previous CT scan of 06/11/2021 and PET-CT scan of 06/16/2021.   4. Large right perihilar mass with extension into the mediastinum consistent   with the history of metastatic small cell lung CA   5. Significant right hilar, mediastinal core right paratracheal and supra and   infraclavicular lymphadenopathy. 6. Pleural metastasis. Chronically distended esophagus secondary to the   metastatic lymphadenopathy within the inferior posterior mediastinum. XR CHEST PORTABLE   Final Result   No significant change             EKG:   Normal sinus rhythm  Possible Left atrial enlargement  Rightward axis  Possible Anterior infarct (cited on or before 07-MAY-2021)  Abnormal ECG  When compared with ECG of 07-MAY-2021 10:30,  No significant change was found  Confirmed by Ester Rodriguez ([id number]) on 6/18/2021 4:28:23 PM    ASSESSMENT:      Active Problems:    Acute on chronic respiratory failure with hypoxia (HCC)    Small Cell Lung Cancer    Tobacco Dependence     Hoarseness    Resolved Problems:    * No resolved hospital problems. *      PLAN:    1. Acute on Chronic Respiratory Failure with Hypoxia -  Continue respiratory support with oxygen and nebs. Pulmonary and Oncology consults. 2.  Small Cell Lung Cancer - Oncology consult. Await recommendations. 3.  Tobacco Dependence - Nicoderm 21 mg patch daily. 4.  Hoarseness - nystatin suspension qid. Code Status: Full  DVT prophylaxis: Lovenox    NOTE: This report was transcribed using voice recognition software. Every effort was made to ensure accuracy; however, inadvertent computerized transcription errors may be present.     Electronically signed by Colby Perez MD on 6/18/2021 at 11:15 AM

## 2021-06-18 NOTE — PLAN OF CARE
Problem: Falls - Risk of:  Goal: Will remain free from falls  Description: Will remain free from falls  6/18/2021 1708 by Olya Mcmillan RN  Outcome: Met This Shift  6/18/2021 1244 by Sumit Merino RN  Outcome: Met This Shift     Problem: Pain:  Goal: Pain level will decrease  Description: Pain level will decrease  6/18/2021 1708 by Olya Mcmillan RN  Outcome: Met This Shift  6/18/2021 1244 by Sumit Merino RN  Outcome: Met This Shift

## 2021-06-18 NOTE — PROGRESS NOTES
Database complete. Medications reconciled. Care plans and education initiated. Malgorzata Amaya states he received chemotherapy the past 2 days and was due again today. Oncologist is Dr. Alivia Daniels. Pt states he is set to have his mediport implanted 6/22/21.

## 2021-06-18 NOTE — ED PROVIDER NOTES
Decreased breath sounds and wheezing present. No rhonchi. Musculoskeletal:      Right lower leg: No edema. Left lower leg: No edema. Skin:     General: Skin is warm and dry. Neurological:      Mental Status: He is alert and oriented to person, place, and time. Procedures     MDM  Number of Diagnoses or Management Options  Acute on chronic respiratory failure with hypoxia (HCC)  Pleural effusion  Pneumonia of left lower lobe due to infectious organism  Diagnosis management comments: Patient is a 52-year-old male that presents emergency department for evaluation of shortness of breath. Patient ill-appearing and cachectic on presentation. While in the department it was noted that he is hypoxic with an oxygen saturation of 88% on room air. He was placed on 2 L nasal cannula with mild improvement of his oxygenation. CTA was performed which showed a moderate right-sided pleural effusion as well as emphysematous changes and a superimposed pneumonia on the left lower lobe. Patient's tumor appears consistent with previous. He was started on Rocephin and doxycycline. Plan to admit patient for further treatment evaluation of his hypoxia and evaluation by pulmonology. Discussed case with hospitalist who accepted patient for admission.            --------------------------------------------- PAST HISTORY ---------------------------------------------  Past Medical History:  has a past medical history of Alcohol abuse, Cancer ([facility] [address] Lung mass, Seasonal allergies, and Tobacco abuse. Past Surgical History:  has a past surgical history that includes hernia repair; Tonsillectomy; thoracentesis (Right, 5/17/2021); bronchoscopy (N/A, 6/3/2021); and bronchoscopy (N/A, 6/3/2021). Social History:  reports that he has been smoking. He has been smoking about 0.50 packs per day. He has never used smokeless tobacco. He reports previous alcohol use of about 25.0 standard drinks of alcohol per week.  He reports current drug use. Drug: Marijuana. Family History: family history includes Heart Attack in his paternal grandmother; No Known Problems in his mother; Other in his father. The patients home medications have been reviewed. Allergies: Patient has no known allergies.     -------------------------------------------------- RESULTS -------------------------------------------------    LABS:  Results for orders placed or performed during the hospital encounter of 06/18/21   COVID-19, Rapid    Specimen: Nasopharyngeal Swab   Result Value Ref Range    SARS-CoV-2, NAAT Not Detected Not Detected   Comprehensive Metabolic Panel   Result Value Ref Range    Sodium 140 132 - 146 mmol/L    Potassium 3.4 (L) 3.5 - 5.0 mmol/L    Chloride 101 98 - 107 mmol/L    CO2 29 22 - 29 mmol/L    Anion Gap 10 7 - 16 mmol/L    Glucose 92 74 - 99 mg/dL    BUN 17 6 - 20 mg/dL    CREATININE 0.5 (L) 0.7 - 1.2 mg/dL    GFR Non-African American >60 >=60 mL/min/1.73    GFR African American >60     Calcium 9.2 8.6 - 10.2 mg/dL    Total Protein 7.4 6.4 - 8.3 g/dL    Albumin 3.7 3.5 - 5.2 g/dL    Total Bilirubin 0.4 0.0 - 1.2 mg/dL    Alkaline Phosphatase 69 40 - 129 U/L    ALT 11 0 - 40 U/L    AST 19 0 - 39 U/L   CBC Auto Differential   Result Value Ref Range    WBC 10.5 4.5 - 11.5 E9/L    RBC 4.35 3.80 - 5.80 E12/L    Hemoglobin 12.8 12.5 - 16.5 g/dL    Hematocrit 39.2 37.0 - 54.0 %    MCV 90.1 80.0 - 99.9 fL    MCH 29.4 26.0 - 35.0 pg    MCHC 32.7 32.0 - 34.5 %    RDW 14.4 11.5 - 15.0 fL    Platelets 959 778 - 440 E9/L    MPV 8.0 7.0 - 12.0 fL    Neutrophils % 85.4 (H) 43.0 - 80.0 %    Immature Granulocytes % 0.5 0.0 - 5.0 %    Lymphocytes % 12.0 (L) 20.0 - 42.0 %    Monocytes % 2.0 2.0 - 12.0 %    Eosinophils % 0.0 0.0 - 6.0 %    Basophils % 0.1 0.0 - 2.0 %    Neutrophils Absolute 8.95 (H) 1.80 - 7.30 E9/L    Immature Granulocytes # 0.05 E9/L    Lymphocytes Absolute 1.26 (L) 1.50 - 4.00 E9/L    Monocytes Absolute 0.21 0.10 - 0.95 E9/L Eosinophils Absolute 0.00 (L) 0.05 - 0.50 E9/L    Basophils Absolute 0.01 0.00 - 0.20 E9/L   Troponin   Result Value Ref Range    Troponin, High Sensitivity 10 0 - 11 ng/L   Lactate, Sepsis   Result Value Ref Range    Lactic Acid, Sepsis 1.9 0.5 - 1.9 mmol/L   EKG 12 Lead   Result Value Ref Range    Ventricular Rate 82 BPM    Atrial Rate 82 BPM    P-R Interval 146 ms    QRS Duration 106 ms    Q-T Interval 390 ms    QTc Calculation (Bazett) 455 ms    P Axis 66 degrees    R Axis 109 degrees    T Axis 57 degrees       RADIOLOGY:  CTA CHEST W CONTRAST   Final Result   1. There is no evidence of a pulmonary embolus. 2. Moderate size right pleural effusion   3. Advanced emphysematous changes with superimposed pneumonia seen within the   left lower lobe and within the lingula. This finding is new when compared   with the previous CT scan of 06/11/2021 and PET-CT scan of 06/16/2021.   4. Large right perihilar mass with extension into the mediastinum consistent   with the history of metastatic small cell lung CA   5. Significant right hilar, mediastinal core right paratracheal and supra and   infraclavicular lymphadenopathy. 6. Pleural metastasis. Chronically distended esophagus secondary to the   metastatic lymphadenopathy within the inferior posterior mediastinum. XR CHEST PORTABLE   Final Result   No significant change             EKG:  This EKG is signed and interpreted by me. Rate: 82  Rhythm: Sinus  Interpretation: Normal sinus rhythm, right axis deviation, age-indeterminate anterior infarct, QTc 455  Comparison: stable as compared to patient's most recent EKG      ------------------------- NURSING NOTES AND VITALS REVIEWED ---------------------------  Date / Time Roomed:  6/18/2021  6:54 AM  ED Bed Assignment:  28/28    The nursing notes within the ED encounter and vital signs as below have been reviewed.      Patient Vitals for the past 24 hrs:   BP Temp Pulse Resp SpO2 Height Weight   06/18/21 0835 92/64 -- 83 14 97 % -- --   06/18/21 0832 -- -- -- -- 93 % -- --   06/18/21 0829 -- -- -- -- (!) 88 % -- --   06/18/21 0709 -- -- 94 -- 92 % -- --   06/18/21 0651 132/60 97.9 °F (36.6 °C) -- 22 -- 5' 7\" (1.702 m) 106 lb (48.1 kg)       Oxygen Saturation Interpretation: Abnormal    ------------------------------------------ PROGRESS NOTES ------------------------------------------  Re-evaluation(s):  Time: 0845  Patients symptoms show no change  Repeat physical examination is not changed    Counseling:  I have spoken with the patient and discussed todays results, in addition to providing specific details for the plan of care and counseling regarding the diagnosis and prognosis. Their questions are answered at this time and they are agreeable with the plan of admission.    --------------------------------- ADDITIONAL PROVIDER NOTES ---------------------------------  Consultations:  Spoke with Dr. Cristina Gold. Discussed case. They will admit the patient. This patient's ED course included: a personal history and physicial examination, re-evaluation prior to disposition, IV medications, cardiac monitoring and continuous pulse oximetry    This patient has remained hemodynamically stable during their ED course. Diagnosis:  1. Acute on chronic respiratory failure with hypoxia (HCC)    2. Pneumonia of left lower lobe due to infectious organism    3. Pleural effusion        Disposition:  Patient's disposition: Admit to telemetry  Patient's condition is stable.          Leoncio Weems,   Resident  06/18/21 1134

## 2021-06-18 NOTE — ED NOTES
----- Message from Lucrecia Dawn RN sent at 10/9/2020  1:46 PM CDT -----  Regarding: CHF weekly  CHF weekly 30D  [facility] 10/9/20 follow acutely through 11/9/20     pts oxygen sat at 88% RA. Placed on 2 L NC. Attending aware     Dino Freeman.  Bisi Cruz RN  06/18/21 [id number]

## 2021-06-19 LAB
ALBUMIN SERPL-MCNC: 3.5 G/DL (ref 3.5–5.2)
ALP BLD-CCNC: 71 U/L (ref 40–129)
ALT SERPL-CCNC: 10 U/L (ref 0–40)
ANION GAP SERPL CALCULATED.3IONS-SCNC: 7 MMOL/L (ref 7–16)
AST SERPL-CCNC: 17 U/L (ref 0–39)
BASOPHILS ABSOLUTE: 0 E9/L (ref 0–0.2)
BASOPHILS RELATIVE PERCENT: 0 % (ref 0–2)
BILIRUB SERPL-MCNC: 0.5 MG/DL (ref 0–1.2)
BUN BLDV-MCNC: 14 MG/DL (ref 6–20)
CALCIUM SERPL-MCNC: 8.9 MG/DL (ref 8.6–10.2)
CHLORIDE BLD-SCNC: 99 MMOL/L (ref 98–107)
CO2: 32 MMOL/L (ref 22–29)
CREAT SERPL-MCNC: 0.5 MG/DL (ref 0.7–1.2)
EOSINOPHILS ABSOLUTE: 0.07 E9/L (ref 0.05–0.5)
EOSINOPHILS RELATIVE PERCENT: 0.9 % (ref 0–6)
GFR AFRICAN AMERICAN: >60
GFR NON-AFRICAN AMERICAN: >60 ML/MIN/1.73
GLUCOSE BLD-MCNC: 88 MG/DL (ref 74–99)
HCT VFR BLD CALC: 40.6 % (ref 37–54)
HEMOGLOBIN: 12.4 G/DL (ref 12.5–16.5)
LYMPHOCYTES ABSOLUTE: 0.4 E9/L (ref 1.5–4)
LYMPHOCYTES RELATIVE PERCENT: 5.2 % (ref 20–42)
MAGNESIUM: 2.3 MG/DL (ref 1.6–2.6)
MCH RBC QN AUTO: 28.4 PG (ref 26–35)
MCHC RBC AUTO-ENTMCNC: 30.5 % (ref 32–34.5)
MCV RBC AUTO: 92.9 FL (ref 80–99.9)
MONOCYTES ABSOLUTE: 0.08 E9/L (ref 0.1–0.95)
MONOCYTES RELATIVE PERCENT: 0.9 % (ref 2–12)
NEUTROPHILS ABSOLUTE: 7.44 E9/L (ref 1.8–7.3)
NEUTROPHILS RELATIVE PERCENT: 93 % (ref 43–80)
NUCLEATED RED BLOOD CELLS: 0 /100 WBC
PDW BLD-RTO: 14.5 FL (ref 11.5–15)
PLATELET # BLD: 226 E9/L (ref 130–450)
PMV BLD AUTO: 8 FL (ref 7–12)
POTASSIUM REFLEX MAGNESIUM: 3.2 MMOL/L (ref 3.5–5)
RBC # BLD: 4.37 E12/L (ref 3.8–5.8)
SODIUM BLD-SCNC: 138 MMOL/L (ref 132–146)
TOTAL PROTEIN: 7.2 G/DL (ref 6.4–8.3)
WBC # BLD: 8 E9/L (ref 4.5–11.5)

## 2021-06-19 PROCEDURE — 85025 COMPLETE CBC W/AUTO DIFF WBC: CPT

## 2021-06-19 PROCEDURE — 99223 1ST HOSP IP/OBS HIGH 75: CPT | Performed by: INTERNAL MEDICINE

## 2021-06-19 PROCEDURE — 6370000000 HC RX 637 (ALT 250 FOR IP): Performed by: FAMILY MEDICINE

## 2021-06-19 PROCEDURE — 6360000002 HC RX W HCPCS: Performed by: FAMILY MEDICINE

## 2021-06-19 PROCEDURE — 2700000000 HC OXYGEN THERAPY PER DAY

## 2021-06-19 PROCEDURE — 36415 COLL VENOUS BLD VENIPUNCTURE: CPT

## 2021-06-19 PROCEDURE — 83735 ASSAY OF MAGNESIUM: CPT

## 2021-06-19 PROCEDURE — 1200000000 HC SEMI PRIVATE

## 2021-06-19 PROCEDURE — 99233 SBSQ HOSP IP/OBS HIGH 50: CPT | Performed by: FAMILY MEDICINE

## 2021-06-19 PROCEDURE — 2580000003 HC RX 258: Performed by: FAMILY MEDICINE

## 2021-06-19 PROCEDURE — 80053 COMPREHEN METABOLIC PANEL: CPT

## 2021-06-19 PROCEDURE — 94640 AIRWAY INHALATION TREATMENT: CPT

## 2021-06-19 PROCEDURE — 99233 SBSQ HOSP IP/OBS HIGH 50: CPT | Performed by: INTERNAL MEDICINE

## 2021-06-19 RX ADMIN — Medication 10 ML: at 21:22

## 2021-06-19 RX ADMIN — Medication 10 ML: at 09:05

## 2021-06-19 RX ADMIN — NYSTATIN 500000 UNITS: 500000 SUSPENSION ORAL at 12:35

## 2021-06-19 RX ADMIN — IPRATROPIUM BROMIDE AND ALBUTEROL SULFATE 1 AMPULE: 2.5; .5 SOLUTION RESPIRATORY (INHALATION) at 15:56

## 2021-06-19 RX ADMIN — ENOXAPARIN SODIUM 40 MG: 40 INJECTION SUBCUTANEOUS at 17:05

## 2021-06-19 RX ADMIN — IPRATROPIUM BROMIDE AND ALBUTEROL SULFATE 1 AMPULE: 2.5; .5 SOLUTION RESPIRATORY (INHALATION) at 19:25

## 2021-06-19 RX ADMIN — NYSTATIN 500000 UNITS: 500000 SUSPENSION ORAL at 21:20

## 2021-06-19 RX ADMIN — IPRATROPIUM BROMIDE AND ALBUTEROL SULFATE 1 AMPULE: 2.5; .5 SOLUTION RESPIRATORY (INHALATION) at 12:08

## 2021-06-19 RX ADMIN — IPRATROPIUM BROMIDE AND ALBUTEROL SULFATE 1 AMPULE: 2.5; .5 SOLUTION RESPIRATORY (INHALATION) at 08:25

## 2021-06-19 RX ADMIN — NYSTATIN 500000 UNITS: 500000 SUSPENSION ORAL at 17:05

## 2021-06-19 RX ADMIN — FAMOTIDINE 20 MG: 20 TABLET ORAL at 09:05

## 2021-06-19 RX ADMIN — FAMOTIDINE 20 MG: 20 TABLET ORAL at 21:20

## 2021-06-19 RX ADMIN — NYSTATIN 500000 UNITS: 500000 SUSPENSION ORAL at 09:05

## 2021-06-19 ASSESSMENT — PAIN SCALES - GENERAL: PAINLEVEL_OUTOF10: 0

## 2021-06-19 NOTE — CONSULTS
Pulmonary [facility]                             Pulmonary Consult/Progress Note :          Patient: Jennifer Tim  MRN: [id number]  : 1960              Reason for [address] with acute COPD exacerbation   CC : SOB   HPI:   Jennifer Tim is a 62y.o. year old who smoke for 40 years and he smoke 1 pack daily ,40 PPY and now down to 1/2 pack daily   He was in ER last with SOB and he had CT chest and shows lung mass with effusion   He presented to the ED with worsening shortness of breath. He completed two chemotherapy treatments this week. Yesterday he felt worse with activity. He reports cough and phlegm production.   He was given a Medrol dose pack       He denies any further hemoptysis he denies any chest pain his shortness of breath is at baseline    He is using albuterol as needed    He was diagnosed with SCLC last month ,I also did thoracentesis and was negative and we had to biopsy the mass with EBUS     PAST MEDICAL HISTORY:     Past Medical History:   Diagnosis Date    Alcohol abuse     Cancer ([facility] [address]     Lung    Lung mass     right    Seasonal allergies     Tobacco abuse        PAST SURGICAL HISTORY:   Past Surgical History:   Procedure Laterality Date    BRONCHOSCOPY N/A 6/3/2021    BRONCHOSCOPY W/EBUS FNA performed by Dayan Maynard MD at [address] [place] [address] N/A 6/3/2021    BRONCHOSCOPY performed by Dayan May[facility]ard [facility] at [facility] 2021    RIGHT THORACENTESIS ULTRASOUND performed by Dayan Maynard MD at [address] [place] [address]:   Family History   Problem Relation Age of Onset    No Known Problems Mother     Alzheimer's Disease Father     Heart Attack Paternal Grandmother        SOCIAL HISTORY:   Social History     Socioeconomic History    Marital status:      Spouse name: Not on file    Number of children: Not on file    Years of education: Not on file    Highest education level: Not on file   Occupational History    Not on file   Tobacco Use    Smoking status: Current Every Day Smoker     Packs/day: 0.50     Start date: 6/18/1975    Smokeless tobacco: Never Used    Tobacco comment: was 1 pack daily   Vaping Use    Vaping Use: Never used   Substance and Sexual Activity    Alcohol use: Not Currently     Alcohol/week: 25.0 standard drinks     Types: 25 Cans of beer per week     Comment: not currently    Drug use: Yes     Types: Marijuana     Comment: daily    Sexual activity: Not Currently   Other Topics Concern    Not on file   Social History Narrative    Not on file     Social Determinants of Health     Financial Resource Strain:     Difficulty of Paying Living Expenses:    Food Insecurity:     Worried About Running Out of Food in the Last Year:     Ran Out of Food in the Last Year:    Transportation Needs:     Lack of Transportation (Medical):      Lack of Transportation (Non-Medical):    Physical Activity:     Days of Exercise per Week:     Minutes of Exercise per Session:    Stress:     Feeling of Stress :    Social Connections:     Frequency of Communication with Friends and Family:     Frequency of Social Gatherings with Friends and Family:     Attends Anabaptism Services:     Active Member of Clubs or Organizations:     Attends Club or Organization Meetings:     Marital Status:    Intimate Partner Violence:     Fear of Current or Ex-Partner:     Emotionally Abused:     Physically Abused:     Sexually Abused:      Social History     Tobacco Use   Smoking Status Current Every Day Smoker    Packs/day: 0.50    Start date: 6/18/1975   Smokeless Tobacco Never Used   Tobacco Comment    was 1 pack daily     Social History     Substance and Sexual Activity   Alcohol Use Not Currently    Alcohol/week: 25.0 standard drinks    Types: 25 Cans of beer per week    Comment: not currently     Social History Substance and Sexual Activity   Drug Use Yes    Types: Marijuana    Comment: daily           HOME MEDICATIONS:  Prior to Admission medications    Medication Sig Start Date End Date Taking?  Authorizing Provider   methylPREDNISolone (MEDROL DOSEPACK) 4 MG tablet Take 4 mg by mouth See Admin Instructions 6/5/4/3/2/1 6/14/21 6/19/21 Yes Historical Provider, MD   acetaminophen (TYLENOL) 500 MG tablet Take 500 mg by mouth every 6 hours as needed for Pain   Yes Historical Provider, MD   ondansetron (ZOFRAN-ODT) 4 MG disintegrating tablet Take 1 tablet by mouth every 8 hours as needed for Nausea or Vomiting 6/14/21  Yes Hanna Infante MD       CURRENT MEDICATIONS:  Current Facility-Administered Medications: sodium chloride flush 0.9 % injection 5-40 mL, 5-40 mL, Intravenous, 2 times per day  sodium chloride flush 0.9 % injection 5-40 mL, 5-40 mL, Intravenous, PRN  0.9 % sodium chloride infusion, 25 mL, Intravenous, PRN  enoxaparin (LOVENOX) injection 40 mg, 40 mg, Subcutaneous, Daily  ondansetron (ZOFRAN-ODT) disintegrating tablet 4 mg, 4 mg, Oral, Q8H PRN **OR** ondansetron (ZOFRAN) injection 4 mg, 4 mg, Intravenous, Q6H PRN  polyethylene glycol (GLYCOLAX) packet 17 g, 17 g, Oral, Daily PRN  acetaminophen (TYLENOL) tablet 650 mg, 650 mg, Oral, Q6H PRN **OR** acetaminophen (TYLENOL) suppository 650 mg, 650 mg, Rectal, Q6H PRN  famotidine (PEPCID) tablet 20 mg, 20 mg, Oral, BID  ipratropium-albuterol (DUONEB) nebulizer solution 1 ampule, 1 ampule, Inhalation, Q4H WA  nystatin (MYCOSTATIN) 606907 UNIT/ML suspension 500,000 Units, 5 mL, Oral, 4x Daily  nicotine (NICODERM CQ) 21 MG/24HR 1 patch, 1 patch, Transdermal, Daily    IV MEDICATIONS:      ALLERGIES:  No Known Allergies    REVIEW OF SYSTEMS:  General ROS:  No weight loss ,no fatigue     ENT ROS:   No Sore throat ,no lymphoadenopathy,no nasal stuffiness     Hematological and Lymphatic ROS:   No ecchymosis ,no tendency to bleed  Respiratory ROS:   Shortness of breath  Cardiovascular ROS:   No CP,No Palpitation   Gastrointestinal ROS:   No Gi bleed,no nausea or vomiting      - Musculoskeletal ROS:      - no joint swelling ,no joint pain   Neurological ROS:     -no weakness or numbness    Dermatological ROS:   No skin rash ,no urticaria     PHYSICAL EXAMINATION:     VITAL SIGNS:  /69   Pulse 84   Temp 97.6 °F (36.4 °C) (Oral)   Resp 16   Ht 5' 7\" (1.702 m)   Wt 106 lb (48.1 kg)   SpO2 97%   BMI 16.60 kg/m²   Wt Readings from Last 3 Encounters:   06/18/21 106 lb (48.1 kg)   06/16/21 104 lb 11.2 oz (47.5 kg)   06/14/21 106 lb 3.2 oz (48.2 kg)     Temp Readings from Last 3 Encounters:   06/19/21 97.6 °F (36.4 °C) (Oral)   06/17/21 98.2 °F (36.8 °C) (Temporal)   06/16/21 97.9 °F (36.6 °C)     TMAX:  BP Readings from Last 3 Encounters:   06/19/21 105/69   06/17/21 105/66   06/16/21 99/66     Pulse Readings from Last 3 Encounters:   06/19/21 84   06/17/21 75   06/16/21 80           INTAKE/OUTPUTS:  No intake/output data recorded.     Intake/Output Summary (Last 24 hours) at 6/19/2021 1509  Last data filed at 6/19/2021 0825  Gross per 24 hour   Intake 0 ml   Output --   Net 0 ml       General Appearance: alert and oriented to person, place and time, well-developed and   well-nourished, in no acute distress   Eyes: pupils equal, round, and reactive to light, extraocular eye movements intact, conjunctivae normal and sclera anicteric   Neck: neck supple and non tender without mass, no thyromegaly, no thyroid nodules and no cervical adenopathy   Pulmonary/Chest: Rhonchi bilaterally  Cardiovascular: normal rate, regular rhythm, normal S1 and S2, no murmurs, rubs, clicks or gallops, distal pulses intact, no carotid bruits, no murmurs, no gallops, no carotid bruits and no JVD   Abdomen: obese, soft, non-tender, non-distended, normal bowel sounds, no masses or organomegaly   Extremities: No edema or cyanosis  Musculoskeletal: normal range of motion, no joint swelling, deformity or tenderness   Neurologic: reflexes normal and symmetric, no cranial nerve deficit noted    LABS/IMAGING:    CBC:  Lab Results   Component Value Date    WBC 8.0 06/19/2021    HGB 12.4 (L) 06/19/2021    HCT 40.6 06/19/2021    MCV 92.9 06/19/2021     06/19/2021    LYMPHOPCT 5.2 (L) 06/19/2021    RBC 4.37 06/19/2021    MCH 28.4 06/19/2021    MCHC 30.5 (L) 06/19/2021    RDW 14.5 06/19/2021    NEUTOPHILPCT 93.0 (H) 06/19/2021    MONOPCT 0.9 (L) 06/19/2021    BASOPCT 0.0 06/19/2021    NEUTROABS 7.44 (H) 06/19/2021    LYMPHSABS 0.40 (L) 06/19/2021    MONOSABS 0.08 (L) 06/19/2021    EOSABS 0.07 06/19/2021    BASOSABS 0.00 06/19/2021       Recent Labs     06/19/21  0320 06/18/21  0743 06/14/21  1330   WBC 8.0 10.5 7.6   HGB 12.4* 12.8 13.2   HCT 40.6 39.2 40.5   MCV 92.9 90.1 88.4    258 270       BMP:   Recent Labs     06/18/21  0743 06/19/21  0320    138   K 3.4* 3.2*    99   CO2 29 32*   BUN 17 14   CREATININE 0.5* 0.5*       MG:   Lab Results   Component Value Date    MG 2.3 06/19/2021     Ca/Phos:   Lab Results   Component Value Date    CALCIUM 8.9 06/19/2021     Amylase: No results found for: AMYLASE  Lipase:   Lab Results   Component Value Date    LIPASE 12 (L) 05/07/2021     LIVER PROFILE:   Recent Labs     06/18/21  0743 06/19/21  0320   AST 19 17   ALT 11 10   BILITOT 0.4 0.5   ALKPHOS 69 71       PT/INR: No results for input(s): PROTIME, INR in the last 72 hours. APTT: No results for input(s): APTT in the last 72 hours.     Cardiac Enzymes:  Lab Results   Component Value Date    TROPONINI <0.01 05/07/2021                   PROBLEM LIST:  Patient Active Problem List   Diagnosis    Acute non-recurrent maxillary sinusitis    [facility] cell lung [facility] ([facility] [address]    Acute on chronic respiratory failure with hypoxia (HCC)               ASSESSMENT:  Acute COPD exacerbation   Possible post obstructive Pneumonia   Subcarinal mass/lymph node  Pleural effusion   Severe emphysema   COPD exacerbation       PLAN:    *-IV steroids   *-IV abx   *-check viral panel . COVID negative   *- Sputum culture   *_If in distress ,will use CPAP/BiPAP  *- procalcitonin  *-BD  ICS   Strep pneum  legionella  Incentive spirmetery and flutter valve   Watch IVF   CT chest reviewed   I doubt drain the fluid will help much as I just drained few weeks ago ,but if not better will consider drain  Check o2 at ambulation before discharge    *-I spent 4-6 minutes counseling patient regarding smoking and the risk of Lung cancer and COPD and respiratory failure           Thank you very much for allowing me to participate in the care of this pleasant patient , should you have any questions ,please do not hesitate to contact me      Franco Morris MD,[facility]  Pulmonary&Critical Care Medicine   Director of [address] [place] [address] Director of [address] [place] [address]    Nixon [email or web address]    NOTE: This report was transcribed using voice recognition software. Every effort was made to ensure accuracy; however, inadvertent computerized transcription errors may be present.

## 2021-06-19 NOTE — PROGRESS NOTES
[facility] Progress Note    Admitting Date and Time: 6/18/2021  6:54 AM  Admit Dx: Acute on chronic respiratory failure with hypoxia (HCC) [J96.21]    Subjective:  Patient is being followed for Acute on chronic respiratory failure with hypoxia ([address] [J96.21]     Pt feels slightly better. Per RN: Pulmonology planning on thoracentesis Monday; endobronchial ultrasound to follow on Monday    ROS: denies fever, chills, cp, sob, n/v, HA unless stated above.  sodium chloride flush  5-40 mL Intravenous 2 times per day    enoxaparin  40 mg Subcutaneous Daily    famotidine  20 mg Oral BID    ipratropium-albuterol  1 ampule Inhalation Q4H WA    nystatin  5 mL Oral 4x Daily    nicotine  1 patch Transdermal Daily     sodium chloride flush, 5-40 mL, PRN  sodium chloride, 25 mL, PRN  ondansetron, 4 mg, Q8H PRN   Or  ondansetron, 4 mg, Q6H PRN  polyethylene glycol, 17 g, Daily PRN  acetaminophen, 650 mg, Q6H PRN   Or  acetaminophen, 650 mg, Q6H PRN         Objective:    /69   Pulse 84   Temp 97.6 °F (36.4 °C) (Oral)   Resp 16   Ht 5' 7\" (1.702 m)   Wt 106 lb (48.1 kg)   SpO2 97%   BMI 16.60 kg/m²     General Appearance: alert and oriented to person, place and time and in no acute distress; thin/cachectic.   Coarse voice  Skin: warm and dry  Head: normocephalic and atraumatic  Eyes: pupils equal, round, and reactive to light, extraocular eye movements intact, conjunctivae normal  Neck: neck supple and non tender without mass   Pulmonary/Chest: clear to auscultation bilaterally  Cardiovascular: normal rate, normal S1 and S2 and no carotid bruits  Abdomen: soft, non-tender, non-distended, normal bowel sounds, no masses or organomegaly  Extremities: no cyanosis, no clubbing and no edema  Neurologic: no cranial nerve deficit and speech normal        Recent Labs     06/18/21  0743 06/19/21  0320    138   K 3.4* 3.2*    99   CO2 29 32*   BUN 17 14   CREATININE 0.5* 0.5*   GLUCOSE 92 88   CALCIUM 9.2 8.9       Recent Labs     06/18/21  0743 06/19/21  0320   WBC 10.5 8.0   RBC 4.35 4.37   HGB 12.8 12.4*   HCT 39.2 40.6   MCV 90.1 92.9   MCH 29.4 28.4   MCHC 32.7 30.5*   RDW 14.4 14.5    226   MPV 8.0 8.0       Radiology:  XR CHEST PORTABLE    Result Date: 6/18/2021  EXAMINATION: ONE XRAY VIEW OF THE CHEST 6/18/2021 7:45 am COMPARISON: 06/03/2021 HISTORY: ORDERING SYSTEM PROVIDED HISTORY: fever TECHNOLOGIST PROVIDED HISTORY: Reason for exam:->fever FINDINGS: Significant right lower lobe infiltrate and moderate right effusion and pleural thickening are not significantly changed. There are areas of parenchymal destruction laterally in the right hemithorax. There is some interstitial scarring in the left lung. There is an area of parenchymal destruction of the left upper lobe laterally. Heart size is mildly enlarged. No significant change     CTA CHEST W CONTRAST    Result Date: 6/18/2021  EXAMINATION: CTA OF THE CHEST 6/18/2021 9:31 am TECHNIQUE: CTA of the chest was performed after the administration of intravenous contrast.  Multiplanar reformatted images are provided for review. MIP images are provided for review. Dose modulation, iterative reconstruction, and/or weight based adjustment of the mA/kV was utilized to reduce the radiation dose to as low as reasonably achievable. COMPARISON: Comparison is made with the patient's previous CT of the chest of 06/11/2021 and previous PET-CT scan of 1 day earlier. HISTORY: ORDERING SYSTEM PROVIDED HISTORY: evaluate for PE, history of lung cancer TECHNOLOGIST PROVIDED HISTORY: Reason for exam:->evaluate for PE, history of lung cancer Decision Support Exception - unselect if not a suspected or confirmed emergency medical condition->Emergency Medical Condition (MA) FINDINGS: Pulmonary Arteries: Pulmonary arteries are adequately opacified for evaluation. No evidence of intraluminal filling defect to suggest pulmonary embolism.   Main pulmonary artery is normal in caliber. Mediastinum: Extensive right hilar,, mediastinal and subcarinal lymphadenopathy is identified. There is adenopathy seen within the right supraclavicular region. The lymphadenopathy was better defined on the patient's PET scan of 2 days earlier. The esophagus is dilated and filled with fluid. Lungs/pleura: There is a large mass seen within the right hilum with extension into the mediastinum and subcarinal region. This finding is consistent with the history of metastatic small cell lung CA. Advanced emphysematous changes are noted. There is a moderate sized right pleural effusion. There is an infiltrate superimposed on the emphysematous changes within the left lower lobe and to a lesser extent within the lingula. Upper Abdomen: Limited images of the upper abdomen are unremarkable. Soft Tissues/Bones: There are no expansile destructive lesion seen within the thoracic spine or ribs. In review with the prior PET scan metastatic lesions were not appreciated. 1. There is no evidence of a pulmonary embolus. 2. Moderate size right pleural effusion 3. Advanced emphysematous changes with superimposed pneumonia seen within the left lower lobe and within the lingula. This finding is new when compared with the previous CT scan of 06/11/2021 and PET-CT scan of 06/16/2021. 4. Large right perihilar mass with extension into the mediastinum consistent with the history of metastatic small cell lung CA 5. Significant right hilar, mediastinal core right paratracheal and supra and infraclavicular lymphadenopathy. 6. Pleural metastasis. Chronically distended esophagus secondary to the metastatic lymphadenopathy within the inferior posterior mediastinum. Assessment:    Active Problems:    Acute on chronic respiratory failure with hypoxia (HCC)  Resolved Problems:    * No resolved hospital problems. *      Plan:  1.     Acute on Chronic Respiratory Failure with Hypoxia -  Continue respiratory support with oxygen and nebs. Pulmonary and Oncology consults. 2.  Small Cell Lung Cancer - Oncology consult. Await recommendations. 3.  Tobacco Dependence - Nicoderm 21 mg patch daily. 4.  Hoarseness - nystatin suspension qid. NOTE: This report was transcribed using voice recognition software. Every effort was made to ensure accuracy; however, inadvertent computerized transcription errors may be present.     Electronically signed by Juliette Scales MD on 6/19/2021 at 1:47 PM

## 2021-06-19 NOTE — PLAN OF CARE
Problem: Falls - Risk of:  Goal: Will remain free from falls  Description: Will remain free from falls  6/19/2021 1732 by Estrada Duran RN  Outcome: Met This Shift  6/19/2021 0340 by Ana Nicole RN  Outcome: Met This Shift  6/19/2021 0340 by Ana Nicole RN  Outcome: Met This Shift  Goal: Absence of physical injury  Description: Absence of physical injury  6/19/2021 1732 by Estrada Duran RN  Outcome: Met This Shift  6/19/2021 0340 by Ana Nicole RN  Outcome: Met This Shift  6/19/2021 0340 by Ana Nicole RN  Outcome: Met This Shift     Problem: Pain:  Goal: Pain level will decrease  Description: Pain level will decrease  6/19/2021 1732 by Estrada Duran RN  Outcome: Met This Shift  6/19/2021 0340 by Ana Nicole RN  Outcome: Met This Shift  6/19/2021 0340 by Ana Nicole RN  Outcome: Met This Shift  Goal: Control of acute pain  Description: Control of acute pain  6/19/2021 1732 by Estrada Duran RN  Outcome: Met This Shift  6/19/2021 0340 by Ana Nicole RN  Outcome: Met This Shift  6/19/2021 0340 by Ana Nicole RN  Outcome: Met This Shift  Goal: Control of chronic pain  Description: Control of chronic pain  6/19/2021 1732 by Estrada Duran RN  Outcome: Met This Shift  6/19/2021 0340 by Ana Nicole RN  Outcome: Met This Shift  6/19/2021 0340 by Ana Nicole RN  Outcome: Met This Shift     Problem: FLUID AND ELECTROLYTE IMBALANCE  Goal: Fluid and electrolyte balance are achieved/maintained  6/19/2021 1732 by Estrada Duran RN  Outcome: Met This Shift  6/19/2021 0340 by Ana Nicole RN  Outcome: Met This Shift  6/19/2021 0340 by Ana Nicole RN  Outcome: Met This Shift     Problem: Gas Exchange - Impaired  Goal: Able to breathe comfortably  Description: Able to breathe comfortably  6/19/2021 1732 by Estrada Duran RN  Outcome: Met This Shift  6/19/2021 0340 by Ana Nicole, RN  Outcome: Met This Shift  6/19/2021 0340 by Ana Nicole, RN  Outcome: Ongoing
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
## 2021-06-19 NOTE — PLAN OF CARE
Problem: Falls - Risk of:  Goal: Will remain free from falls  6/19/2021 0340 by Etta Peña RN  Outcome: Met This Shift  6/19/2021 0340 by Etta Peña RN  Outcome: Met This Shift  6/18/2021 1708 by Martina Joyner RN  Outcome: Met This Shift  Goal: Absence of physical injury  6/19/2021 0340 by Etta Peña RN  Outcome: Met This Shift  6/19/2021 0340 by Etta Peña RN  Outcome: Met This Shift     Problem: Pain:  Goal: Pain level will decrease  6/19/2021 0340 by Etta Peña RN  Outcome: Met This Shift  6/19/2021 0340 by Etta Peña RN  Outcome: Met This Shift  6/18/2021 1708 by Martina Joyner RN  Outcome: Met This Shift  Goal: Control of acute pain  6/19/2021 0340 by Etta Peña RN  Outcome: Met This Shift  6/19/2021 0340 by Etta Peña RN  Outcome: Met This Shift  Goal: Control of chronic pain  6/19/2021 0340 by Etta Peña RN  Outcome: Met This Shift  6/19/2021 0340 by Etta Peña RN  Outcome: Met This Shift     Problem: FLUID AND ELECTROLYTE IMBALANCE  Goal: Fluid and electrolyte balance are achieved/maintained  6/19/2021 0340 by Etta Peña RN  Outcome: Met This Shift  6/19/2021 0340 by Etta Peña RN  Outcome: Met This Shift     Problem: Gas Exchange - Impaired  Goal: Able to breathe comfortably  6/19/2021 0340 by Etta Peña RN  Outcome: Met This Shift  6/19/2021 0340 by Etta Peña RN  Outcome: Ongoing  6/18/2021 1708 by Martina Joyner RN  Outcome: Ongoing

## 2021-06-20 LAB
ADENOVIRUS BY PCR: NOT DETECTED
ALBUMIN SERPL-MCNC: 3.5 G/DL (ref 3.5–5.2)
ALP BLD-CCNC: 85 U/L (ref 40–129)
ALT SERPL-CCNC: 8 U/L (ref 0–40)
ANION GAP SERPL CALCULATED.3IONS-SCNC: 6 MMOL/L (ref 7–16)
AST SERPL-CCNC: 20 U/L (ref 0–39)
BASOPHILS ABSOLUTE: 0 E9/L (ref 0–0.2)
BASOPHILS RELATIVE PERCENT: 0 % (ref 0–2)
BILIRUB SERPL-MCNC: 0.5 MG/DL (ref 0–1.2)
BORDETELLA PARAPERTUSSIS BY PCR: NOT DETECTED
BORDETELLA PERTUSSIS BY PCR: NOT DETECTED
BUN BLDV-MCNC: 14 MG/DL (ref 6–20)
C-REACTIVE PROTEIN: 16.7 MG/DL (ref 0–0.4)
CALCIUM SERPL-MCNC: 9.4 MG/DL (ref 8.6–10.2)
CHLAMYDOPHILIA PNEUMONIAE BY PCR: NOT DETECTED
CHLORIDE BLD-SCNC: 102 MMOL/L (ref 98–107)
CO2: 34 MMOL/L (ref 22–29)
CORONAVIRUS 229E BY PCR: NOT DETECTED
CORONAVIRUS HKU1 BY PCR: NOT DETECTED
CORONAVIRUS NL63 BY PCR: NOT DETECTED
CORONAVIRUS OC43 BY PCR: NOT DETECTED
CREAT SERPL-MCNC: 0.5 MG/DL (ref 0.7–1.2)
EOSINOPHILS ABSOLUTE: 0 E9/L (ref 0.05–0.5)
EOSINOPHILS RELATIVE PERCENT: 0 % (ref 0–6)
GFR AFRICAN AMERICAN: >60
GFR NON-AFRICAN AMERICAN: >60 ML/MIN/1.73
GLUCOSE BLD-MCNC: 110 MG/DL (ref 74–99)
HCT VFR BLD CALC: 39.7 % (ref 37–54)
HEMOGLOBIN: 12.4 G/DL (ref 12.5–16.5)
HUMAN METAPNEUMOVIRUS BY PCR: NOT DETECTED
HUMAN RHINOVIRUS/ENTEROVIRUS BY PCR: NOT DETECTED
HYPOCHROMIA: ABNORMAL
INFLUENZA A BY PCR: NOT DETECTED
INFLUENZA B BY PCR: NOT DETECTED
LYMPHOCYTES ABSOLUTE: 0.27 E9/L (ref 1.5–4)
LYMPHOCYTES RELATIVE PERCENT: 2.6 % (ref 20–42)
MCH RBC QN AUTO: 28.4 PG (ref 26–35)
MCHC RBC AUTO-ENTMCNC: 31.2 % (ref 32–34.5)
MCV RBC AUTO: 91.1 FL (ref 80–99.9)
MONOCYTES ABSOLUTE: 0 E9/L (ref 0.1–0.95)
MONOCYTES RELATIVE PERCENT: 0 % (ref 2–12)
MYCOPLASMA PNEUMONIAE BY PCR: NOT DETECTED
NEUTROPHILS ABSOLUTE: 8.63 E9/L (ref 1.8–7.3)
NEUTROPHILS RELATIVE PERCENT: 97.4 % (ref 43–80)
NUCLEATED RED BLOOD CELLS: 0 /100 WBC
OVALOCYTES: ABNORMAL
PARAINFLUENZA VIRUS 1 BY PCR: NOT DETECTED
PARAINFLUENZA VIRUS 2 BY PCR: NOT DETECTED
PARAINFLUENZA VIRUS 3 BY PCR: NOT DETECTED
PARAINFLUENZA VIRUS 4 BY PCR: NOT DETECTED
PDW BLD-RTO: 14.6 FL (ref 11.5–15)
PLATELET # BLD: 230 E9/L (ref 130–450)
PMV BLD AUTO: 8.2 FL (ref 7–12)
POLYCHROMASIA: ABNORMAL
POTASSIUM REFLEX MAGNESIUM: 3.6 MMOL/L (ref 3.5–5)
PROCALCITONIN: 0.13 NG/ML (ref 0–0.08)
RBC # BLD: 4.36 E12/L (ref 3.8–5.8)
RESPIRATORY SYNCYTIAL VIRUS BY PCR: NOT DETECTED
SARS-COV-2, PCR: NOT DETECTED
SODIUM BLD-SCNC: 142 MMOL/L (ref 132–146)
TOTAL PROTEIN: 7.4 G/DL (ref 6.4–8.3)
WBC # BLD: 8.9 E9/L (ref 4.5–11.5)

## 2021-06-20 PROCEDURE — 99233 SBSQ HOSP IP/OBS HIGH 50: CPT | Performed by: FAMILY MEDICINE

## 2021-06-20 PROCEDURE — 86140 C-REACTIVE PROTEIN: CPT

## 2021-06-20 PROCEDURE — 2580000003 HC RX 258: Performed by: FAMILY MEDICINE

## 2021-06-20 PROCEDURE — 36415 COLL VENOUS BLD VENIPUNCTURE: CPT

## 2021-06-20 PROCEDURE — 84145 PROCALCITONIN (PCT): CPT

## 2021-06-20 PROCEDURE — 6370000000 HC RX 637 (ALT 250 FOR IP): Performed by: FAMILY MEDICINE

## 2021-06-20 PROCEDURE — 6360000002 HC RX W HCPCS: Performed by: FAMILY MEDICINE

## 2021-06-20 PROCEDURE — 85025 COMPLETE CBC W/AUTO DIFF WBC: CPT

## 2021-06-20 PROCEDURE — 99233 SBSQ HOSP IP/OBS HIGH 50: CPT | Performed by: INTERNAL MEDICINE

## 2021-06-20 PROCEDURE — 2500000003 HC RX 250 WO HCPCS: Performed by: FAMILY MEDICINE

## 2021-06-20 PROCEDURE — 80053 COMPREHEN METABOLIC PANEL: CPT

## 2021-06-20 PROCEDURE — 1200000000 HC SEMI PRIVATE

## 2021-06-20 PROCEDURE — 6360000002 HC RX W HCPCS: Performed by: INTERNAL MEDICINE

## 2021-06-20 PROCEDURE — 94667 MNPJ CHEST WALL 1ST: CPT

## 2021-06-20 PROCEDURE — 0202U NFCT DS 22 TRGT SARS-COV-2: CPT

## 2021-06-20 PROCEDURE — 2700000000 HC OXYGEN THERAPY PER DAY

## 2021-06-20 PROCEDURE — 87449 NOS EACH ORGANISM AG IA: CPT

## 2021-06-20 PROCEDURE — 2580000003 HC RX 258: Performed by: INTERNAL MEDICINE

## 2021-06-20 PROCEDURE — 94640 AIRWAY INHALATION TREATMENT: CPT

## 2021-06-20 RX ORDER — DOXYCYCLINE HYCLATE 100 MG/1
100 CAPSULE ORAL EVERY 12 HOURS SCHEDULED
Status: DISCONTINUED | OUTPATIENT
Start: 2021-06-20 | End: 2021-06-20

## 2021-06-20 RX ORDER — PREDNISONE 20 MG/1
40 TABLET ORAL DAILY
Status: DISCONTINUED | OUTPATIENT
Start: 2021-06-20 | End: 2021-06-20

## 2021-06-20 RX ORDER — METHYLPREDNISOLONE SODIUM SUCCINATE 40 MG/ML
40 INJECTION, POWDER, LYOPHILIZED, FOR SOLUTION INTRAMUSCULAR; INTRAVENOUS EVERY 6 HOURS
Status: DISCONTINUED | OUTPATIENT
Start: 2021-06-20 | End: 2021-06-21

## 2021-06-20 RX ADMIN — Medication 10 ML: at 22:05

## 2021-06-20 RX ADMIN — FAMOTIDINE 20 MG: 20 TABLET ORAL at 08:59

## 2021-06-20 RX ADMIN — NYSTATIN 500000 UNITS: 500000 SUSPENSION ORAL at 13:52

## 2021-06-20 RX ADMIN — NYSTATIN 500000 UNITS: 500000 SUSPENSION ORAL at 08:59

## 2021-06-20 RX ADMIN — NYSTATIN 500000 UNITS: 500000 SUSPENSION ORAL at 16:41

## 2021-06-20 RX ADMIN — IPRATROPIUM BROMIDE AND ALBUTEROL SULFATE 1 AMPULE: 2.5; .5 SOLUTION RESPIRATORY (INHALATION) at 22:27

## 2021-06-20 RX ADMIN — ONDANSETRON 4 MG: 4 TABLET, ORALLY DISINTEGRATING ORAL at 08:59

## 2021-06-20 RX ADMIN — METHYLPREDNISOLONE SODIUM SUCCINATE 40 MG: 40 INJECTION, POWDER, LYOPHILIZED, FOR SOLUTION INTRAMUSCULAR; INTRAVENOUS at 10:53

## 2021-06-20 RX ADMIN — METHYLPREDNISOLONE SODIUM SUCCINATE 40 MG: 40 INJECTION, POWDER, LYOPHILIZED, FOR SOLUTION INTRAMUSCULAR; INTRAVENOUS at 22:04

## 2021-06-20 RX ADMIN — IPRATROPIUM BROMIDE AND ALBUTEROL SULFATE 1 AMPULE: 2.5; .5 SOLUTION RESPIRATORY (INHALATION) at 08:47

## 2021-06-20 RX ADMIN — Medication 10 ML: at 09:00

## 2021-06-20 RX ADMIN — FAMOTIDINE 20 MG: 20 TABLET ORAL at 22:04

## 2021-06-20 RX ADMIN — METHYLPREDNISOLONE SODIUM SUCCINATE 40 MG: 40 INJECTION, POWDER, LYOPHILIZED, FOR SOLUTION INTRAMUSCULAR; INTRAVENOUS at 17:55

## 2021-06-20 RX ADMIN — DOXYCYCLINE 100 MG: 100 INJECTION, POWDER, LYOPHILIZED, FOR SOLUTION INTRAVENOUS at 22:04

## 2021-06-20 RX ADMIN — IPRATROPIUM BROMIDE AND ALBUTEROL SULFATE 1 AMPULE: 2.5; .5 SOLUTION RESPIRATORY (INHALATION) at 17:09

## 2021-06-20 RX ADMIN — WATER 1000 MG: 1 INJECTION INTRAMUSCULAR; INTRAVENOUS; SUBCUTANEOUS at 10:53

## 2021-06-20 RX ADMIN — SODIUM CHLORIDE, PRESERVATIVE FREE 10 ML: 5 INJECTION INTRAVENOUS at 17:48

## 2021-06-20 RX ADMIN — ENOXAPARIN SODIUM 40 MG: 40 INJECTION SUBCUTANEOUS at 16:41

## 2021-06-20 RX ADMIN — IPRATROPIUM BROMIDE AND ALBUTEROL SULFATE 1 AMPULE: 2.5; .5 SOLUTION RESPIRATORY (INHALATION) at 12:40

## 2021-06-20 RX ADMIN — NYSTATIN 500000 UNITS: 500000 SUSPENSION ORAL at 22:04

## 2021-06-20 ASSESSMENT — PAIN SCALES - GENERAL
PAINLEVEL_OUTOF10: 0
PAINLEVEL_OUTOF10: 0

## 2021-06-20 NOTE — PLAN OF CARE
Problem: Falls - Risk of:  Goal: Will remain free from falls  Description: Will remain free from falls  Outcome: Met This Shift  Goal: Absence of physical injury  Description: Absence of physical injury  Outcome: Met This Shift     Problem: Pain:  Goal: Pain level will decrease  Description: Pain level will decrease  Outcome: Met This Shift  Goal: Control of acute pain  Description: Control of acute pain  Outcome: Met This Shift  Goal: Control of chronic pain  Description: Control of chronic pain  Outcome: Met This Shift     Problem: FLUID AND ELECTROLYTE IMBALANCE  Goal: Fluid and electrolyte balance are achieved/maintained  Outcome: Met This Shift     Problem: Gas Exchange - Impaired  Goal: Able to breathe comfortably  Description: Able to breathe comfortably  Outcome: Met This Shift

## 2021-06-20 NOTE — PROGRESS NOTES
Hematology/oncology inpatient follow-up:    Reason for Visit:  [address] [place] [address]    Referring Physician:  Franklin Carranza DO    PCP:  Andrey Daniels DO    Subjective: The patient is feeling better, the shortness of breath had improved. Physical Exam:  /73   Pulse 84   Temp 97.8 °F (36.6 °C) (Oral)   Resp 16   Ht 5' 7\" (1.702 m)   Wt 106 lb 4.2 oz (48.2 kg)   SpO2 96%   BMI 16.64 kg/m²   GENERAL: Alert, oriented x 3, not in acute distress. HEENT: PERRLA; EOMI. Oropharynx clear. NECK: Supple. Without lymphadenopathy. LUNGS: Decreased air entry in the right base. CARDIOVASCULAR: Regular rate. No murmurs, rubs or gallops. ABDOMEN: Soft. Non-tender, non-distended. Positive bowel sounds. EXTREMITIES: Without clubbing, cyanosis, or edema. NEUROLOGIC: No focal deficits. ECOG PS 1    Lab Results   Component Value Date    WBC 8.9 06/20/2021    HGB 12.4 (L) 06/20/2021    HCT 39.7 06/20/2021    MCV 91.1 06/20/2021     06/20/2021     Lab Results   Component Value Date     06/20/2021    K 3.6 06/20/2021     06/20/2021    CO2 34 (H) 06/20/2021    BUN 14 06/20/2021    CREATININE 0.5 (L) 06/20/2021    GLUCOSE 110 (H) 06/20/2021    CALCIUM 9.4 06/20/2021    PROT 7.4 06/20/2021    LABALBU 3.5 06/20/2021    BILITOT 0.5 06/20/2021    ALKPHOS 85 06/20/2021    AST 20 06/20/2021    ALT 8 06/20/2021    LABGLOM >60 06/20/2021    GFRAA >60 06/20/2021     Impression/Plan:  62 y.o male with Extensive SCLC    PET/CT on 06/15/2021 Mass centered in the right hilar region extending into the mediastinum consistent with history of small cell lung cancer with metabolically active metastatic mediastinal lymphadenopathy extending into the bilateral supraclavicular region and inferiorly into the retrocrural region. Metabolically active right pleural metastases with corresponding pleural effusion  Metabolically active nodules in the skin of the abdomen are concerning for metastatic disease.    The esophagus is distended which appears to be secondary to compression by lower mediastinal lymphadenopathy. Intense activity in the collapsed right lower lobe could be due to additional malignancy or postobstructive pneumonia.      Extensive SCLC, Recommended systemic therapy consisting of Carboplatin + Etoposide + Tecentriq. Side effects reviewed. He agreed to proceed. Cycle # 1 Carboplatin + Etoposide + Tecentriq was on 06/16/2021  He had presented to the hospital on 06/18/2021 for worsening shortness of breath. CTA chest noted no PE. Moderate right pleural effusion  Advanced emphysematous changes with superimposed pneumonia seen within the with the previous CT scan of 06/11/2021 and PET-CT scan of 06/16/2021  Large right perihilar mass with extension into the mediastinum  Significant right hilar mediastinal core right paratracheal and supra and infraclavicular LN  Pleural metastasis. Chronically distended esophagus   The patient was admitted with acute COPD exacerbation, possible postobstructive pneumonia  The patient is doing better clinically. -Pulmonary team is on board, will hold off on thoracentesis at this time.  -Continue IV steroids, nebs. -Continue antibiotics, the patient is on IV ceftriaxone and doxycycline  -Continue to monitor his CBCD. Will continue to follow. Thank you for allowing us to participate in the care of Mr.  Emily Alcantar MD   HEMATOLOGY/MEDICAL [address] [facility],  [address] [facility] MED ONCOLOGY  [facility] [address] [facility] [address]  Dept: [phone]

## 2021-06-20 NOTE — PROGRESS NOTES
[facility] Progress Note    Admitting Date and Time: 6/18/2021  6:54 AM  Admit Dx: Acute on chronic respiratory failure with hypoxia (HCC) [J96.21]    Subjective:  Patient is being followed for Acute on chronic respiratory failure with hypoxia ([address] [J96.21]     Pt feels spitting up a lot of mucus. Unable to keep much done. IV antibiotics and Steroids started by Pulmonary. Questions about pleural effusion. Per RN:  Changed oral Doxycycline to IV. ROS: denies fever, chills, cp, sob, n/v, HA unless stated above.  methylPREDNISolone  40 mg Intravenous Q6H    cefTRIAXone (ROCEPHIN) IV  1,000 mg Intravenous Q24H    doxycycline (VIBRAMYCIN) IV  100 mg Intravenous Q12H    sodium chloride flush  5-40 mL Intravenous 2 times per day    enoxaparin  40 mg Subcutaneous Daily    famotidine  20 mg Oral BID    ipratropium-albuterol  1 ampule Inhalation Q4H WA    nystatin  5 mL Oral 4x Daily    nicotine  1 patch Transdermal Daily     sodium chloride flush, 5-40 mL, PRN  sodium chloride, 25 mL, PRN  ondansetron, 4 mg, Q8H PRN   Or  ondansetron, 4 mg, Q6H PRN  polyethylene glycol, 17 g, Daily PRN  acetaminophen, 650 mg, Q6H PRN   Or  acetaminophen, 650 mg, Q6H PRN         Objective:    /73   Pulse 84   Temp 97.8 °F (36.6 °C) (Oral)   Resp 16   Ht 5' 7\" (1.702 m)   Wt 106 lb 4.2 oz (48.2 kg)   SpO2 96%   BMI 16.64 kg/m²     General Appearance: alert and oriented to person, place and time and with frequent coughing and spitting up mucus. Skin: warm and dry  Head: normocephalic and atraumatic  Eyes: pupils equal, round, and reactive to light, extraocular eye movements intact, conjunctivae normal  Neck: neck supple and non tender without mass   Pulmonary/Chest: unchanged.   No retractions noted  Cardiovascular: normal rate, normal S1 and S2 and no carotid bruits  Abdomen: soft, non-tender, non-distended, normal bowel sounds, no masses or organomegaly  Extremities: no cyanosis, no clubbing and no edema  Neurologic: no cranial nerve deficit and speech normal        Recent Labs     06/18/21  0743 06/19/21  0320 06/20/21  0705    138 142   K 3.4* 3.2* 3.6    99 102   CO2 29 32* 34*   BUN 17 14 14   CREATININE 0.5* 0.5* 0.5*   GLUCOSE 92 88 110*   CALCIUM 9.2 8.9 9.4       Recent Labs     06/18/21  0743 06/19/21  0320 06/20/21  0705   WBC 10.5 8.0 8.9   RBC 4.35 4.37 4.36   HGB 12.8 12.4* 12.4*   HCT 39.2 40.6 39.7   MCV 90.1 92.9 91.1   MCH 29.4 28.4 28.4   MCHC 32.7 30.5* 31.2*   RDW 14.4 14.5 14.6    226 230   MPV 8.0 8.0 8.2       Radiology:   No results found. Assessment:    Active Problems:    Acute on chronic respiratory failure with hypoxia (HCC)  Resolved Problems:    * No resolved hospital problems. *      Plan:  1. Acute on Chronic Respiratory Failure with Hypoxia -  Continue respiratory support with oxygen and nebs.  Pulmonary and Oncology consults. 2.  Small Cell Lung Cancer - Oncology consult.  Await recommendations. 3.  Tobacco Dependence - Nicoderm 21 mg patch daily. 4.  Hoarseness - nystatin suspension qid. NOTE: This report was transcribed using voice recognition software. Every effort was made to ensure accuracy; however, inadvertent computerized transcription errors may be present.     Electronically signed by Garrett Thurston MD on 6/20/2021 at 10:33 AM

## 2021-06-20 NOTE — PLAN OF CARE
Problem: Falls - Risk of:  Goal: Will remain free from falls  6/19/2021 2229 by Jayesh Tong RN  Outcome: Met This Shift  6/19/2021 1732 by Jarad Driver RN  Outcome: Met This Shift  Goal: Absence of physical injury  6/19/2021 2229 by Jayesh Tong RN  Outcome: Met This Shift  6/19/2021 1732 by Jarad Driver RN  Outcome: Met This Shift     Problem: Pain:  Goal: Pain level will decrease  6/19/2021 2229 by Jayesh Tong RN  Outcome: Met This Shift  6/19/2021 1732 by Jarad Driver RN  Outcome: Met This Shift  Goal: Control of acute pain  6/19/2021 2229 by Jayesh Tong RN  Outcome: Met This Shift  6/19/2021 1732 by Jarad Driver RN  Outcome: Met This Shift  Goal: Control of chronic pain  6/19/2021 2229 by Jayesh Tong RN  Outcome: Met This Shift  6/19/2021 1732 by Jarad Driver RN  Outcome: Met This Shift     Problem: Gas Exchange - Impaired  Goal: Able to breathe comfortably  6/19/2021 2229 by Jayesh Tong RN  Outcome: Met This Shift  6/19/2021 1732 by Jarad Driver RN  Outcome: Met This Shift

## 2021-06-21 ENCOUNTER — APPOINTMENT (OUTPATIENT)
Dept: GENERAL RADIOLOGY | Age: 61
DRG: 951 | End: 2021-06-21
Payer: COMMERCIAL

## 2021-06-21 LAB
ALBUMIN SERPL-MCNC: 3.4 G/DL (ref 3.5–5.2)
ALP BLD-CCNC: 72 U/L (ref 40–129)
ALT SERPL-CCNC: 8 U/L (ref 0–40)
ANION GAP SERPL CALCULATED.3IONS-SCNC: 8 MMOL/L (ref 7–16)
AST SERPL-CCNC: 16 U/L (ref 0–39)
BASOPHILS ABSOLUTE: 0 E9/L (ref 0–0.2)
BASOPHILS RELATIVE PERCENT: 0 % (ref 0–2)
BILIRUB SERPL-MCNC: 0.5 MG/DL (ref 0–1.2)
BUN BLDV-MCNC: 13 MG/DL (ref 6–20)
CALCIUM SERPL-MCNC: 9.4 MG/DL (ref 8.6–10.2)
CHLORIDE BLD-SCNC: 98 MMOL/L (ref 98–107)
CO2: 33 MMOL/L (ref 22–29)
CREAT SERPL-MCNC: 0.4 MG/DL (ref 0.7–1.2)
EOSINOPHILS ABSOLUTE: 0 E9/L (ref 0.05–0.5)
EOSINOPHILS RELATIVE PERCENT: 0 % (ref 0–6)
GFR AFRICAN AMERICAN: >60
GFR NON-AFRICAN AMERICAN: >60 ML/MIN/1.73
GLUCOSE BLD-MCNC: 125 MG/DL (ref 74–99)
HCT VFR BLD CALC: 37.9 % (ref 37–54)
HEMOGLOBIN: 12.2 G/DL (ref 12.5–16.5)
L. PNEUMOPHILA SEROGP 1 UR AG: NORMAL
LYMPHOCYTES ABSOLUTE: 0.14 E9/L (ref 1.5–4)
LYMPHOCYTES RELATIVE PERCENT: 2 % (ref 20–42)
MAGNESIUM: 2.3 MG/DL (ref 1.6–2.6)
MCH RBC QN AUTO: 29.4 PG (ref 26–35)
MCHC RBC AUTO-ENTMCNC: 32.2 % (ref 32–34.5)
MCV RBC AUTO: 91.3 FL (ref 80–99.9)
MONOCYTES ABSOLUTE: 0.07 E9/L (ref 0.1–0.95)
MONOCYTES RELATIVE PERCENT: 1 % (ref 2–12)
NEUTROPHILS ABSOLUTE: 6.79 E9/L (ref 1.8–7.3)
NEUTROPHILS RELATIVE PERCENT: 97 % (ref 43–80)
PDW BLD-RTO: 14.7 FL (ref 11.5–15)
PLATELET # BLD: 212 E9/L (ref 130–450)
PMV BLD AUTO: 8.2 FL (ref 7–12)
POTASSIUM REFLEX MAGNESIUM: 3.5 MMOL/L (ref 3.5–5)
RBC # BLD: 4.15 E12/L (ref 3.8–5.8)
RBC # BLD: NORMAL 10*6/UL
SODIUM BLD-SCNC: 139 MMOL/L (ref 132–146)
STREP PNEUMONIAE ANTIGEN, URINE: NORMAL
TOTAL PROTEIN: 7.3 G/DL (ref 6.4–8.3)
WBC # BLD: 7 E9/L (ref 4.5–11.5)

## 2021-06-21 PROCEDURE — 2580000003 HC RX 258: Performed by: FAMILY MEDICINE

## 2021-06-21 PROCEDURE — 83735 ASSAY OF MAGNESIUM: CPT

## 2021-06-21 PROCEDURE — 36415 COLL VENOUS BLD VENIPUNCTURE: CPT

## 2021-06-21 PROCEDURE — 6370000000 HC RX 637 (ALT 250 FOR IP): Performed by: FAMILY MEDICINE

## 2021-06-21 PROCEDURE — 1200000000 HC SEMI PRIVATE

## 2021-06-21 PROCEDURE — 2500000003 HC RX 250 WO HCPCS: Performed by: FAMILY MEDICINE

## 2021-06-21 PROCEDURE — 71045 X-RAY EXAM CHEST 1 VIEW: CPT

## 2021-06-21 PROCEDURE — 94640 AIRWAY INHALATION TREATMENT: CPT

## 2021-06-21 PROCEDURE — 94669 MECHANICAL CHEST WALL OSCILL: CPT

## 2021-06-21 PROCEDURE — 99233 SBSQ HOSP IP/OBS HIGH 50: CPT | Performed by: INTERNAL MEDICINE

## 2021-06-21 PROCEDURE — 6360000002 HC RX W HCPCS: Performed by: STUDENT IN AN ORGANIZED HEALTH CARE EDUCATION/TRAINING PROGRAM

## 2021-06-21 PROCEDURE — 6360000002 HC RX W HCPCS: Performed by: INTERNAL MEDICINE

## 2021-06-21 PROCEDURE — 80053 COMPREHEN METABOLIC PANEL: CPT

## 2021-06-21 PROCEDURE — 99222 1ST HOSP IP/OBS MODERATE 55: CPT | Performed by: SURGERY

## 2021-06-21 PROCEDURE — 94667 MNPJ CHEST WALL 1ST: CPT

## 2021-06-21 PROCEDURE — 2580000003 HC RX 258: Performed by: INTERNAL MEDICINE

## 2021-06-21 PROCEDURE — 2700000000 HC OXYGEN THERAPY PER DAY

## 2021-06-21 PROCEDURE — 85025 COMPLETE CBC W/AUTO DIFF WBC: CPT

## 2021-06-21 PROCEDURE — 99232 SBSQ HOSP IP/OBS MODERATE 35: CPT | Performed by: STUDENT IN AN ORGANIZED HEALTH CARE EDUCATION/TRAINING PROGRAM

## 2021-06-21 RX ORDER — METHYLPREDNISOLONE SODIUM SUCCINATE 40 MG/ML
40 INJECTION, POWDER, LYOPHILIZED, FOR SOLUTION INTRAMUSCULAR; INTRAVENOUS EVERY 8 HOURS
Status: DISCONTINUED | OUTPATIENT
Start: 2021-06-21 | End: 2021-06-23

## 2021-06-21 RX ORDER — GUAIFENESIN 400 MG/1
400 TABLET ORAL 4 TIMES DAILY PRN
Status: DISCONTINUED | OUTPATIENT
Start: 2021-06-21 | End: 2021-07-02 | Stop reason: HOSPADM

## 2021-06-21 RX ADMIN — FAMOTIDINE 20 MG: 20 TABLET ORAL at 07:56

## 2021-06-21 RX ADMIN — IPRATROPIUM BROMIDE AND ALBUTEROL SULFATE 1 AMPULE: 2.5; .5 SOLUTION RESPIRATORY (INHALATION) at 07:42

## 2021-06-21 RX ADMIN — SODIUM CHLORIDE, PRESERVATIVE FREE 10 ML: 5 INJECTION INTRAVENOUS at 10:34

## 2021-06-21 RX ADMIN — DOXYCYCLINE 100 MG: 100 INJECTION, POWDER, LYOPHILIZED, FOR SOLUTION INTRAVENOUS at 22:46

## 2021-06-21 RX ADMIN — Medication 10 ML: at 07:57

## 2021-06-21 RX ADMIN — IPRATROPIUM BROMIDE AND ALBUTEROL SULFATE 1 AMPULE: 2.5; .5 SOLUTION RESPIRATORY (INHALATION) at 12:08

## 2021-06-21 RX ADMIN — Medication 10 ML: at 21:05

## 2021-06-21 RX ADMIN — DOXYCYCLINE 100 MG: 100 INJECTION, POWDER, LYOPHILIZED, FOR SOLUTION INTRAVENOUS at 11:37

## 2021-06-21 RX ADMIN — METHYLPREDNISOLONE SODIUM SUCCINATE 40 MG: 40 INJECTION, POWDER, LYOPHILIZED, FOR SOLUTION INTRAMUSCULAR; INTRAVENOUS at 10:34

## 2021-06-21 RX ADMIN — NYSTATIN 500000 UNITS: 500000 SUSPENSION ORAL at 21:04

## 2021-06-21 RX ADMIN — WATER 1000 MG: 1 INJECTION INTRAMUSCULAR; INTRAVENOUS; SUBCUTANEOUS at 10:34

## 2021-06-21 RX ADMIN — METHYLPREDNISOLONE SODIUM SUCCINATE 40 MG: 40 INJECTION, POWDER, LYOPHILIZED, FOR SOLUTION INTRAMUSCULAR; INTRAVENOUS at 21:04

## 2021-06-21 RX ADMIN — METHYLPREDNISOLONE SODIUM SUCCINATE 40 MG: 40 INJECTION, POWDER, LYOPHILIZED, FOR SOLUTION INTRAMUSCULAR; INTRAVENOUS at 05:38

## 2021-06-21 RX ADMIN — NYSTATIN 500000 UNITS: 500000 SUSPENSION ORAL at 17:14

## 2021-06-21 RX ADMIN — IPRATROPIUM BROMIDE AND ALBUTEROL SULFATE 1 AMPULE: 2.5; .5 SOLUTION RESPIRATORY (INHALATION) at 20:09

## 2021-06-21 RX ADMIN — IPRATROPIUM BROMIDE AND ALBUTEROL SULFATE 1 AMPULE: 2.5; .5 SOLUTION RESPIRATORY (INHALATION) at 15:41

## 2021-06-21 RX ADMIN — NYSTATIN 500000 UNITS: 500000 SUSPENSION ORAL at 12:44

## 2021-06-21 RX ADMIN — NYSTATIN 500000 UNITS: 500000 SUSPENSION ORAL at 07:57

## 2021-06-21 RX ADMIN — FAMOTIDINE 20 MG: 20 TABLET ORAL at 21:04

## 2021-06-21 ASSESSMENT — PAIN SCALES - GENERAL
PAINLEVEL_OUTOF10: 0
PAINLEVEL_OUTOF10: 0

## 2021-06-21 NOTE — CONSULTS
tablet by mouth every 8 hours as needed for Nausea or Vomiting 6/14/21  Yes Simone Osborne MD       No Known Allergies    Family History   Problem Relation Age of Onset    No Known Problems Mother     Alzheimer's Disease Father     Heart Attack Paternal Grandmother        Social History     Tobacco Use    Smoking status: Current Every Day Smoker     Packs/day: 0.50     Start date: 6/18/1975    Smokeless tobacco: Never Used    Tobacco comment: was 1 pack daily   Vaping Use    Vaping Use: Never used   Substance Use Topics    Alcohol use: Not Currently     Alcohol/week: 25.0 standard drinks     Types: 25 Cans of beer per week     Comment: not currently    Drug use: Yes     Types: Marijuana     Comment: daily         Review of Systems:   General ROS: negative  Hematological and Lymphatic ROS: negative  Respiratory ROS: SCLC, on 2L O2  Cardiovascular ROS: no chest pain or dyspnea on exertion  Gastrointestinal ROS: no abdominal pain, change in bowel habits, or black or bloody stools  Genito-Urinary ROS: no dysuria, trouble voiding, or hematuria  Musculoskeletal ROS: negative      PHYSICAL EXAM:    Vitals:    06/21/21 0745   BP: 110/66   Pulse: 94   Resp: 18   Temp: 98.2 °F (36.8 °C)   SpO2: 95%       GENERAL:  NAD. A&Ox3. HEAD:  Normocephalic. Atraumatic. EYES:   No scleral icterus. PERRL. LUNGS:  No increased work of breathing. On 2L O2  CARDIOVASCULAR: Regular rate  ABDOMEN:  Soft, non-distended, non-tender. No guarding, rigidity, rebound. EXTREMITIES:   MAEx4. Atraumatic. No LE edema.   SKIN:  Warm and dry  NEUROLOGIC:  No focal deficits    LABS:    CBC  Recent Labs     06/21/21  0620   WBC 7.0   HGB 12.2*   HCT 37.9        BMP  Recent Labs     06/21/21  0620      K 3.5   CL 98   CO2 33*   BUN 13   CREATININE 0.4*   CALCIUM 9.4     Liver Function  Recent Labs     06/21/21  0620   BILITOT 0.5   AST 16   ALT 8   ALKPHOS 72   PROT 7.3   LABALBU 3.4*     No results for input(s): LACTATE in the last 72 hours. No results for input(s): INR, PTT in the last 72 hours. Invalid input(s): PT    RADIOLOGY    XR CHEST PORTABLE    Result Date: 6/18/2021  EXAMINATION: ONE XRAY VIEW OF THE CHEST 6/18/2021 7:45 am COMPARISON: 06/03/2021 HISTORY: ORDERING SYSTEM PROVIDED HISTORY: fever TECHNOLOGIST PROVIDED HISTORY: Reason for exam:->fever FINDINGS: Significant right lower lobe infiltrate and moderate right effusion and pleural thickening are not significantly changed. There are areas of parenchymal destruction laterally in the right hemithorax. There is some interstitial scarring in the left lung. There is an area of parenchymal destruction of the left upper lobe laterally. Heart size is mildly enlarged. No significant change     CTA CHEST W CONTRAST    Result Date: 6/18/2021  EXAMINATION: CTA OF THE CHEST 6/18/2021 9:31 am TECHNIQUE: CTA of the chest was performed after the administration of intravenous contrast.  Multiplanar reformatted images are provided for review. MIP images are provided for review. Dose modulation, iterative reconstruction, and/or weight based adjustment of the mA/kV was utilized to reduce the radiation dose to as low as reasonably achievable. COMPARISON: Comparison is made with the patient's previous CT of the chest of 06/11/2021 and previous PET-CT scan of 1 day earlier. HISTORY: ORDERING SYSTEM PROVIDED HISTORY: evaluate for PE, history of lung cancer TECHNOLOGIST PROVIDED HISTORY: Reason for exam:->evaluate for PE, history of lung cancer Decision Support Exception - unselect if not a suspected or confirmed emergency medical condition->Emergency Medical Condition (MA) FINDINGS: Pulmonary Arteries: Pulmonary arteries are adequately opacified for evaluation. No evidence of intraluminal filling defect to suggest pulmonary embolism. Main pulmonary artery is normal in caliber. Mediastinum: Extensive right hilar,, mediastinal and subcarinal lymphadenopathy is identified.   There is adenopathy seen within the right supraclavicular region. The lymphadenopathy was better defined on the patient's PET scan of 2 days earlier. The esophagus is dilated and filled with fluid. Lungs/pleura: There is a large mass seen within the right hilum with extension into the mediastinum and subcarinal region. This finding is consistent with the history of metastatic small cell lung CA. Advanced emphysematous changes are noted. There is a moderate sized right pleural effusion. There is an infiltrate superimposed on the emphysematous changes within the left lower lobe and to a lesser extent within the lingula. Upper Abdomen: Limited images of the upper abdomen are unremarkable. Soft Tissues/Bones: There are no expansile destructive lesion seen within the thoracic spine or ribs. In review with the prior PET scan metastatic lesions were not appreciated. 1. There is no evidence of a pulmonary embolus. 2. Moderate size right pleural effusion 3. Advanced emphysematous changes with superimposed pneumonia seen within the left lower lobe and within the lingula. This finding is new when compared with the previous CT scan of 06/11/2021 and PET-CT scan of 06/16/2021. 4. Large right perihilar mass with extension into the mediastinum consistent with the history of metastatic small cell lung CA 5. Significant right hilar, mediastinal core right paratracheal and supra and infraclavicular lymphadenopathy. 6. Pleural metastasis. Chronically distended esophagus secondary to the metastatic lymphadenopathy within the inferior posterior mediastinum. ASSESSMENT/PLAN:  62 y.o. male with SCLC needing chemotherapy; Dysphagia likely 2/2 mediastinal LAD    On schedule for mediport tomorrow which was scheduled as outpatient  Stable from pulmonar standpoint, O2 requirements  Repeat CXR  Anesthesia to see  Still plan on mediport tomorrow.   NPO after MN  Will request IR to do thoracentesis after mediport  Will need dysphagia workup, may need PEG, timing TBD. If due to lymphadenopathy then needs chemo to help decrease size of lymph nodes      The risks, benefits, alternatives, and potential complications of the procedure, including the risks of bleeding, infection, and injury to surrounding structures, were explained to the patient. All questions were answered. The patient understands and agrees to proceed with the procedure. Plan discussed with Dr. Hamilton Gleason. Hanna Wood DO  Resident, PGY-2  6/21/2021  3:42 PM    I saw and examined the patient. I reviewed the above resident's note. I agree with the assessment and plan as outlined.     Hamilton Gleason MD  General Surgery

## 2021-06-21 NOTE — CARE COORDINATION
Social Work / Discharge Planning : SW met with patient and explained role as discharge planner / Transition of care. Patient verified plan at discharge is HOME where he resides independently alone. Patient has his vehicle in parking lot. Patient admitted with acute respiratory failure and currently  on  2 liters of 02 and this is NEW. If needed at discharge, he does NOT have DME preference and SW made referral to Facundo Rincon at [facility] DME to follow. . Goal to ween. Patient denies hx of HHC/SNF. patient does receive treatment for his cancer. Patient PCP is DR Prince Connors and he [employer]. SW to follow.  Electronically signed by ZANDRA Correia on 6/21/21 at 11:04 AM EDT

## 2021-06-21 NOTE — PROGRESS NOTES
Inpatient Medical Oncology Progress Note    Subjective:  No fever. SOB improved. Objective:  /66   Pulse 94   Temp 98.2 °F (36.8 °C) (Oral)   Resp 18   Ht 5' 7\" (1.702 m)   Wt 105 lb 14.4 oz (48 kg)   SpO2 95%   BMI 16.59 kg/m²   GENERAL: Alert, oriented x 3, not in acute distress. HEENT: PERRLA; EOMI. Oropharynx clear. NECK: Supple. Without lymphadenopathy. LUNGS: Decreased air entry in the right base. CARDIOVASCULAR: Regular rate. No murmurs, rubs or gallops. ABDOMEN: Soft. Non-tender, non-distended. Positive bowel sounds. EXTREMITIES: Without clubbing, cyanosis, or edema. NEUROLOGIC: No focal deficits.    ECOG PS 1    Diagnostics:  Lab Results   Component Value Date    WBC 7.0 06/21/2021    HGB 12.2 (L) 06/21/2021    HCT 37.9 06/21/2021    MCV 91.3 06/21/2021     06/21/2021     Lab Results   Component Value Date     06/21/2021    K 3.5 06/21/2021    CL 98 06/21/2021    CO2 33 (H) 06/21/2021    BUN 13 06/21/2021    CREATININE 0.4 (L) 06/21/2021    GLUCOSE 125 (H) 06/21/2021    CALCIUM 9.4 06/21/2021    PROT 7.3 06/21/2021    LABALBU 3.4 (L) 06/21/2021    BILITOT 0.5 06/21/2021    ALKPHOS 72 06/21/2021    AST 16 06/21/2021    ALT 8 06/21/2021    LABGLOM >60 06/21/2021    GFRAA >60 06/21/2021     Impression/Plan:  62 y.o male with Extensive SCLC     PET/CT on 06/15/2021 Mass centered in the right hilar region extending into the mediastinum consistent with history of small cell lung cancer with metabolically active metastatic mediastinal lymphadenopathy extending into the bilateral supraclavicular region and inferiorly into the retrocrural region.   Metabolically active right pleural metastases with corresponding pleural effusion  Metabolically active nodules in the skin of the abdomen are concerning for metastatic disease.   The esophagus is distended which appears to be secondary to compression by lower mediastinal lymphadenopathy.   Intense activity in the collapsed right lower lobe could be due to additional malignancy or postobstructive pneumonia.      Extensive SCLC, Recommended systemic therapy consisting of Carboplatin + Etoposide + Tecentriq. Side effects reviewed. He agreed to proceed.   Cycle # 1 Carboplatin + Etoposide + Tecentriq was on 06/16/2021  He had presented to the hospital on 06/18/2021 for worsening shortness of breath. CTA chest noted no PE. Moderate right pleural effusion  Advanced emphysematous changes with superimposed pneumonia seen within the with the previous CT scan of 06/11/2021 and PET-CT scan of 06/16/2021  Large right perihilar mass with extension into the mediastinum  Significant right hilar mediastinal core right paratracheal and supra and infraclavicular LN  Pleural metastasis. Chronically distended esophagus   The patient was admitted with acute COPD exacerbation, possible postobstructive pneumonia  The patient is doing better clinically. -Pulmonary team on board, IR guided thoracentesis. Vest treatments to facilitate bronchopulmonary clearance  -Continue IV steroids, nebs. -Continue antibiotics, on IV ceftriaxone and doxycycline  -Continue to monitor his CBCD. [facility] tomorrow 06/22/2021  Will continue to follow.     06/21/2021  Froy Delacruz MD

## 2021-06-21 NOTE — PLAN OF CARE
Problem: Falls - Risk of:  Goal: Will remain free from falls  6/21/2021 0509 by Anitha Larsen RN  Outcome: Met This Shift  6/20/2021 1832 by Valerie Wise RN  Outcome: Met This Shift  Goal: Absence of physical injury  6/21/2021 0509 by Anitha Larsen RN  Outcome: Met This Shift  6/20/2021 1832 by Valerie Wise RN  Outcome: Met This Shift     Problem: Pain:  Goal: Pain level will decrease  6/21/2021 0509 by Anitha Larsen RN  Outcome: Met This Shift  6/20/2021 1832 by Valerie Wise RN  Outcome: Met This Shift  Goal: Control of acute pain  6/21/2021 0509 by Anitha Larsen RN  Outcome: Met This Shift  6/20/2021 1832 by Valerie Wise RN  Outcome: Met This Shift  Goal: Control of chronic pain  6/21/2021 0509 by Anitha Larsen RN  Outcome: Met This Shift  6/20/2021 1832 by Valerie Wise RN  Outcome: Met This Shift     Problem: FLUID AND ELECTROLYTE IMBALANCE  Goal: Fluid and electrolyte balance are achieved/maintained  6/21/2021 0509 by Anitha Larsen RN  Outcome: Met This Shift  6/20/2021 1832 by Valerie Wise RN  Outcome: Met This Shift     Problem: Gas Exchange - Impaired  Goal: Able to breathe comfortably  6/21/2021 0509 by Anitha Larsen RN  Outcome: Met This Shift  6/20/2021 1832 by Valerie Wise RN  Outcome: Met This Shift

## 2021-06-21 NOTE — PROGRESS NOTES
Pulmonary Progress Note    Admit Date: 2021  Hospital day                               PCP: Florencia Ayon DO    Chief Complaint (s):  Patient Active Problem List   Diagnosis    Acute non-recurrent maxillary sinusitis    Small cell lung cancer ([address]    Acute on chronic respiratory failure with hypoxia (HCC)       Subjective:  · Awake and alert. Cannot speak much. Notes that anything he ingests comes right back up and that he is losing weight. He had an appointment with Dr. Subha Zarco today. As an inpatient, he missed it. Vitals:  VITALS:  /66   Pulse 94   Temp 98.2 °F (36.8 °C) (Oral)   Resp 18   Ht 5' 7\" (1.702 m)   Wt 105 lb 14.4 oz (48 kg)   SpO2 95%   BMI 16.59 kg/m²     24HR INTAKE/OUTPUT:      Intake/Output Summary (Last 24 hours) at 2021 1452  Last data filed at 2021 0449  Gross per 24 hour   Intake 100 ml   Output --   Net 100 ml       24HR PULSE OXIMETRY RANGE:    SpO2  Av.8 %  Min: 95 %  Max: 96 %    Medications:  IV:   sodium chloride         Scheduled Meds:   methylPREDNISolone  40 mg Intravenous Q8H    cefTRIAXone (ROCEPHIN) IV  1,000 mg Intravenous Q24H    doxycycline (VIBRAMYCIN) IV  100 mg Intravenous Q12H    sodium chloride flush  5-40 mL Intravenous 2 times per day    enoxaparin  40 mg Subcutaneous Daily    famotidine  20 mg Oral BID    ipratropium-albuterol  1 ampule Inhalation Q4H WA    nystatin  5 mL Oral 4x Daily    nicotine  1 patch Transdermal Daily       Diet:   ADULT DIET; Regular  Diet NPO Exceptions are: Sips of Water with Meds     EXAM:  General: No distress. Alert. Eyes: PERRL. No sclera icterus. No conjunctival injection. ENT: No discharge. Pharynx clear. Neck: Trachea midline. Normal thyroid. Resp: No accessory muscle use. LLL rales. No wheezing. No rhonchi. Absent at the right base. CV: Regular rate. Regular rhythm. No murmur or rub. Abd: Non-tender. Non-distended. No masses. No organomegaly. Normal bowel sounds. Skin: Warm and dry. No nodule on exposed extremities. No rash on exposed extremities. Ext: No cyanosis, clubbing, edema  Lymph: No cervical LAD. No supraclavicular LAD. M/S: No cyanosis. No joint deformity. Significant clubbing. Neuro: Awake. Follows commands. Positive pupils/gag/corneals. Normal pain response. Results:  CBC:   Recent Labs     06/19/21 0320 06/20/21 0705 06/21/21 0620   WBC 8.0 8.9 7.0   HGB 12.4* 12.4* 12.2*   HCT 40.6 39.7 37.9   MCV 92.9 91.1 91.3    230 212     BMP:   Recent Labs     06/19/21 0320 06/20/21 0705 06/21/21 0620    142 139   K 3.2* 3.6 3.5   CL 99 102 98   CO2 32* 34* 33*   BUN 14 14 13   CREATININE 0.5* 0.5* 0.4*     LIVER PROFILE:   Recent Labs     06/19/21 0320 06/20/21 0705 06/21/21 0620   AST 17 20 16   ALT 10 8 8   BILITOT 0.5 0.5 0.5   ALKPHOS 71 85 72     PT/INR: No results for input(s): PROTIME, INR in the last 72 hours. APTT: No results for input(s): APTT in the last 72 hours. Pathology:  1. N/A      Microbiology:  1. None    Recent ABG:   No results for input(s): PH, PO2, PCO2, HCO3, BE, O2SAT, METHB, O2HB, COHB, O2CON, HHB, THB in the last 72 hours. Recent Films:  CTA CHEST W CONTRAST   Final Result   1. There is no evidence of a pulmonary embolus. 2. Moderate size right pleural effusion   3. Advanced emphysematous changes with superimposed pneumonia seen within the   left lower lobe and within the lingula. This finding is new when compared   with the previous CT scan of 06/11/2021 and PET-CT scan of 06/16/2021.   4. Large right perihilar mass with extension into the mediastinum consistent   with the history of metastatic small cell lung CA   5. Significant right hilar, mediastinal core right paratracheal and supra and   infraclavicular lymphadenopathy. 6. Pleural metastasis. Chronically distended esophagus secondary to the   metastatic lymphadenopathy within the inferior posterior mediastinum. XR CHEST PORTABLE   Final Result   No significant change         XR CHEST PORTABLE    (Results Pending)       Assessment:  1. Large right pleural effusion: Likely malignant  2. Aspiration pneumonia, left side  3. Esophageal occlusion: Secondary to likely malignant mediastinal adenopathy with external compression as seen above    Plan:  1. Surgery consult to replace the patient's outpatient evaluation  2. Thoracentesis  3. Vest treatments to facilitate bronchopulmonary clearance  4. Would keep n.p.o. for now    Time at the bedside, reviewing labs and radiographs, reviewing updated notes and consultations, discussing with staff and family was more than 45 minutes. Please note that voice recognition technology was used in the preparation of this note and make therefore it may contain inadvertent transcription errors. If the patient is a COVID 19 isolation patient, the above physical exam reflects that of the examining physician for the day. Ayala Patel MD,  M.SWATI., F.C.C.P.     [facility] [address], [address] [place] [address]

## 2021-06-21 NOTE — PATIENT CARE CONFERENCE
[facility] Quality Flow/Interdisciplinary Rounds Progress Note        Quality Flow Rounds held on June 21, 2021    Disciplines Attending:  Bedside Nurse, ,  and Nursing Unit Leadership    Calixto Henson was admitted on 6/18/2021  6:54 AM    Anticipated Discharge Date:  Expected Discharge Date: 06/21/21    Disposition:    Anand Score:  Anand Scale Score: 20    Readmission Risk              Risk of Unplanned Readmission:  13           Discussed patient goal for the day, patient clinical progression, and barriers to discharge.   The following Goal(s) of the Day/Commitment(s) have been identified:  Wean O2  and steroids      Bob Talavera RN  June 21, 2021

## 2021-06-22 ENCOUNTER — APPOINTMENT (OUTPATIENT)
Dept: GENERAL RADIOLOGY | Age: 61
DRG: 951 | End: 2021-06-22
Payer: COMMERCIAL

## 2021-06-22 ENCOUNTER — APPOINTMENT (OUTPATIENT)
Dept: ULTRASOUND IMAGING | Age: 61
DRG: 951 | End: 2021-06-22
Payer: COMMERCIAL

## 2021-06-22 ENCOUNTER — ANESTHESIA (OUTPATIENT)
Dept: OPERATING ROOM | Age: 61
DRG: 951 | End: 2021-06-22
Payer: COMMERCIAL

## 2021-06-22 ENCOUNTER — ANESTHESIA EVENT (OUTPATIENT)
Dept: OPERATING ROOM | Age: 61
DRG: 951 | End: 2021-06-22
Payer: COMMERCIAL

## 2021-06-22 VITALS — DIASTOLIC BLOOD PRESSURE: 57 MMHG | SYSTOLIC BLOOD PRESSURE: 92 MMHG | OXYGEN SATURATION: 100 %

## 2021-06-22 LAB
ABO/RH: NORMAL
ANTIBODY SCREEN: NORMAL
INR BLD: 1.2
PROTHROMBIN TIME: 13.5 SEC (ref 9.3–12.4)

## 2021-06-22 PROCEDURE — 2709999900 US THORACENTESIS

## 2021-06-22 PROCEDURE — C1788 PORT, INDWELLING, IMP: HCPCS | Performed by: SURGERY

## 2021-06-22 PROCEDURE — 6360000002 HC RX W HCPCS: Performed by: SURGERY

## 2021-06-22 PROCEDURE — 0W993ZZ DRAINAGE OF RIGHT PLEURAL CAVITY, PERCUTANEOUS APPROACH: ICD-10-PCS | Performed by: STUDENT IN AN ORGANIZED HEALTH CARE EDUCATION/TRAINING PROGRAM

## 2021-06-22 PROCEDURE — 36561 INSERT TUNNELED CV CATH: CPT | Performed by: SURGERY

## 2021-06-22 PROCEDURE — B5161ZA FLUOROSCOPY OF RIGHT SUBCLAVIAN VEIN USING LOW OSMOLAR CONTRAST, GUIDANCE: ICD-10-PCS | Performed by: SURGERY

## 2021-06-22 PROCEDURE — 71045 X-RAY EXAM CHEST 1 VIEW: CPT

## 2021-06-22 PROCEDURE — 3700000001 HC ADD 15 MINUTES (ANESTHESIA): Performed by: SURGERY

## 2021-06-22 PROCEDURE — 6370000000 HC RX 637 (ALT 250 FOR IP): Performed by: FAMILY MEDICINE

## 2021-06-22 PROCEDURE — 2500000003 HC RX 250 WO HCPCS: Performed by: FAMILY MEDICINE

## 2021-06-22 PROCEDURE — 7100000010 HC PHASE II RECOVERY - FIRST 15 MIN: Performed by: SURGERY

## 2021-06-22 PROCEDURE — 1200000000 HC SEMI PRIVATE

## 2021-06-22 PROCEDURE — 86901 BLOOD TYPING SEROLOGIC RH(D): CPT

## 2021-06-22 PROCEDURE — 2580000003 HC RX 258: Performed by: ANESTHESIOLOGY

## 2021-06-22 PROCEDURE — 7100000011 HC PHASE II RECOVERY - ADDTL 15 MIN: Performed by: SURGERY

## 2021-06-22 PROCEDURE — 36415 COLL VENOUS BLD VENIPUNCTURE: CPT

## 2021-06-22 PROCEDURE — 2500000003 HC RX 250 WO HCPCS: Performed by: SURGERY

## 2021-06-22 PROCEDURE — 99233 SBSQ HOSP IP/OBS HIGH 50: CPT | Performed by: STUDENT IN AN ORGANIZED HEALTH CARE EDUCATION/TRAINING PROGRAM

## 2021-06-22 PROCEDURE — 6360000002 HC RX W HCPCS: Performed by: ANESTHESIOLOGY

## 2021-06-22 PROCEDURE — 94668 MNPJ CHEST WALL SBSQ: CPT

## 2021-06-22 PROCEDURE — 2700000000 HC OXYGEN THERAPY PER DAY

## 2021-06-22 PROCEDURE — 86850 RBC ANTIBODY SCREEN: CPT

## 2021-06-22 PROCEDURE — 3600000002 HC SURGERY LEVEL 2 BASE: Performed by: SURGERY

## 2021-06-22 PROCEDURE — 99233 SBSQ HOSP IP/OBS HIGH 50: CPT | Performed by: INTERNAL MEDICINE

## 2021-06-22 PROCEDURE — 05H533Z INSERTION OF INFUSION DEVICE INTO RIGHT SUBCLAVIAN VEIN, PERCUTANEOUS APPROACH: ICD-10-PCS | Performed by: SURGERY

## 2021-06-22 PROCEDURE — 6360000002 HC RX W HCPCS: Performed by: INTERNAL MEDICINE

## 2021-06-22 PROCEDURE — 85610 PROTHROMBIN TIME: CPT

## 2021-06-22 PROCEDURE — 2500000003 HC RX 250 WO HCPCS: Performed by: ANESTHESIOLOGY

## 2021-06-22 PROCEDURE — 2580000003 HC RX 258: Performed by: FAMILY MEDICINE

## 2021-06-22 PROCEDURE — 2580000003 HC RX 258: Performed by: INTERNAL MEDICINE

## 2021-06-22 PROCEDURE — 94640 AIRWAY INHALATION TREATMENT: CPT

## 2021-06-22 PROCEDURE — 3209999900 FLUORO FOR SURGICAL PROCEDURES

## 2021-06-22 PROCEDURE — 6360000002 HC RX W HCPCS: Performed by: STUDENT IN AN ORGANIZED HEALTH CARE EDUCATION/TRAINING PROGRAM

## 2021-06-22 PROCEDURE — 6370000000 HC RX 637 (ALT 250 FOR IP): Performed by: STUDENT IN AN ORGANIZED HEALTH CARE EDUCATION/TRAINING PROGRAM

## 2021-06-22 PROCEDURE — 77001 FLUOROGUIDE FOR VEIN DEVICE: CPT | Performed by: SURGERY

## 2021-06-22 PROCEDURE — 3600000012 HC SURGERY LEVEL 2 ADDTL 15MIN: Performed by: SURGERY

## 2021-06-22 PROCEDURE — 2709999900 HC NON-CHARGEABLE SUPPLY: Performed by: SURGERY

## 2021-06-22 PROCEDURE — 86900 BLOOD TYPING SEROLOGIC ABO: CPT

## 2021-06-22 PROCEDURE — 3700000000 HC ANESTHESIA ATTENDED CARE: Performed by: SURGERY

## 2021-06-22 DEVICE — PORT SMARTPORT 8FR CT TI W/VLV SHTH: Type: IMPLANTABLE DEVICE | Site: SUBCLAVIAN | Status: FUNCTIONAL

## 2021-06-22 RX ORDER — PROMETHAZINE HYDROCHLORIDE 25 MG/ML
6.25 INJECTION, SOLUTION INTRAMUSCULAR; INTRAVENOUS
Status: DISCONTINUED | OUTPATIENT
Start: 2021-06-22 | End: 2021-06-22 | Stop reason: HOSPADM

## 2021-06-22 RX ORDER — HYDRALAZINE HYDROCHLORIDE 20 MG/ML
5 INJECTION INTRAMUSCULAR; INTRAVENOUS EVERY 10 MIN PRN
Status: DISCONTINUED | OUTPATIENT
Start: 2021-06-22 | End: 2021-06-22 | Stop reason: HOSPADM

## 2021-06-22 RX ORDER — LABETALOL HYDROCHLORIDE 5 MG/ML
5 INJECTION, SOLUTION INTRAVENOUS EVERY 10 MIN PRN
Status: DISCONTINUED | OUTPATIENT
Start: 2021-06-22 | End: 2021-06-22 | Stop reason: HOSPADM

## 2021-06-22 RX ORDER — BUPIVACAINE HYDROCHLORIDE AND EPINEPHRINE 2.5; 5 MG/ML; UG/ML
INJECTION, SOLUTION EPIDURAL; INFILTRATION; INTRACAUDAL; PERINEURAL PRN
Status: DISCONTINUED | OUTPATIENT
Start: 2021-06-22 | End: 2021-06-22 | Stop reason: ALTCHOICE

## 2021-06-22 RX ORDER — FENTANYL CITRATE 50 UG/ML
INJECTION, SOLUTION INTRAMUSCULAR; INTRAVENOUS PRN
Status: DISCONTINUED | OUTPATIENT
Start: 2021-06-22 | End: 2021-06-22 | Stop reason: SDUPTHER

## 2021-06-22 RX ORDER — HEPARIN SODIUM (PORCINE) LOCK FLUSH IV SOLN 100 UNIT/ML 100 UNIT/ML
SOLUTION INTRAVENOUS PRN
Status: DISCONTINUED | OUTPATIENT
Start: 2021-06-22 | End: 2021-06-22 | Stop reason: ALTCHOICE

## 2021-06-22 RX ORDER — MEPERIDINE HYDROCHLORIDE 25 MG/ML
12.5 INJECTION INTRAMUSCULAR; INTRAVENOUS; SUBCUTANEOUS EVERY 5 MIN PRN
Status: DISCONTINUED | OUTPATIENT
Start: 2021-06-22 | End: 2021-06-22 | Stop reason: HOSPADM

## 2021-06-22 RX ORDER — PROPOFOL 10 MG/ML
INJECTION, EMULSION INTRAVENOUS CONTINUOUS PRN
Status: DISCONTINUED | OUTPATIENT
Start: 2021-06-22 | End: 2021-06-22 | Stop reason: SDUPTHER

## 2021-06-22 RX ORDER — SODIUM CHLORIDE 9 MG/ML
INJECTION, SOLUTION INTRAVENOUS CONTINUOUS PRN
Status: DISCONTINUED | OUTPATIENT
Start: 2021-06-22 | End: 2021-06-22 | Stop reason: SDUPTHER

## 2021-06-22 RX ORDER — LIDOCAINE HYDROCHLORIDE 20 MG/ML
INJECTION, SOLUTION EPIDURAL; INFILTRATION; INTRACAUDAL; PERINEURAL PRN
Status: DISCONTINUED | OUTPATIENT
Start: 2021-06-22 | End: 2021-06-22 | Stop reason: SDUPTHER

## 2021-06-22 RX ORDER — MIDAZOLAM HYDROCHLORIDE 1 MG/ML
INJECTION INTRAMUSCULAR; INTRAVENOUS PRN
Status: DISCONTINUED | OUTPATIENT
Start: 2021-06-22 | End: 2021-06-22 | Stop reason: SDUPTHER

## 2021-06-22 RX ADMIN — METHYLPREDNISOLONE SODIUM SUCCINATE 40 MG: 40 INJECTION, POWDER, LYOPHILIZED, FOR SOLUTION INTRAMUSCULAR; INTRAVENOUS at 05:00

## 2021-06-22 RX ADMIN — METHYLPREDNISOLONE SODIUM SUCCINATE 40 MG: 40 INJECTION, POWDER, LYOPHILIZED, FOR SOLUTION INTRAMUSCULAR; INTRAVENOUS at 13:59

## 2021-06-22 RX ADMIN — FENTANYL CITRATE 50 MCG: 50 INJECTION, SOLUTION INTRAMUSCULAR; INTRAVENOUS at 07:58

## 2021-06-22 RX ADMIN — LIDOCAINE HYDROCHLORIDE 100 MG: 20 INJECTION, SOLUTION EPIDURAL; INFILTRATION; INTRACAUDAL; PERINEURAL at 07:58

## 2021-06-22 RX ADMIN — DOXYCYCLINE 100 MG: 100 INJECTION, POWDER, LYOPHILIZED, FOR SOLUTION INTRAVENOUS at 21:43

## 2021-06-22 RX ADMIN — SODIUM CHLORIDE: 9 INJECTION, SOLUTION INTRAVENOUS at 07:56

## 2021-06-22 RX ADMIN — NYSTATIN 500000 UNITS: 500000 SUSPENSION ORAL at 17:52

## 2021-06-22 RX ADMIN — IPRATROPIUM BROMIDE AND ALBUTEROL SULFATE 1 AMPULE: 2.5; .5 SOLUTION RESPIRATORY (INHALATION) at 12:19

## 2021-06-22 RX ADMIN — PROPOFOL 200 MCG/KG/MIN: 10 INJECTION, EMULSION INTRAVENOUS at 08:06

## 2021-06-22 RX ADMIN — GUAIFENESIN 400 MG: 400 TABLET ORAL at 21:33

## 2021-06-22 RX ADMIN — IPRATROPIUM BROMIDE AND ALBUTEROL SULFATE 1 AMPULE: 2.5; .5 SOLUTION RESPIRATORY (INHALATION) at 19:26

## 2021-06-22 RX ADMIN — IPRATROPIUM BROMIDE AND ALBUTEROL SULFATE 1 AMPULE: 2.5; .5 SOLUTION RESPIRATORY (INHALATION) at 15:58

## 2021-06-22 RX ADMIN — MIDAZOLAM 2 MG: 1 INJECTION INTRAMUSCULAR; INTRAVENOUS at 07:58

## 2021-06-22 RX ADMIN — WATER 1000 MG: 1 INJECTION INTRAMUSCULAR; INTRAVENOUS; SUBCUTANEOUS at 11:28

## 2021-06-22 RX ADMIN — FENTANYL CITRATE 50 MCG: 50 INJECTION, SOLUTION INTRAMUSCULAR; INTRAVENOUS at 08:11

## 2021-06-22 RX ADMIN — FAMOTIDINE 20 MG: 20 TABLET ORAL at 21:32

## 2021-06-22 RX ADMIN — NYSTATIN 500000 UNITS: 500000 SUSPENSION ORAL at 21:33

## 2021-06-22 RX ADMIN — DOXYCYCLINE 100 MG: 100 INJECTION, POWDER, LYOPHILIZED, FOR SOLUTION INTRAVENOUS at 10:03

## 2021-06-22 RX ADMIN — METHYLPREDNISOLONE SODIUM SUCCINATE 40 MG: 40 INJECTION, POWDER, LYOPHILIZED, FOR SOLUTION INTRAMUSCULAR; INTRAVENOUS at 21:33

## 2021-06-22 RX ADMIN — NYSTATIN 500000 UNITS: 500000 SUSPENSION ORAL at 13:59

## 2021-06-22 RX ADMIN — Medication 10 ML: at 21:33

## 2021-06-22 RX ADMIN — PHENYLEPHRINE HYDROCHLORIDE 200 MCG: 10 INJECTION INTRAVENOUS at 08:20

## 2021-06-22 ASSESSMENT — PULMONARY FUNCTION TESTS
PIF_VALUE: 1
PIF_VALUE: 0
PIF_VALUE: 0
PIF_VALUE: 1
PIF_VALUE: 1
PIF_VALUE: 0
PIF_VALUE: 1
PIF_VALUE: 0
PIF_VALUE: 1

## 2021-06-22 ASSESSMENT — PAIN SCALES - GENERAL
PAINLEVEL_OUTOF10: 0

## 2021-06-22 ASSESSMENT — LIFESTYLE VARIABLES: SMOKING_STATUS: 1

## 2021-06-22 NOTE — PROGRESS NOTES
-- DO NOT REPLY / DO NOT REPLY ALL --  -- Message is from the [facility]--    PATIENT WANTS TO SET UP PROXY FOR THEIR CHILD IN [employer]    Team Member confirmed that patient has a [employer] Shira    Parent/guardian Name: Claudine Price  Parent/guardian : 1986    Child Name ( 1): Adrián Thrasher (10/25/2005)  Child Name ( 2): Solomon Arizmendi (2020)    Call Back number: [phone]   Event Note    · Patient was unavailable for exam.  Gopal Esquivel. Melquiades Aquino M.D., F.C.C.P.     [facility] [address] [facility], [address] [place] [address], [address] [place] [address]

## 2021-06-22 NOTE — PROGRESS NOTES
Spoke with Dr. Susi Aaron regarding patient request to be discharged. Dr. Susi Aaron does not feel patient is ready for discharge at this time.

## 2021-06-22 NOTE — ANESTHESIA POSTPROCEDURE EVALUATION
Department of Anesthesiology  Postprocedure Note    Patient: Aurora Phelps  MRN: [id number]  YOB: 1960  Date of evaluation: 6/22/2021  Time:  8:43 AM     Procedure Summary     Date: 06/22/21 Room / Location: [facility] 01 / 106 [facility]    Anesthesia Start: 6514 Anesthesia Stop: Criselda Cheema    Procedure: MEDI PORT INSERTION (N/A Chest) Diagnosis: (LUNG CANCER)    Surgeons: Travis Thompson MD Responsible Provider: Harry Clemens MD    Anesthesia Type: MAC ASA Status: 4          Anesthesia Type: MAC    Jeremy Phase I:      Jeremy Phase II:      Last vitals: Reviewed and per EMR flowsheets.        Anesthesia Post Evaluation    Patient location during evaluation: PACU  Patient participation: complete - patient participated  Level of consciousness: awake and alert  Airway patency: patent  Nausea & Vomiting: no nausea and no vomiting  Complications: no  Cardiovascular status: hemodynamically stable  Respiratory status: acceptable  Hydration status: euvolemic

## 2021-06-22 NOTE — PROGRESS NOTES
GENERAL SURGERY  DAILY PROGRESS NOTE  6/22/2021    Subjective: Increased O2 requirements overnight from 2L to 4L. Scheduled for mediport insertion today. Also for thoracentesis    Objective:  /77   Pulse 83   Temp 98.1 °F (36.7 °C) (Oral)   Resp 20   Ht 5' 7\" (1.702 m)   Wt 105 lb 9.6 oz (47.9 kg)   SpO2 94%   BMI 16.54 kg/m²     GENERAL:  Laying in bed, awake, alert, cooperative, no apparent distress. Thin and frail appearing  HEAD: Normocephalic, atraumatic  EYES: No sclera icterus, pupils equal  LUNGS:  No increased work of breathing. On 4L O2  CARDIOVASCULAR:  Regular rate  ABDOMEN:  Soft, non-tender, non-distended  EXTREMITIES: No edema or swelling  SKIN: Warm and dry    Assessment/Plan:  62 y.o. male with SCLC needing chemotherapy; Dysphagia likely 2/2 mediastinal LAD     OR today for Mediport   NPO after MN  Will request IR to do thoracentesis after mediport  Will need dysphagia workup, may need PEG, timing TBD.   If due to lymphadenopathy then needs chemo to help decrease size of lymph nodes    Will be d/w     Electronically signed by Maureen Murillo DO on 6/22/2021 at 6:28 AM

## 2021-06-22 NOTE — PROGRESS NOTES
[facility] Progress Note    Admitting Date and Time: 6/18/2021  6:54 AM  Admit Dx: Acute on chronic respiratory failure with hypoxia (HCC) [J96.21]    Subjective:  Patient is being followed for Acute on chronic respiratory failure with hypoxia ([address] [J96.21]   Pt feels improvement in his breathing, reports still bringing up a lot of phlegm. Per RN: No major concerns, IR guided thoracocentesis today, has Mediport inserted. ROS: denies fever, chills, cp, sob, n/v, HA unless stated above.      methylPREDNISolone  40 mg Intravenous Q8H    cefTRIAXone (ROCEPHIN) IV  1,000 mg Intravenous Q24H    doxycycline (VIBRAMYCIN) IV  100 mg Intravenous Q12H    sodium chloride flush  5-40 mL Intravenous 2 times per day    [Held by provider] enoxaparin  40 mg Subcutaneous Daily    famotidine  20 mg Oral BID    ipratropium-albuterol  1 ampule Inhalation Q4H WA    nystatin  5 mL Oral 4x Daily    nicotine  1 patch Transdermal Daily     guaiFENesin, 400 mg, 4x Daily PRN  sodium chloride flush, 5-40 mL, PRN  sodium chloride, 25 mL, PRN  ondansetron, 4 mg, Q8H PRN   Or  ondansetron, 4 mg, Q6H PRN  polyethylene glycol, 17 g, Daily PRN  acetaminophen, 650 mg, Q6H PRN   Or  acetaminophen, 650 mg, Q6H PRN         Objective:    /66   Pulse 96   Temp 97.7 °F (36.5 °C) (Oral)   Resp 20   Ht 5' 7\" (1.702 m)   Wt 105 lb 9.6 oz (47.9 kg)   SpO2 95%   BMI 16.54 kg/m²     General Appearance: alert and oriented to person, place and time and in no acute distress, 4 L oxygen by nasal cannula  Skin: warm and dry  Head: normocephalic and atraumatic  Eyes: pupils equal, round, and reactive to light, extraocular eye movements intact, conjunctivae normal  Neck: neck supple and non tender without mass   Pulmonary/Chest: diminished bilaterally rt > Left - no wheezes, rales or rhonchi, normal air movement, no respiratory distress  Cardiovascular: normal rate, normal S1 and S2 and no carotid bruits  Abdomen: soft, non-tender, non-distended, normal bowel sounds, no masses or organomegaly  Extremities: no cyanosis, no clubbing and no edema  Neurologic: no cranial nerve deficit and speech normal        Recent Labs     06/20/21  0705 06/21/21  0620    139   K 3.6 3.5    98   CO2 34* 33*   BUN 14 13   CREATININE 0.5* 0.4*   GLUCOSE 110* 125*   CALCIUM 9.4 9.4       Recent Labs     06/20/21  0705 06/21/21  0620   WBC 8.9 7.0   RBC 4.36 4.15   HGB 12.4* 12.2*   HCT 39.7 37.9   MCV 91.1 91.3   MCH 28.4 29.4   MCHC 31.2* 32.2   RDW 14.6 14.7    212   MPV 8.2 8.2       Micro:  No components found for: [facility])    Radiology:   No results found. Assessment:    Active Problems:    Acute on chronic respiratory failure with hypoxia (HCC)  Resolved Problems:    * No resolved hospital problems. *      Plan:  1. Acute on Chronic Hypoxic  Respiratory Failure with underlying lung cancer, emphysematous changes, right pleural effusion -respiratory panel negative, continue respiratory support with oxygen and nebs. On Vest therapy. pulmonary and Oncology consulted. Status post right subclavian Mediport insertion 6/22. 2.  Small Cell Lung Cancer - Oncology consult. 3.  Tobacco Dependence - Nicoderm 21 mg patch daily. 4.  Hoarseness - nystatin suspension qid.   5.  Acute COPD exacerbation with possible postobstructive pneumonia/aspiration pneumonia- IR guided thoracocentesis, on IV Rocephin and doxy. On IV steroids wean as tolerated, incentive spirometry. 6.  Dysphagia - secondary to mediastinal lymphadenopathy, likely distended esophagus ?- will need dysphagia work-up, possible PEG, general surgery on board. Consulted speech. DVT prophylaxis -Lovenox    NOTE: This report was transcribed using voice recognition software. Every effort was made to ensure accuracy; however, inadvertent computerized transcription errors may be present.   Electronically signed by Alberto Ramirez MD on 6/22/2021 at 2:52 PM

## 2021-06-22 NOTE — PROGRESS NOTES
IRFLOWSHEET    Date: 6/22/2021    Time: 12:43 PM     Exam: Ultrasound Guided Right thoracentesis    Radiologist Performing Procedure: Dr. Irma Stone    Nurse Reporting/Phone: [phone]     Nurse Receiving: Carmen Fu RN    Permit signed:      Yes    ID Band Checklist: Yes    Allergies: Patient has no known allergies. TIME OUT: 1258    PROCEDURE START TIME: 5059    Puncture Site: Right posterior Chest    Puncture Time: 1300    Catheters: 5Fr. 7CM    LABS:   Lab Results   Component Value Date    INR 1.2 06/22/2021    PROTIME 13.5 (H) 06/22/2021           Lab Results   Component Value Date    CREATININE 0.4 (L) 06/21/2021    BUN 13 06/21/2021          Lab Results   Component Value Date    HGB 12.2 (L) 06/21/2021    HCT 37.9 06/21/2021     06/21/2021         PROCEDURE END TIME: 1308    Total Contrast: N/A    Fluoroscopy Time: N/A    Complications:  None    Comments: Pt arrived to department for thoracentesis from floor. Pt is alert and oriented, pt denies any shortness of breath or chest pain prior to procedure. History and medications reviewed with patient/family. Procedure is explained to patient, including possible risks by Dr. Irma Stone. Pt verbalizes understanding and consent form signed. Pt positioned sitting on side of bed for procedure, ultrasound images taken prior to procedure and reviewed with physician. Procedure done under sterile technique and guidance of ultrasound. A total of  1,200ml's of june colored fluid drained during procedure. Catheter removed and op-site dressing applied to site with no bleeding. Pt denies any chest pain or shortness of breath after procedure. Pt monitored with no complications. Pt report given to nurse on floor. Pt transferred back to floor via cart by transport staff.

## 2021-06-22 NOTE — PROGRESS NOTES
Inpatient Medical Oncology Progress Note    Subjective:  No fever. Getting a breathing treatment. Objective:  /66   Pulse 96   Temp 97.7 °F (36.5 °C) (Oral)   Resp 20   Ht 5' 7\" (1.702 m)   Wt 105 lb 9.6 oz (47.9 kg)   SpO2 95%   BMI 16.54 kg/m²   GENERAL: Alert, oriented x 3, not in acute distress. HEENT: PERRLA; EOMI. Oropharynx clear. NECK: Supple. Without lymphadenopathy. LUNGS: wheezing tangela. CARDIOVASCULAR: Regular rate. No murmurs, rubs or gallops. ABDOMEN: Soft. Non-tender, non-distended. Positive bowel sounds. EXTREMITIES: Without clubbing, cyanosis, or edema. NEUROLOGIC: No focal deficits.    ECOG PS 1    Diagnostics:  Lab Results   Component Value Date    WBC 7.0 06/21/2021    HGB 12.2 (L) 06/21/2021    HCT 37.9 06/21/2021    MCV 91.3 06/21/2021     06/21/2021     Lab Results   Component Value Date     06/21/2021    K 3.5 06/21/2021    CL 98 06/21/2021    CO2 33 (H) 06/21/2021    BUN 13 06/21/2021    CREATININE 0.4 (L) 06/21/2021    GLUCOSE 125 (H) 06/21/2021    CALCIUM 9.4 06/21/2021    PROT 7.3 06/21/2021    LABALBU 3.4 (L) 06/21/2021    BILITOT 0.5 06/21/2021    ALKPHOS 72 06/21/2021    AST 16 06/21/2021    ALT 8 06/21/2021    LABGLOM >60 06/21/2021    GFRAA >60 06/21/2021     Impression/Plan:  62 y.o male with Extensive SCLC     PET/CT on 06/15/2021 Mass centered in the right hilar region extending into the mediastinum consistent with history of small cell lung cancer with metabolically active metastatic mediastinal lymphadenopathy extending into the bilateral supraclavicular region and inferiorly into the retrocrural region.   Metabolically active right pleural metastases with corresponding pleural effusion  Metabolically active nodules in the skin of the abdomen are concerning for metastatic disease.   The esophagus is distended which appears to be secondary to compression by lower mediastinal lymphadenopathy.   Intense activity in the collapsed right lower lobe could be due to additional malignancy or postobstructive pneumonia.      Extensive SCLC, Recommended systemic therapy consisting of Carboplatin + Etoposide + Tecentriq. Side effects reviewed. He agreed to proceed.   Cycle # 1 Carboplatin + Etoposide + Tecentriq was on 06/16/2021  He had presented to the hospital on 06/18/2021 for worsening shortness of breath. CTA chest noted no PE. Moderate right pleural effusion  Advanced emphysematous changes with superimposed pneumonia seen within the with the previous CT scan of 06/11/2021 and PET-CT scan of 06/16/2021  Large right perihilar mass with extension into the mediastinum  Significant right hilar mediastinal core right paratracheal and supra and infraclavicular LN  Pleural metastasis. Chronically distended esophagus   The patient was admitted with acute COPD exacerbation, possible postobstructive pneumonia  The patient is doing better clinically. Pulmonary team on board  Vest treatments to facilitate bronchopulmonary clearance  IR guided right thoracentesis today 06/22/2021; A total of 1200 cc of pleural effusion was removed  Continue IV steroids, nebs. Continue antibiotics, on IV ceftriaxone and doxycycline  Continue to monitor his CBCD. Right subclavian Mediport today 06/22/2021  Speech consulted. GS team on board for PEG evaluation  Will continue to follow.     06/22/2021  Lorraine Santamaria MD

## 2021-06-22 NOTE — PLAN OF CARE
Problem: Falls - Risk of:  Goal: Will remain free from falls  Description: Will remain free from falls  6/22/2021 1757 by Lauren Gusman RN  Outcome: Met This Shift  6/22/2021 0405 by Beryle Schneider, RN  Outcome: Met This Shift  Goal: Absence of physical injury  Description: Absence of physical injury  6/22/2021 1757 by Lauren Gusman RN  Outcome: Met This Shift  6/22/2021 0405 by Beryle Schneider, RN  Outcome: Met This Shift     Problem: Pain:  Goal: Pain level will decrease  Description: Pain level will decrease  6/22/2021 1757 by Lauren Gusman RN  Outcome: Met This Shift  6/22/2021 0405 by Beryle Schneider, RN  Outcome: Met This Shift  Goal: Control of acute pain  Description: Control of acute pain  6/22/2021 1757 by Lauren Gusman RN  Outcome: Met This Shift  6/22/2021 0405 by Beryle Schneider, RN  Outcome: Met This Shift  Goal: Control of chronic pain  Description: Control of chronic pain  6/22/2021 1757 by Lauren Gusman RN  Outcome: Met This Shift  6/22/2021 0405 by Beryle Schneider, RN  Outcome: Met This Shift     Problem: FLUID AND ELECTROLYTE IMBALANCE  Goal: Fluid and electrolyte balance are achieved/maintained  6/22/2021 1757 by Lauren Gusman RN  Outcome: Met This Shift  6/22/2021 0405 by Beryle Schneider, RN  Outcome: Met This Shift     Problem: Gas Exchange - Impaired  Goal: Able to breathe comfortably  Description: Able to breathe comfortably  6/22/2021 1757 by Lauren Gusman RN  Outcome: Met This Shift  6/22/2021 0405 by Beryle Schneider, RN  Outcome: Met This Shift

## 2021-06-22 NOTE — ANESTHESIA PRE PROCEDURE
Department of Anesthesiology  Preprocedure Note       Name:  Murali Carlin   Age:  62 y.o.  :  1960                                          MRN:  [id number]         Date:  2021      Surgeon: Michaela Padilla):  Nathalia Keyes MD    Procedure: Procedure(s):  MEDI PORT INSERTION    Medications prior to admission:   Prior to Admission medications    Medication Sig Start Date End Date Taking?  Authorizing Provider   acetaminophen (TYLENOL) 500 MG tablet Take 500 mg by mouth every 6 hours as needed for Pain   Yes Historical Provider, MD   ondansetron (ZOFRAN-ODT) 4 MG disintegrating tablet Take 1 tablet by mouth every 8 hours as needed for Nausea or Vomiting 21  Yes Ady Barragan MD       Current medications:    Current Facility-Administered Medications   Medication Dose Route Frequency Provider Last Rate Last Admin    methylPREDNISolone sodium (SOLU-MEDROL) injection 40 mg  40 mg Intravenous Q8H Jessica Hartley MD   40 mg at 21 0500    guaiFENesin tablet 400 mg  400 mg Oral 4x Daily PRN Jessica Hartley MD        cefTRIAXone (ROCEPHIN) 1,000 mg in sterile water 10 mL IV syringe  1,000 mg Intravenous Q24H Franco Morris MD   1,000 mg at 21 1034    doxycycline (VIBRAMYCIN) 100 mg in dextrose 5 % 100 mL IVPB  100 mg Intravenous Q12H Joel Beaver MD   Stopped at 21 0046    sodium chloride flush 0.9 % injection 5-40 mL  5-40 mL Intravenous 2 times per day Joel Beaver MD   10 mL at 21 2105    sodium chloride flush 0.9 % injection 5-40 mL  5-40 mL Intravenous PRN Joel Beaver MD   10 mL at 21 1034    0.9 % sodium chloride infusion  25 mL Intravenous PRN Joel Beaver MD        [Held by provider] enoxaparin (LOVENOX) injection 40 mg  40 mg Subcutaneous Daily Joel Beaver MD   40 mg at 21 1641    ondansetron (ZOFRAN-ODT) disintegrating tablet 4 mg  4 mg Oral Q8H PRN Joel Beaver MD   4 mg at 21 0859    Or    ondansetron (Kell Ingram) injection 4 mg  4 mg Intravenous Q6H PRN Maira Lance MD        polyethylene glycol (GLYCOLAX) packet 17 g  17 g Oral Daily PRN Maira Lance MD        acetaminophen (TYLENOL) tablet 650 mg  650 mg Oral Q6H PRN Maira Lance MD        Or    acetaminophen (TYLENOL) suppository 650 mg  650 mg Rectal Q6H PRN Maira Lance MD        famotidine (PEPCID) tablet 20 mg  20 mg Oral BID Maira Lance MD   20 mg at 06/21/21 2104    ipratropium-albuterol (DUONEB) nebulizer solution 1 ampule  1 ampule Inhalation Q4H WA Maira Lance MD   1 ampule at 06/21/21 2009    nystatin (MYCOSTATIN) 305400 UNIT/ML suspension 500,000 Units  5 mL Oral 4x Daily Maira Lance MD   500,000 Units at 06/21/21 2104    nicotine (NICODERM CQ) 21 MG/24HR 1 patch  1 patch Transdermal Daily Maira Lance MD   1 patch at 06/21/21 2105       Allergies:  No Known Allergies    Problem List:    Patient Active Problem List   Diagnosis Code    Acute non-recurrent maxillary sinusitis J01.00    [facility] C34.90    Acute on chronic respiratory failure with hypoxia (HCC) J96.21       Past Medical History:        Diagnosis Date    Alcohol abuse     Cancer ([facility]     Lung    Lung mass     right    Seasonal allergies     Tobacco abuse        Past Surgical History:        Procedure Laterality Date    BRONCHOSCOPY N/A 6/3/2021    BRONCHOSCOPY W/EBUS FNA performed by Cecily Dhillon MD at [place] [address] N/A 6/3/2021    BRONCHOSCOPY performed by Cecily Dhillon MD at [facility] 5/17/2021    RIGHT THORACENTESIS ULTRASOUND performed by Cecily Dhillon MD at [address] [place] [address]         Social History:    Social History     Tobacco Use    Smoking status: Current Every Day Smoker     Packs/day: 0.50     Start date: 6/18/1975    Smokeless tobacco: Never Used    Tobacco comment: was 1 pack daily   Substance Use Topics    Alcohol use: Not Currently     Alcohol/week: 25.0 standard drinks     Types: 25 Cans of beer per week     Comment: not currently                                Ready to quit: Not Answered  Counseling given: Not Answered  Comment: was 1 pack daily      Vital Signs (Current):   Vitals:    06/21/21 2021 06/21/21 2100 06/22/21 0000 06/22/21 0050   BP:  97/62 119/77    Pulse:  90 83    Resp:  20 20    Temp:  97.9 °F (36.6 °C) 98.1 °F (36.7 °C)    TempSrc:  Oral Oral    SpO2: 91% 97% 94%    Weight:    105 lb 9.6 oz (47.9 kg)   Height:                                                  BP Readings from Last 3 Encounters:   06/22/21 119/77   06/17/21 105/66   06/16/21 99/66       NPO Status: Time of last liquid consumption: 2100                        Time of last solid consumption: 2100                        Date of last liquid consumption: 06/21/21                        Date of last solid food consumption: 06/21/21    BMI:   Wt Readings from Last 3 Encounters:   06/22/21 105 lb 9.6 oz (47.9 kg)   06/16/21 104 lb 11.2 oz (47.5 kg)   06/14/21 106 lb 3.2 oz (48.2 kg)     Body mass index is 16.54 kg/m². CBC:   Lab Results   Component Value Date    WBC 7.0 06/21/2021    RBC 4.15 06/21/2021    HGB 12.2 06/21/2021    HCT 37.9 06/21/2021    MCV 91.3 06/21/2021    RDW 14.7 06/21/2021     06/21/2021       CMP:   Lab Results   Component Value Date     06/21/2021    K 3.5 06/21/2021    CL 98 06/21/2021    CO2 33 06/21/2021    BUN 13 06/21/2021    CREATININE 0.4 06/21/2021    GFRAA >60 06/21/2021    LABGLOM >60 06/21/2021    GLUCOSE 125 06/21/2021    PROT 7.3 06/21/2021    CALCIUM 9.4 06/21/2021    BILITOT 0.5 06/21/2021    ALKPHOS 72 06/21/2021    AST 16 06/21/2021    ALT 8 06/21/2021       POC Tests: No results for input(s): POCGLU, POCNA, POCK, POCCL, POCBUN, POCHEMO, POCHCT in the last 72 hours.     Coags:   Lab Results   Component Value Date    PROTIME 13.5 06/22/2021    INR 1.2 06/22/2021       HCG (If Applicable): No results enlargement Rightward axis Possible Anterior infarct (cited on or before 07-MAY-2021) Abnormal ECG When compared with ECG of 07-MAY-2021 10:30, No significant change was found Confirmed by Luke Perales ([id number]) on 6/18/2021 4:28:23 PM     Neuro/Psych:   (+) psychiatric history:            GI/Hepatic/Renal: Neg GI/Hepatic/Renal ROS            Endo/Other:    (+) malignancy/cancer (Small cell lung CA). Abdominal:           Vascular: negative vascular ROS. Anesthesia Plan      MAC     ASA 4       Induction: intravenous. MIPS: Postoperative opioids intended and Prophylactic antiemetics administered. Anesthetic plan and risks discussed with patient.                       Sade Best MD   6/22/2021

## 2021-06-22 NOTE — OP NOTE
Operative Note    Michael Marie     DATE OF PROCEDURE: 6/22/2021  SURGEON: Surgeon(s):  Tiana Olivera MD    PREOPERATIVE DIAGNOSIS: Lung cancer    POSTOPERATIVE DIAGNOSIS: Same    OPERATION: Placement of right subclavian SmartPort. ANESTHESIA: Local 1% plain lidocaine with monitored anesthesia care. ESTIMATED BLOOD LOSS: minimal    COMPLICATIONS: None. SPECIMEN: None. BRIEF HISTORY: Michael Marie is a 62 y.o.  male  who was recently diagnosed with lung cancer. He  will require a MediPort for long-term IV access. I discussed \"placement of MediPort\" with him, including the procedure, benefits, risks, and alternatives, and he agreed. PROCEDURE IN DETAIL: The patient was taken to the operative suite and was placed on the table in a supine position with a vertical roll placed between the shoulders to splay them laterally, the arms pulled downward, and the head turned to the left. The neck, chest, and shoulders were prepped with Chloraprep and draped in a sterile fashion. The patient  was placed into Trendelenburg position and was left in Trendelenburg position during the entire procedure. The skin, subcutaneous tissue, and periosteum of the right clavicle were anesthetized with 0.25% Marcaine with epinephrine. After this, a right subclavian venipuncture was performed and a guidewire was passed through the needle and into the superior vena cava. Its position was confirmed using the fluoroscopy unit. The guidewire was then clamped to the drapes using a rubber-shod clamp to prevent inward and outward migration. Attention was turned to creation of the port pocket. The port pocket was marked in the infraclavicular position adjacent to the venipuncture site. A transverse linear incision was made and was carried down through the subcutaneous tissue and to the muscular fascia using the electrocautery.  A combination of sharp dissection with the electrocautery and blunt finger dissection was used to create a pocket for the port, just large enough to accommodate a single-lumen SmartPort. The dilator and introducer assembly were passed over the guidewire and into the right subclavian vein, and then the dilator and guidewire were removed. The tubing was passed through the introducer and into the superior vena cava, and it was positioned just above the level of the heart using the fluoroscopy unit. The introducer was then split and removed. The excess tubing was then cut and then the supplied connector was used to connect the tubing to the port itself. There was good blood return from the port and the port flushed easily with heparinized saline. The port was then placed into the port pocket and was sutured to Paula fascia using two 3-0 prolene stitches to prevent rolling and flipping of the port. The patient was then placed flat. Any bleeding points were cauterized. The dermis was closed with interrupted 3-0 Vicryl sutures and the skin was closed with 4-0 Monocryl subcuticular sutures, and dermabond was applied. The patient tolerated the procedure well. Sponge, needle, and instrument counts were correct. The patient had a stat portable upright chest x-ray done as soon as she was transferred to the cart for transport to the PACU. Ike Bateman MD, MD, [facility] 6/22/2021 8:34 AM     Send copy of H&P to PCP, Lamin Lopez DO and referring physician, No ref.  provider found

## 2021-06-22 NOTE — PROGRESS NOTES
Spoke with Queen Kati and gave report. Will send patient back up to room IR not ready for patient yet.

## 2021-06-23 ENCOUNTER — ANESTHESIA EVENT (OUTPATIENT)
Dept: ENDOSCOPY | Age: 61
DRG: 951 | End: 2021-06-23
Payer: COMMERCIAL

## 2021-06-23 ENCOUNTER — APPOINTMENT (OUTPATIENT)
Dept: GENERAL RADIOLOGY | Age: 61
DRG: 951 | End: 2021-06-23
Payer: COMMERCIAL

## 2021-06-23 PROCEDURE — 99233 SBSQ HOSP IP/OBS HIGH 50: CPT | Performed by: INTERNAL MEDICINE

## 2021-06-23 PROCEDURE — 6360000002 HC RX W HCPCS: Performed by: STUDENT IN AN ORGANIZED HEALTH CARE EDUCATION/TRAINING PROGRAM

## 2021-06-23 PROCEDURE — 2700000000 HC OXYGEN THERAPY PER DAY

## 2021-06-23 PROCEDURE — 92610 EVALUATE SWALLOWING FUNCTION: CPT | Performed by: SPEECH-LANGUAGE PATHOLOGIST

## 2021-06-23 PROCEDURE — 2580000003 HC RX 258: Performed by: STUDENT IN AN ORGANIZED HEALTH CARE EDUCATION/TRAINING PROGRAM

## 2021-06-23 PROCEDURE — 74230 X-RAY XM SWLNG FUNCJ C+: CPT

## 2021-06-23 PROCEDURE — 6360000002 HC RX W HCPCS

## 2021-06-23 PROCEDURE — 94669 MECHANICAL CHEST WALL OSCILL: CPT

## 2021-06-23 PROCEDURE — 92526 ORAL FUNCTION THERAPY: CPT | Performed by: SPEECH-LANGUAGE PATHOLOGIST

## 2021-06-23 PROCEDURE — 94640 AIRWAY INHALATION TREATMENT: CPT

## 2021-06-23 PROCEDURE — 6370000000 HC RX 637 (ALT 250 FOR IP): Performed by: STUDENT IN AN ORGANIZED HEALTH CARE EDUCATION/TRAINING PROGRAM

## 2021-06-23 PROCEDURE — 2500000003 HC RX 250 WO HCPCS: Performed by: RADIOLOGY

## 2021-06-23 PROCEDURE — 92611 MOTION FLUOROSCOPY/SWALLOW: CPT | Performed by: SPEECH-LANGUAGE PATHOLOGIST

## 2021-06-23 PROCEDURE — 1200000000 HC SEMI PRIVATE

## 2021-06-23 PROCEDURE — 99233 SBSQ HOSP IP/OBS HIGH 50: CPT | Performed by: STUDENT IN AN ORGANIZED HEALTH CARE EDUCATION/TRAINING PROGRAM

## 2021-06-23 PROCEDURE — 2500000003 HC RX 250 WO HCPCS: Performed by: STUDENT IN AN ORGANIZED HEALTH CARE EDUCATION/TRAINING PROGRAM

## 2021-06-23 RX ORDER — METHYLPREDNISOLONE SODIUM SUCCINATE 40 MG/ML
40 INJECTION, POWDER, LYOPHILIZED, FOR SOLUTION INTRAMUSCULAR; INTRAVENOUS EVERY 12 HOURS
Status: DISCONTINUED | OUTPATIENT
Start: 2021-06-24 | End: 2021-06-24

## 2021-06-23 RX ORDER — HEPARIN SODIUM (PORCINE) LOCK FLUSH IV SOLN 100 UNIT/ML 100 UNIT/ML
SOLUTION INTRAVENOUS
Status: COMPLETED
Start: 2021-06-23 | End: 2021-06-23

## 2021-06-23 RX ADMIN — IPRATROPIUM BROMIDE AND ALBUTEROL SULFATE 1 AMPULE: 2.5; .5 SOLUTION RESPIRATORY (INHALATION) at 12:22

## 2021-06-23 RX ADMIN — METHYLPREDNISOLONE SODIUM SUCCINATE 40 MG: 40 INJECTION, POWDER, LYOPHILIZED, FOR SOLUTION INTRAMUSCULAR; INTRAVENOUS at 05:06

## 2021-06-23 RX ADMIN — BARIUM SULFATE 70 G: 0.81 POWDER, FOR SUSPENSION ORAL at 11:29

## 2021-06-23 RX ADMIN — BARIUM SULFATE 10 ML: 400 PASTE ORAL at 11:28

## 2021-06-23 RX ADMIN — Medication 5 ML: at 20:56

## 2021-06-23 RX ADMIN — DOXYCYCLINE 100 MG: 100 INJECTION, POWDER, LYOPHILIZED, FOR SOLUTION INTRAVENOUS at 15:03

## 2021-06-23 RX ADMIN — BARIUM SULFATE 120 ML: 400 SUSPENSION ORAL at 11:29

## 2021-06-23 RX ADMIN — METHYLPREDNISOLONE SODIUM SUCCINATE 40 MG: 40 INJECTION, POWDER, LYOPHILIZED, FOR SOLUTION INTRAMUSCULAR; INTRAVENOUS at 15:03

## 2021-06-23 RX ADMIN — IPRATROPIUM BROMIDE AND ALBUTEROL SULFATE 1 AMPULE: 2.5; .5 SOLUTION RESPIRATORY (INHALATION) at 08:21

## 2021-06-23 RX ADMIN — IPRATROPIUM BROMIDE AND ALBUTEROL SULFATE 1 AMPULE: 2.5; .5 SOLUTION RESPIRATORY (INHALATION) at 21:14

## 2021-06-23 RX ADMIN — WATER 1000 MG: 1 INJECTION INTRAMUSCULAR; INTRAVENOUS; SUBCUTANEOUS at 15:04

## 2021-06-23 RX ADMIN — Medication 500 UNITS: at 15:04

## 2021-06-23 RX ADMIN — IPRATROPIUM BROMIDE AND ALBUTEROL SULFATE 1 AMPULE: 2.5; .5 SOLUTION RESPIRATORY (INHALATION) at 16:04

## 2021-06-23 ASSESSMENT — LIFESTYLE VARIABLES: SMOKING_STATUS: 1

## 2021-06-23 ASSESSMENT — PAIN SCALES - GENERAL: PAINLEVEL_OUTOF10: 0

## 2021-06-23 NOTE — PROGRESS NOTES
Spoke with Dr. Chip Meadows regarding PEG and Discharge planning, will reach out to surgery regarding PEG placement now vs outpatient. Electronically signed by Jl Gomez RN on 6/23/2021 at 12:26 PM    Message sent to Dr. Vandana Whitten regarding above.      Electronically signed by Jl Gomez RN on 6/23/2021 at 12:26 PM

## 2021-06-23 NOTE — PLAN OF CARE
Problem: Falls - Risk of:  Goal: Will remain free from falls  Description: Will remain free from falls  4/81/1805 2615 by Roger Juarez RN  Outcome: Ongoing  6/22/2021 1757 by Natalie Dunaway RN  Outcome: Met This Shift  Goal: Absence of physical injury  Description: Absence of physical injury  9/80/6692 5902 by Roger Juarez RN  Outcome: Ongoing  6/22/2021 1757 by Natalie Dunaway RN  Outcome: Met This Shift     Problem: Pain:  Goal: Pain level will decrease  Description: Pain level will decrease  6/04/1453 0864 by Roger Juarez RN  Outcome: Ongoing  6/22/2021 1757 by Natalie Dunaway RN  Outcome: Met This Shift  Goal: Control of acute pain  Description: Control of acute pain  0/47/1828 1155 by Roger Juarez RN  Outcome: Ongoing  6/22/2021 1757 by Natalie Dunaway RN  Outcome: Met This Shift  Goal: Control of chronic pain  Description: Control of chronic pain  4/57/8200 7432 by Roger Juarez RN  Outcome: Ongoing  6/22/2021 1757 by Natalie Dunaway RN  Outcome: Met This Shift     Problem: FLUID AND ELECTROLYTE IMBALANCE  Goal: Fluid and electrolyte balance are achieved/maintained  4/00/5358 1334 by Roger Juarez RN  Outcome: Ongoing  6/22/2021 1757 by Natalie Dunaway RN  Outcome: Met This Shift     Problem: Gas Exchange - Impaired  Goal: Able to breathe comfortably  Description: Able to breathe comfortably  0/59/1955 6594 by Roger Juarez RN  Outcome: Ongoing  6/22/2021 1757 by Natalie Dunaway RN  Outcome: Met This Shift

## 2021-06-23 NOTE — PLAN OF CARE
Problem: Falls - Risk of:  Goal: Will remain free from falls  Description: Will remain free from falls  7/03/5995 2820 by Kaya Roblero RN  Outcome: Ongoing  3/75/0075 7025 by Kaya Roblero RN  Outcome: Ongoing  6/22/2021 1757 by Lenny Somers RN  Outcome: Met This Shift  Goal: Absence of physical injury  Description: Absence of physical injury  5/55/5773 9992 by Kaya Roblero RN  Outcome: Ongoing  1/13/9674 2491 by Kaya Roblero RN  Outcome: Ongoing  6/22/2021 1757 by Lenny Somers RN  Outcome: Met This Shift     Problem: Pain:  Goal: Pain level will decrease  Description: Pain level will decrease  2/70/0004 8194 by Kaya Roblero RN  Outcome: Ongoing  2/17/4404 8707 by Kaya Roblero RN  Outcome: Ongoing  6/22/2021 1757 by Lenny Somers RN  Outcome: Met This Shift  Goal: Control of acute pain  Description: Control of acute pain  1/32/3330 5187 by Kaya Roblero RN  Outcome: Ongoing  6/35/9081 4577 by Kaya Roblero RN  Outcome: Ongoing  6/22/2021 1757 by Lenny Somers RN  Outcome: Met This Shift  Goal: Control of chronic pain  Description: Control of chronic pain  6/42/6517 5036 by Kaya Roblero RN  Outcome: Ongoing  7/70/9979 2348 by Kaya Roblero RN  Outcome: Ongoing  6/22/2021 1757 by Lenny Somers RN  Outcome: Met This Shift     Problem: FLUID AND ELECTROLYTE IMBALANCE  Goal: Fluid and electrolyte balance are achieved/maintained  8/68/0095 9835 by Kaya Roblero RN  Outcome: Ongoing  7/97/0385 5120 by Kaya Roblero RN  Outcome: Ongoing  6/22/2021 1757 by Lenny Somers RN  Outcome: Met This Shift     Problem: Gas Exchange - Impaired  Goal: Able to breathe comfortably  Description: Able to breathe comfortably  6/57/7422 7229 by Kaya Roblero RN  Outcome: Ongoing  1/49/9390 4568 by Kaya Roblero RN  Outcome: Ongoing  6/22/2021 1757 by Lenny Somers RN  Outcome: Met This Shift

## 2021-06-23 NOTE — PROGRESS NOTES
Pulmonary Progress Note    Admit Date: 2021  Hospital day                               PCP: Mena Edouard DO    Chief Complaint (s):  Patient Active Problem List   Diagnosis    Acute non-recurrent maxillary sinusitis    Small cell lung cancer ([address]    Acute on chronic respiratory failure with hypoxia (HCC)       Subjective:  · Seen today, the patient insists on going home prior to an open gastrostomy. Vitals:  VITALS:  /73   Pulse 92   Temp 98.8 °F (37.1 °C) (Oral)   Resp 18   Ht 5' 7\" (1.702 m)   Wt 105 lb 9.6 oz (47.9 kg)   SpO2 97%   BMI 16.54 kg/m²     24HR INTAKE/OUTPUT:      Intake/Output Summary (Last 24 hours) at 2021 1427  Last data filed at 2021 0719  Gross per 24 hour   Intake 120 ml   Output --   Net 120 ml       24HR PULSE OXIMETRY RANGE:    SpO2  Av %  Min: 93 %  Max: 97 %    Medications:  IV:   sodium chloride         Scheduled Meds:   methylPREDNISolone  40 mg Intravenous Q8H    cefTRIAXone (ROCEPHIN) IV  1,000 mg Intravenous Q24H    doxycycline (VIBRAMYCIN) IV  100 mg Intravenous Q12H    sodium chloride flush  5-40 mL Intravenous 2 times per day    [Held by provider] enoxaparin  40 mg Subcutaneous Daily    famotidine  20 mg Oral BID    ipratropium-albuterol  1 ampule Inhalation Q4H WA    nystatin  5 mL Oral 4x Daily    nicotine  1 patch Transdermal Daily       Diet:   Diet NPO     EXAM:  General: No distress. Alert. Eyes: PERRL. No sclera icterus. No conjunctival injection. ENT: No discharge. Pharynx clear. Neck: Trachea midline. Normal thyroid. Resp: No accessory muscle use. LLL rales. No wheezing. No rhonchi. Absent at the right base. CV: Regular rate. Regular rhythm. No murmur or rub. Abd: Non-tender. Non-distended. No masses. No organomegaly. Normal bowel sounds. Skin: Warm and dry. No nodule on exposed extremities. No rash on exposed extremities. Ext: No cyanosis, clubbing, edema  Lymph: No cervical LAD.  No supraclavicular LAD. M/S: No cyanosis. No joint deformity. Significant clubbing. Neuro: Awake. Follows commands. Positive pupils/gag/corneals. Normal pain response. Results:  CBC:   Recent Labs     06/21/21  0620   WBC 7.0   HGB 12.2*   HCT 37.9   MCV 91.3        BMP:   Recent Labs     06/21/21  0620      K 3.5   CL 98   CO2 33*   BUN 13   CREATININE 0.4*     LIVER PROFILE:   Recent Labs     06/21/21  0620   AST 16   ALT 8   BILITOT 0.5   ALKPHOS 72     PT/INR:   Recent Labs     06/22/21  0358   PROTIME 13.5*   INR 1.2     APTT: No results for input(s): APTT in the last 72 hours. Pathology:  1. N/A      Microbiology:  1. None    Recent ABG:   No results for input(s): PH, PO2, PCO2, HCO3, BE, O2SAT, METHB, O2HB, COHB, O2CON, HHB, THB in the last 72 hours. Recent Films:  FL MODIFIED BARIUM SWALLOW W VIDEO   Final Result   Positive aspiration to thin and nectar consistencies of barium contrast were. Please see separate speech pathology report for full discussion of findings   and recommendations. US THORACENTESIS Which side should the procedure be performed? Right   Final Result   Successful ultrasound guided thoracentesis. XR CHEST PORTABLE   Final Result   Right-sided chest port in good position. No pneumothorax. Moderate right-sided pleural effusions unchanged. FLUORO FOR SURGICAL PROCEDURES   Final Result   Intraprocedural fluoroscopic spot images as above. See separate procedure   report for more information. XR CHEST PORTABLE   Final Result   Stable abnormal chest with the mediastinal and hilar adenopathy with soft   tissue mass in the right hilum concerning for malignancy. Persistent bibasilar infiltrates and pleural effusion likely pneumonia or   edema. CTA CHEST W CONTRAST   Final Result   1. There is no evidence of a pulmonary embolus. 2. Moderate size right pleural effusion   3.  Advanced emphysematous changes with superimposed pneumonia seen within the   left lower lobe and within the lingula. This finding is new when compared   with the previous CT scan of 06/11/2021 and PET-CT scan of 06/16/2021.   4. Large right perihilar mass with extension into the mediastinum consistent   with the history of metastatic small cell lung CA   5. Significant right hilar, mediastinal core right paratracheal and supra and   infraclavicular lymphadenopathy. 6. Pleural metastasis. Chronically distended esophagus secondary to the   metastatic lymphadenopathy within the inferior posterior mediastinum. XR CHEST PORTABLE   Final Result   No significant change         IR Interventional Radiology Procedure Request    (Results Pending)       Assessment:  1. Large right pleural effusion: Likely malignant  2. Aspiration pneumonia, left side. It is unclear why a swallowing evaluation was done with the patient clearly has no esophageal lumen. 3. Esophageal occlusion: Secondary to likely malignant mediastinal adenopathy with external compression as seen above    Plan:  1. Open gastrostomy  2. Vest treatments to facilitate bronchopulmonary clearance  3. Would keep n.p.o. for now    Time at the bedside, reviewing labs and radiographs, reviewing updated notes and consultations, discussing with staff and family was more than 45 minutes. Please note that voice recognition technology was used in the preparation of this note and make therefore it may contain inadvertent transcription errors. If the patient is a COVID 19 isolation patient, the above physical exam reflects that of the examining physician for the day. Brittney Boucher MD,  M.D., F.C.C.P.     [facility], [address] [place] [address], [address] [place] [address], [facility]

## 2021-06-23 NOTE — PROGRESS NOTES
Inpatient Medical Oncology Progress Note    Subjective:  No fever. Eating breakfast    Objective:  /73   Pulse 92   Temp 98.8 °F (37.1 °C) (Oral)   Resp 18   Ht 5' 7\" (1.702 m)   Wt 105 lb 9.6 oz (47.9 kg)   SpO2 97%   BMI 16.54 kg/m²   GENERAL: Alert, oriented x 3, not in acute distress. HEENT: PERRLA; EOMI. Oropharynx clear. NECK: Supple. Without lymphadenopathy. LUNGS: Fair air entry  CARDIOVASCULAR: Regular rate. No murmurs, rubs or gallops. ABDOMEN: Soft. Non-tender, non-distended. Positive bowel sounds. EXTREMITIES: Without clubbing, cyanosis, or edema. NEUROLOGIC: No focal deficits.    ECOG PS 1    Diagnostics:  Lab Results   Component Value Date    WBC 7.0 06/21/2021    HGB 12.2 (L) 06/21/2021    HCT 37.9 06/21/2021    MCV 91.3 06/21/2021     06/21/2021     Lab Results   Component Value Date     06/21/2021    K 3.5 06/21/2021    CL 98 06/21/2021    CO2 33 (H) 06/21/2021    BUN 13 06/21/2021    CREATININE 0.4 (L) 06/21/2021    GLUCOSE 125 (H) 06/21/2021    CALCIUM 9.4 06/21/2021    PROT 7.3 06/21/2021    LABALBU 3.4 (L) 06/21/2021    BILITOT 0.5 06/21/2021    ALKPHOS 72 06/21/2021    AST 16 06/21/2021    ALT 8 06/21/2021    LABGLOM >60 06/21/2021    GFRAA >60 06/21/2021     Impression/Plan:  62 y.o male with Extensive SCLC     PET/CT on 06/15/2021 Mass centered in the right hilar region extending into the mediastinum consistent with history of small cell lung cancer with metabolically active metastatic mediastinal lymphadenopathy extending into the bilateral supraclavicular region and inferiorly into the retrocrural region.   Metabolically active right pleural metastases with corresponding pleural effusion  Metabolically active nodules in the skin of the abdomen are concerning for metastatic disease.   The esophagus is distended which appears to be secondary to compression by lower mediastinal lymphadenopathy.   Intense activity in the collapsed right lower lobe could be

## 2021-06-23 NOTE — PROGRESS NOTES
Surgical resident asked to see patient for PEG placement via Dr. Marjorie Hartman, also updated of Dr. Funmi Chanel stating patient needs Open Placement due to esophageal blockage

## 2021-06-23 NOTE — PROGRESS NOTES
General Surgery Progress Note    Asked to see patient again regarding failed swallow study. Team requesting PEG placement. Can attempt to perform tomorrow. Will discuss with Dr. Ana Houser.     Electronically signed by Edgar Stratton MD on 6/23/21 at 4:09 PM EDT

## 2021-06-23 NOTE — PROGRESS NOTES
[facility] Progress Note    Admitting Date and Time: 6/18/2021  6:54 AM  Admit Dx: Acute on chronic respiratory failure with hypoxia (HCC) [J96.21]    Subjective:  Patient is being followed for Acute on chronic respiratory failure with hypoxia ([address] [J96.21]   Pt feels improvement in his breathing, reports still bringing up a lot of phlegm. Per RN: No major concerns, wanted to leave, was convinced to stay for PEG tomorrow, still on oxygen. .    ROS: denies fever, chills, cp, sob, n/v, HA unless stated above.      methylPREDNISolone  40 mg Intravenous Q8H    cefTRIAXone (ROCEPHIN) IV  1,000 mg Intravenous Q24H    doxycycline (VIBRAMYCIN) IV  100 mg Intravenous Q12H    sodium chloride flush  5-40 mL Intravenous 2 times per day    [Held by provider] enoxaparin  40 mg Subcutaneous Daily    famotidine  20 mg Oral BID    ipratropium-albuterol  1 ampule Inhalation Q4H WA    nystatin  5 mL Oral 4x Daily    nicotine  1 patch Transdermal Daily     guaiFENesin, 400 mg, 4x Daily PRN  sodium chloride flush, 5-40 mL, PRN  sodium chloride, 25 mL, PRN  ondansetron, 4 mg, Q8H PRN   Or  ondansetron, 4 mg, Q6H PRN  polyethylene glycol, 17 g, Daily PRN  acetaminophen, 650 mg, Q6H PRN   Or  acetaminophen, 650 mg, Q6H PRN         Objective:    /73   Pulse 92   Temp 98.8 °F (37.1 °C) (Oral)   Resp 18   Ht 5' 7\" (1.702 m)   Wt 105 lb 9.6 oz (47.9 kg)   SpO2 97%   BMI 16.54 kg/m²     General Appearance: alert and oriented to person, place and time and in no acute distress, 3 L oxygen by nasal cannula  Skin: warm and dry  Head: normocephalic and atraumatic  Eyes: pupils equal, round, and reactive to light, extraocular eye movements intact, conjunctivae normal  Neck: neck supple and non tender without mass   Pulmonary/Chest: diminished bilaterally rt > Left - no wheezes, rales or rhonchi, normal air movement, no respiratory distress  Cardiovascular: normal rate, normal S1 and S2 and no carotid bruits  Abdomen: soft, non-tender, non-distended, normal bowel sounds, no masses or organomegaly  Extremities: no cyanosis, no clubbing and no edema  Neurologic: no cranial nerve deficit and speech normal        Recent Labs     06/21/21  0620      K 3.5   CL 98   CO2 33*   BUN 13   CREATININE 0.4*   GLUCOSE 125*   CALCIUM 9.4       Recent Labs     06/21/21  0620   WBC 7.0   RBC 4.15   HGB 12.2*   HCT 37.9   MCV 91.3   MCH 29.4   MCHC 32.2   RDW 14.7      MPV 8.2       Micro:  No components found for: [facility])    Radiology:   No results found. Assessment:    Active Problems:    Acute on chronic respiratory failure with hypoxia (HCC)  Resolved Problems:    * No resolved hospital problems. *      Plan:  1. Acute on Chronic Hypoxic  Respiratory Failure with underlying lung cancer, emphysematous changes, right pleural effusion -respiratory panel negative, continue respiratory support with oxygen and nebs. On Vest therapy. pulmonary and Oncology consulted. Status post right subclavian Mediport insertion 6/22. 2.  Small Cell Lung Cancer - Oncology consult. 3.  Tobacco Dependence - Nicoderm 21 mg patch daily. 4.  Hoarseness - nystatin suspension qid.   5.  Acute COPD exacerbation with possible postobstructive pneumonia/aspiration pneumonia- IR guided thoracocentesis 5/22, on IV Rocephin and doxy. On IV steroids wean as tolerated, incentive spirometry. 6.  Dysphagia - secondary to mediastinal lymphadenopathy, likely distended esophagus ?- will need dysphagia work-up, possible PEG tomorrow, general surgery on board. Consulted speech, failed swallow study. DVT prophylaxis -Lovenox  Social -will need home oxygen and home health care on discharge. NOTE: This report was transcribed using voice recognition software. Every effort was made to ensure accuracy; however, inadvertent computerized transcription errors may be present.   Electronically signed by Cong Wright MD on 6/23/2021 at 2:30 PM

## 2021-06-23 NOTE — PROGRESS NOTES
The Patient did not demonstrate complete understanding of the diagnosis, prognosis and plan of care     Rehabilitation Potential/Prognosis: fair                    ADMITTING DIAGNOSIS: Acute on chronic respiratory failure with hypoxia ([facility] [address] [J96.21]    VISIT DIAGNOSIS:   Visit Diagnoses       Codes    Pneumonia of left lower lobe due to infectious organism     J18.9    Pleural effusion     J90           PATIENT REPORT/COMPLAINT: patient reports foods and liquids come back up on him. He reports he is doing better swallowing recently. He reports a couple of days ago he would cough up so much he would fill a basin now today he feels this is much less since breakfast this am he has only filled up a 3/4 of a cup. RN cleared patient for participation in assessment     yes     PRIOR LEVEL OF SWALLOW FUNCTION:    PAST HISTORY OF DYSPHAGIA?: none reported    Home diet: Regular consistency solids with  thin liquids per patient and reports he only has had difficulty swallowing for a few weeks does report a 30 lb weight loss recently. Current diet:  ADULT DIET; Regular (Order #[id number]) on 6/22/21    PROCEDURE:  Consistencies Administered During the Evaluation   Liquids: thin liquid   Solids:  Not given       Method of Intake:   cup  Self fed      Position:   Seated, upright    CLINICAL ASSESSMENT:  Oral Stage: The oral stage of swallowing was within functional limits      Pharyngeal Stage:    Latent wet cough was noted after presentation of thin liquid  Wet respirations were noted after presentation of thin liquid  Wet/gurgly vocal quality was noted after presentation of thin liquid    Following intake patient continued to cough and spit out tea colored secretions for the remainder of the session with only having swallowed 2 mouth fulls.       Cognition:   Within functional limits for this exam    Oral Peripheral Examination   Adequate lingual/labial strength     Current Respiratory Status    3 liters nasal

## 2021-06-23 NOTE — PROGRESS NOTES
Speech Language Pathology      NAME:  Michael Marie  :  1960  DATE: 2021  ROOM:  [address] [place] [address]-A    Patient seen for swallow therapy 15 minutes. Reviewed current solid/liquid consistency diet recommendation for   NPO (nothing by mouth including oral meds)  and discussed compensatory strategies to ensure safe PO intake. Reviewed aspiration precautions. Discussed use of alternate source of nutrition to decrease risk of aspiration with implementation of strengthening exercises to improve swallow function as the long term goal.  Patient exhibited poor insight into to degree of current deficit. Lengthy discussion with patient regarding the degree of aspiration that is occurring especially from reflux however he reports this is less and he is swallowing better than a few days ago. We did discuss that given the volume that he is spitting up when he attempts to eat is resulting in chronic aspiration in addition to poor nutritional/hydration as his body is not able to benefit from what he is \"swallowing\" since he is coughing most of it back up and spitting it into a cup. Patient will require ongoing education regarding dysphagia.     Patient Active Problem List   Diagnosis    Acute non-recurrent maxillary sinusitis    [facility] cell lung [facility] ([facility] [address]    Acute on chronic respiratory failure with hypoxia [facility])       [address]  dysphagia tx    Brendan Gonzales MSCCC/SLP  Speech Language Pathologist  [id number]

## 2021-06-23 NOTE — PROGRESS NOTES
SPEECH/LANGUAGE PATHOLOGY  VIDEOFLUOROSCOPIC STUDY OF SWALLOWING (MBS)   and PLAN OF CARE    PATIENT NAME:  Dario Vaca  (male)     MRN:  [id number]    :  1960  (62 y.o.)  STATUS:  Inpatient: Room 0525/0525-A    TODAY'S DATE:  2021  REFERRING PROVIDER:   Ana ISAAC PROVIDER ORDER: FL modified barium swallow with video  Date of order:  21   REASON FOR REFERRAL: dysphagia    EVALUATING THERAPIST: ANA Hernandez      RESULTS:      DYSPHAGIA DIAGNOSIS:  severe  pharyngeal phase dysphagia  and esophageal motility deficit suspected     DIET RECOMMENDATIONS:   NPO: Recommend physician consideration for placement of alternative source for nutrition/hydration/medication administration if in accordance with patient/family wishes. Anticipated duration of SLP intervention to successfully rehabilitate swallowing function will exceed 14 days.       Lengthy discussion with patient regarding recommendations for NPO due to degree of pharyngeal and esophageal dysphagia on exam he continually stated \"I want to go home\"     FEEDING RECOMMENDATIONS:    Assistance level:  Not applicable     Compensatory strategies recommended: Not applicable     Discussed recommendations with nursing and/or faxed report to referring provider: Yes    SPEECH THERAPY  PLAN OF CARE   The dysphagia POC is established based on physician order and dysphagia diagnosis    Skilled SLP intervention for dysphagia management on acute care 3-5 x per week until goals met, pt plateaus in function and/or discharged from hospital      Conditions Requiring Skilled Therapeutic Intervention for dysphagia:    Reduced pharyngeal clearing of the bolus  reduced laryngeal closure resulting in penetration  Reduced laryngeal closure resulting in aspiration     SPECIFIC DYSPHAGIA INTERVENTIONS TO INCLUDE:     Training in positioning for improved integrity of swallow  Compensatory strategy training   Therapeutic exercises    Specific instructions for next treatment:  initiate instruction of therapeutic exercises   Treatment Goals:    Short Term Goals:  Pt will complete laryngeal strength/ ROM therapeutic exercises to improve airway protection for the least restrictive PO diet minimal     Long Term Goals:   Pt will improve oropharyngeal swallow function to ensure airway protection during PO intake to maintain adequate nutrition/hydration and decrease signs/symptoms of aspiration to less than 1 x/day. Patient/family Goal:    I want to go home     Plan of care discussed with Patient   The Patient did not demonstrate complete understanding of the diagnosis, prognosis and plan of care     Rehabilitation Potential/Prognosis: fair                      ADMITTING DIAGNOSIS: Acute on chronic respiratory failure with hypoxia (HCC) [J96.21]     VISIT DIAGNOSIS:   Visit Diagnoses       Codes    Pneumonia of left lower lobe due to infectious organism     J18.9    Pleural effusion     J90              PATIENT REPORT/COMPLAINT: patient reports food gets stuck and won't always go down and that he has to cough up a lot of mucous. He does not identify and airway compromise to his difficulty swallowing     PRIOR LEVEL OF SWALLOW FUNCTION:    Past History of Dysphagia?:  Yes--see above     Home diet: Regular consistency solids with  thin liquids    PROCEDURE:  Consistencies Administered During the Evaluation   Liquids: thin liquid and nectar thick liquid   Solids:  Not appropriate to give      Method of Intake:   spoon  Fed by clinician      Position:   Seated, upright, Lateral plane    CLINICAL ASSESSMENT:  (patient only given 1 tsp of thin barium and 1 tsp of nectar thick barium during exam and then had to discontinue exam due to degree of reflux aspiration plus mild to moderate direct aspiration during the swallow)    MBSImP Results:   Lip closure for intraoral bolus containment resulted in no labial escape.  Tongue control during bolus hold maintained a cohesive bolus held between tongue to palate seal. Bolus preparation and mastication solid not appropriate to give. Bolus transport/lingual motion was with brisk tongue motion. Oral residue was not observed. There was complete oral clearance. Initiation of the pharyngeal swallow occurred as the bolus head reached the pyriform sinuses. Soft palate elevation resulted in no bolus between the soft palate and the pharyngeal wall. Laryngeal elevation demonstrated partial superior movement of the thyroid cartilage with partial approximation of the arytenoids to the epiglottic petiole. Anterior hyoid excursion demonstrated partial anterior movement. Epiglottic movement resulted in partial inversion. Laryngeal vestibule closure was incomplete, as indicated by a narrow column of air or contrast within the laryngeal vestibule at the height of the swallow. Pharyngeal stripping wave was present but diminished. Pharyngeal contraction could not be determined due to logistical reasons not related to physiologic impairment. Pharyngoesophageal segment opening was completely distended for complete duration with no obstruction of bolus flow. Tongue base retraction allowed a trace collection of residue between the retracted tongue base and the posterior pharyngeal wall. A collection of residue remained within or on the pharyngeal structures Esophageal clearance in the upright position could not be fully evaluated however after swallowing tsp of nectar thick reflux through the UES resulting in aspiration was present.   Due to this study was discontinued    PENETRATION-ASPIRATION SCALE (PAS):  THIN 7 = Material enters the airway, passes below the vocal folds, and is not ejected from the trachea despite effort   MILDLY THICK 7 = Material enters the airway, passes below the vocal folds, and is not ejected from the trachea despite effort   MODERATELY THICK item not administered  PUREE item not administered  HARD SOLID item not administered       COMPENSATORY STRATEGIES    Compensatory strategies were not attempted      STRUCTURAL/FUNCTIONAL ANOMALIES   No structural/functional anomalies were noted    CERVICAL ESOPHAGEAL STAGE :     Redirection of bolus from the esophagus into pharynx           ___________    Cognition:   Within functional limits for this exam    Oral Peripheral Examination   Adequate lingual/labial strength     Current Respiratory Status   3 liters nasal cannula     Parameters of Speech Production  Respiration:  Adequate for speech production  Quality:   Breathy  Intensity: Quiet    Pain: No pain reported. EDUCATION:   The Speech Language Pathologist (SLP) completed education regarding results of evaluation and that intervention is warranted at this time. Learner: Patient  Education: Reviewed results and recommendations of this evaluation  Evaluation of Education:  Needs further instruction    This plan may be re-evaluated and revised as warranted. Evaluation Time includes thorough review of current medical information, gathering information on past medical history/social history and prior level of function, completion of standardized testing/informal observation of tasks, assessment of data and education on plan of care and goals. [x]The admitting diagnosis and active problem list, have been reviewed prior to initiation of this evaluation.     CPT Code: 58604  dysphagia study    ACTIVE PROBLEM LIST:   Patient Active Problem List   Diagnosis    Acute non-recurrent maxillary sinusitis    [facility] cell lung [facility] ([facility] [address]    Acute on chronic respiratory failure with hypoxia ([address]       Jessica Storm MSCCC/SLP  Speech Language Pathologist  [id number]

## 2021-06-23 NOTE — PROGRESS NOTES
General Surgery Progress Note    Patient doing well after port placement yesterday. No issues. Ok to use port when needed. No erythema or drainage. Surgery will sign off.     Electronically signed by Nicole Mccurdy MD on 6/23/21 at 7:53 AM EDT

## 2021-06-23 NOTE — ANESTHESIA PRE PROCEDURE
Department of Anesthesiology  Preprocedure Note       Name:  Cecilia Goodpasture   Age:  62 y.o.  :  1960                                          MRN:  [id number]         Date:  2021      Surgeon: Travis Davila):  Agatha Osler, MD    Procedure: Procedure(s):  EGD PEG TUBE PLACEMENT    Medications prior to admission:   Prior to Admission medications    Medication Sig Start Date End Date Taking? Authorizing Provider   acetaminophen (TYLENOL) 500 MG tablet Take 500 mg by mouth every 6 hours as needed for Pain    Historical Provider, MD   ondansetron (ZOFRAN-ODT) 4 MG disintegrating tablet Take 1 tablet by mouth every 8 hours as needed for Nausea or Vomiting 21   Boy Sevilla MD       Current medications:    No current facility-administered medications for this visit. No current outpatient medications on file.      Facility-Administered Medications Ordered in Other Visits   Medication Dose Route Frequency Provider Last Rate Last Admin    [START ON 2021] methylPREDNISolone sodium (SOLU-MEDROL) injection 40 mg  40 mg Intravenous Q12H Naveen Perez MD        guaiFENesin tablet 400 mg  400 mg Oral 4x Daily PRN Sarah Beth Nix MD   400 mg at 21 2133    cefTRIAXone (ROCEPHIN) 1,000 mg in sterile water 10 mL IV syringe  1,000 mg Intravenous Q24H Sarah Beth Nix MD   1,000 mg at 21 1504    doxycycline (VIBRAMYCIN) 100 mg in dextrose 5 % 100 mL IVPB  100 mg Intravenous Q12H Sarah Beth Nix MD   Stopped at 21 1610    sodium chloride flush 0.9 % injection 5-40 mL  5-40 mL Intravenous 2 times per day Sarah Beth Nix MD   10 mL at 21 2133    sodium chloride flush 0.9 % injection 5-40 mL  5-40 mL Intravenous PRN Sarah Beth Nix MD   10 mL at 21 1034    0.9 % sodium chloride infusion  25 mL Intravenous PRN Sarah Beth Nix MD        [Held by provider] enoxaparin (LOVENOX) injection 40 mg  40 mg Subcutaneous Daily Minna Flood MD   40 mg at 21 TONSILLECTOMY         Social History:    Social History     Tobacco Use    Smoking status: Current Every Day Smoker     Packs/day: 0.50     Start date: 6/18/1975    Smokeless tobacco: Never Used    Tobacco comment: was 1 pack daily   Substance Use Topics    Alcohol use: Not Currently     Alcohol/week: 25.0 standard drinks     Types: 25 Cans of beer per week     Comment: not currently                                Ready to quit: Not Answered  Counseling given: Not Answered  Comment: was 1 pack daily      Vital Signs (Current): There were no vitals filed for this visit. BP Readings from Last 3 Encounters:   06/23/21 116/73   06/22/21 (!) 92/57   06/17/21 105/66       NPO Status:                                                                                 BMI:   Wt Readings from Last 3 Encounters:   06/23/21 105 lb 9.6 oz (47.9 kg)   06/16/21 104 lb 11.2 oz (47.5 kg)   06/14/21 106 lb 3.2 oz (48.2 kg)     There is no height or weight on file to calculate BMI.    CBC:   Lab Results   Component Value Date    WBC 7.0 06/21/2021    RBC 4.15 06/21/2021    HGB 12.2 06/21/2021    HCT 37.9 06/21/2021    MCV 91.3 06/21/2021    RDW 14.7 06/21/2021     06/21/2021       CMP:   Lab Results   Component Value Date     06/21/2021    K 3.5 06/21/2021    CL 98 06/21/2021    CO2 33 06/21/2021    BUN 13 06/21/2021    CREATININE 0.4 06/21/2021    GFRAA >60 06/21/2021    LABGLOM >60 06/21/2021    GLUCOSE 125 06/21/2021    PROT 7.3 06/21/2021    CALCIUM 9.4 06/21/2021    BILITOT 0.5 06/21/2021    ALKPHOS 72 06/21/2021    AST 16 06/21/2021    ALT 8 06/21/2021       POC Tests: No results for input(s): POCGLU, POCNA, POCK, POCCL, POCBUN, POCHEMO, POCHCT in the last 72 hours.     Coags:   Lab Results   Component Value Date    PROTIME 13.5 06/22/2021    INR 1.2 06/22/2021       HCG (If Applicable): No results found for: PREGTESTUR, PREGSERUM, HCG, HCGQUANT     ABGs: No results found for: PHART, PO2ART, VNN3PLH, JDB6PXR, BEART, N1DIUXMP     Type & Screen (If Applicable):  No results found for: LABABO, LABRH    Drug/Infectious Status (If Applicable):  No results found for: HIV, HEPCAB    COVID-19 Screening (If Applicable):   Lab Results   Component Value Date    COVID19 Not Detected 06/20/2021           Anesthesia Evaluation  Patient summary reviewed no history of anesthetic complications:   Airway: Mallampati: II  TM distance: >3 FB   Neck ROM: full  Mouth opening: > = 3 FB Dental:          Pulmonary: breath sounds clear to auscultation  (+) COPD (Emphysema):  current smoker                          ROS comment: Lung mass  Acute on Chronic Respiratory Failure with Hypoxia - currently on O2 by nasal canula    CXR 6/21/2021:  Stable abnormal chest with the mediastinal and hilar adenopathy with soft  tissue mass in the right hilum concerning for malignancy.     Persistent bibasilar infiltrates and pleural effusion likely pneumonia or  edema. CT Chest 6/18/2021:  1. There is no evidence of a pulmonary embolus. 2. Moderate size right pleural effusion  3. Advanced emphysematous changes with superimposed pneumonia seen within the  left lower lobe and within the lingula.  This finding is new when compared  with the previous CT scan of 06/11/2021 and PET-CT scan of 06/16/2021.  4. Large right perihilar mass with extension into the mediastinum consistent  with the history of metastatic small cell lung CA  5. Significant right hilar, mediastinal core right paratracheal and supra and  infraclavicular lymphadenopathy. 6. Pleural metastasis.  Chronically distended esophagus secondary to the  metastatic lymphadenopathy within the inferior posterior mediastinum.        Cardiovascular:Negative CV ROS          ECG reviewed  Rhythm: regular  Rate: normal           Beta Blocker:  Not on Beta Blocker      ROS comment: EKG 6/2021:  Normal sinus rhythm Possible Left atrial enlargement Rightward axis Possible Anterior infarct (cited on or before 07-MAY-2021) Abnormal ECG When compared with ECG of 07-MAY-2021 10:30, No significant change was found Confirmed by Val Solorio ([id number]) on 6/18/2021 4:28:23 PM     Neuro/Psych:   (+) psychiatric history:            GI/Hepatic/Renal: Neg GI/Hepatic/Renal ROS            Endo/Other:    (+) malignancy/cancer (Small cell lung CA). Abdominal:           Vascular: negative vascular ROS. Anesthesia Plan      MAC     ASA 4       Induction: intravenous. Anesthetic plan and risks discussed with patient. Lance Costello MD   6/23/2021    DOS STAFF ADDENDUM:    Patient seen and examined, physical exam updated as needed, chart reviewed. NPO status confirmed. Anesthetic options and risks discussed with patient/legal guardian. Patient/legal guardian verbalized understanding and agrees to proceed. Lance Costello MD  Staff Anesthesiologist  June 23, 2021  5:03 PM              NO CHANGES.

## 2021-06-24 ENCOUNTER — ANESTHESIA (OUTPATIENT)
Dept: ENDOSCOPY | Age: 61
DRG: 951 | End: 2021-06-24
Payer: COMMERCIAL

## 2021-06-24 ENCOUNTER — ANESTHESIA EVENT (OUTPATIENT)
Dept: OPERATING ROOM | Age: 61
DRG: 951 | End: 2021-06-24
Payer: COMMERCIAL

## 2021-06-24 VITALS
RESPIRATION RATE: 19 BRPM | SYSTOLIC BLOOD PRESSURE: 113 MMHG | OXYGEN SATURATION: 94 % | DIASTOLIC BLOOD PRESSURE: 69 MMHG

## 2021-06-24 PROCEDURE — 94640 AIRWAY INHALATION TREATMENT: CPT

## 2021-06-24 PROCEDURE — 7100000010 HC PHASE II RECOVERY - FIRST 15 MIN: Performed by: SURGERY

## 2021-06-24 PROCEDURE — 3609017100 HC EGD: Performed by: SURGERY

## 2021-06-24 PROCEDURE — 6370000000 HC RX 637 (ALT 250 FOR IP): Performed by: STUDENT IN AN ORGANIZED HEALTH CARE EDUCATION/TRAINING PROGRAM

## 2021-06-24 PROCEDURE — 2580000003 HC RX 258: Performed by: STUDENT IN AN ORGANIZED HEALTH CARE EDUCATION/TRAINING PROGRAM

## 2021-06-24 PROCEDURE — 3700000000 HC ANESTHESIA ATTENDED CARE: Performed by: SURGERY

## 2021-06-24 PROCEDURE — 94668 MNPJ CHEST WALL SBSQ: CPT

## 2021-06-24 PROCEDURE — 6360000002 HC RX W HCPCS: Performed by: NURSE ANESTHETIST, CERTIFIED REGISTERED

## 2021-06-24 PROCEDURE — 1200000000 HC SEMI PRIVATE

## 2021-06-24 PROCEDURE — 2580000003 HC RX 258: Performed by: SURGERY

## 2021-06-24 PROCEDURE — 6360000002 HC RX W HCPCS: Performed by: SURGERY

## 2021-06-24 PROCEDURE — 2500000003 HC RX 250 WO HCPCS: Performed by: NURSE ANESTHETIST, CERTIFIED REGISTERED

## 2021-06-24 PROCEDURE — 7100000011 HC PHASE II RECOVERY - ADDTL 15 MIN: Performed by: SURGERY

## 2021-06-24 PROCEDURE — 43235 EGD DIAGNOSTIC BRUSH WASH: CPT | Performed by: SURGERY

## 2021-06-24 PROCEDURE — 99233 SBSQ HOSP IP/OBS HIGH 50: CPT | Performed by: INTERNAL MEDICINE

## 2021-06-24 PROCEDURE — 6360000002 HC RX W HCPCS: Performed by: STUDENT IN AN ORGANIZED HEALTH CARE EDUCATION/TRAINING PROGRAM

## 2021-06-24 PROCEDURE — 2580000003 HC RX 258: Performed by: NURSE ANESTHETIST, CERTIFIED REGISTERED

## 2021-06-24 PROCEDURE — 99232 SBSQ HOSP IP/OBS MODERATE 35: CPT | Performed by: STUDENT IN AN ORGANIZED HEALTH CARE EDUCATION/TRAINING PROGRAM

## 2021-06-24 PROCEDURE — 3700000001 HC ADD 15 MINUTES (ANESTHESIA): Performed by: SURGERY

## 2021-06-24 PROCEDURE — 0DJ08ZZ INSPECTION OF UPPER INTESTINAL TRACT, VIA NATURAL OR ARTIFICIAL OPENING ENDOSCOPIC: ICD-10-PCS | Performed by: SURGERY

## 2021-06-24 PROCEDURE — 2500000003 HC RX 250 WO HCPCS: Performed by: STUDENT IN AN ORGANIZED HEALTH CARE EDUCATION/TRAINING PROGRAM

## 2021-06-24 PROCEDURE — 2709999900 HC NON-CHARGEABLE SUPPLY: Performed by: SURGERY

## 2021-06-24 PROCEDURE — 2700000000 HC OXYGEN THERAPY PER DAY

## 2021-06-24 RX ORDER — MORPHINE SULFATE 2 MG/ML
1 INJECTION, SOLUTION INTRAMUSCULAR; INTRAVENOUS EVERY 5 MIN PRN
Status: DISCONTINUED | OUTPATIENT
Start: 2021-06-24 | End: 2021-06-24 | Stop reason: HOSPADM

## 2021-06-24 RX ORDER — MORPHINE SULFATE 2 MG/ML
2 INJECTION, SOLUTION INTRAMUSCULAR; INTRAVENOUS EVERY 5 MIN PRN
Status: DISCONTINUED | OUTPATIENT
Start: 2021-06-24 | End: 2021-06-24 | Stop reason: HOSPADM

## 2021-06-24 RX ORDER — HYDROCODONE BITARTRATE AND ACETAMINOPHEN 5; 325 MG/1; MG/1
2 TABLET ORAL PRN
Status: DISCONTINUED | OUTPATIENT
Start: 2021-06-24 | End: 2021-06-24 | Stop reason: HOSPADM

## 2021-06-24 RX ORDER — PROMETHAZINE HYDROCHLORIDE 25 MG/ML
6.25 INJECTION, SOLUTION INTRAMUSCULAR; INTRAVENOUS EVERY 10 MIN PRN
Status: DISCONTINUED | OUTPATIENT
Start: 2021-06-24 | End: 2021-06-24 | Stop reason: HOSPADM

## 2021-06-24 RX ORDER — PROPOFOL 10 MG/ML
INJECTION, EMULSION INTRAVENOUS PRN
Status: DISCONTINUED | OUTPATIENT
Start: 2021-06-24 | End: 2021-06-24 | Stop reason: SDUPTHER

## 2021-06-24 RX ORDER — SODIUM CHLORIDE 9 MG/ML
INJECTION, SOLUTION INTRAVENOUS CONTINUOUS PRN
Status: DISCONTINUED | OUTPATIENT
Start: 2021-06-24 | End: 2021-06-24 | Stop reason: SDUPTHER

## 2021-06-24 RX ORDER — METHYLPREDNISOLONE SODIUM SUCCINATE 40 MG/ML
40 INJECTION, POWDER, LYOPHILIZED, FOR SOLUTION INTRAMUSCULAR; INTRAVENOUS DAILY
Status: DISCONTINUED | OUTPATIENT
Start: 2021-06-25 | End: 2021-07-02 | Stop reason: HOSPADM

## 2021-06-24 RX ORDER — MEPERIDINE HYDROCHLORIDE 25 MG/ML
12.5 INJECTION INTRAMUSCULAR; INTRAVENOUS; SUBCUTANEOUS EVERY 5 MIN PRN
Status: DISCONTINUED | OUTPATIENT
Start: 2021-06-24 | End: 2021-06-24 | Stop reason: HOSPADM

## 2021-06-24 RX ORDER — LIDOCAINE HYDROCHLORIDE 10 MG/ML
INJECTION, SOLUTION EPIDURAL; INFILTRATION; INTRACAUDAL; PERINEURAL PRN
Status: DISCONTINUED | OUTPATIENT
Start: 2021-06-24 | End: 2021-06-24 | Stop reason: SDUPTHER

## 2021-06-24 RX ORDER — HYDROCODONE BITARTRATE AND ACETAMINOPHEN 5; 325 MG/1; MG/1
1 TABLET ORAL PRN
Status: DISCONTINUED | OUTPATIENT
Start: 2021-06-24 | End: 2021-06-24 | Stop reason: HOSPADM

## 2021-06-24 RX ADMIN — IPRATROPIUM BROMIDE AND ALBUTEROL SULFATE 1 AMPULE: 2.5; .5 SOLUTION RESPIRATORY (INHALATION) at 08:33

## 2021-06-24 RX ADMIN — LIDOCAINE HYDROCHLORIDE 20 MG: 10 INJECTION, SOLUTION EPIDURAL; INFILTRATION; INTRACAUDAL; PERINEURAL at 13:05

## 2021-06-24 RX ADMIN — PROPOFOL 50 MG: 10 INJECTION, EMULSION INTRAVENOUS at 13:05

## 2021-06-24 RX ADMIN — CEFAZOLIN 1000 MG: 1 INJECTION, POWDER, FOR SOLUTION INTRAMUSCULAR; INTRAVENOUS at 11:31

## 2021-06-24 RX ADMIN — METHYLPREDNISOLONE SODIUM SUCCINATE 40 MG: 40 INJECTION, POWDER, LYOPHILIZED, FOR SOLUTION INTRAMUSCULAR; INTRAVENOUS at 02:16

## 2021-06-24 RX ADMIN — Medication 10 ML: at 08:16

## 2021-06-24 RX ADMIN — PROPOFOL 50 MG: 10 INJECTION, EMULSION INTRAVENOUS at 13:08

## 2021-06-24 RX ADMIN — Medication 10 ML: at 19:52

## 2021-06-24 RX ADMIN — SODIUM CHLORIDE: 9 INJECTION, SOLUTION INTRAVENOUS at 12:46

## 2021-06-24 RX ADMIN — DOXYCYCLINE 100 MG: 100 INJECTION, POWDER, LYOPHILIZED, FOR SOLUTION INTRAVENOUS at 15:42

## 2021-06-24 RX ADMIN — IPRATROPIUM BROMIDE AND ALBUTEROL SULFATE 1 AMPULE: 2.5; .5 SOLUTION RESPIRATORY (INHALATION) at 19:32

## 2021-06-24 RX ADMIN — WATER 1000 MG: 1 INJECTION INTRAMUSCULAR; INTRAVENOUS; SUBCUTANEOUS at 11:32

## 2021-06-24 RX ADMIN — IPRATROPIUM BROMIDE AND ALBUTEROL SULFATE 1 AMPULE: 2.5; .5 SOLUTION RESPIRATORY (INHALATION) at 15:57

## 2021-06-24 RX ADMIN — DOXYCYCLINE 100 MG: 100 INJECTION, POWDER, LYOPHILIZED, FOR SOLUTION INTRAVENOUS at 02:19

## 2021-06-24 ASSESSMENT — PAIN DESCRIPTION - PAIN TYPE
TYPE: SURGICAL PAIN
TYPE: SURGICAL PAIN

## 2021-06-24 ASSESSMENT — PAIN SCALES - GENERAL
PAINLEVEL_OUTOF10: 0

## 2021-06-24 ASSESSMENT — LIFESTYLE VARIABLES: SMOKING_STATUS: 1

## 2021-06-24 NOTE — ANESTHESIA POSTPROCEDURE EVALUATION
Department of Anesthesiology  Postprocedure Note    Patient: Jocelyn Beckwith  MRN: [id number]  YOB: 1960  Date of evaluation: 6/24/2021  Time:  1:22 PM     Procedure Summary     Date: 06/24/21 Room / Location: [place] [address] / [facility]    Anesthesia Start:  Anesthesia Stop:     Procedure: EGD PEG TUBE PLACEMENT (N/A ) Diagnosis: (/)    Surgeons: Jason Foster MD Responsible Provider:     Anesthesia Type: MAC ASA Status: 4          Anesthesia Type: MAC    Jeremy Phase I: Jeremy Score: 10    Jeremy Phase II: Jeremy Score: 8    Last vitals: Reviewed and per EMR flowsheets.        Anesthesia Post Evaluation    Patient location during evaluation: PACU  Patient participation: complete - patient participated  Level of consciousness: awake  Pain score: 3  Airway patency: patent  Nausea & Vomiting: no nausea and no vomiting  Complications: no  Cardiovascular status: blood pressure returned to baseline  Respiratory status: acceptable  Hydration status: euvolemic

## 2021-06-24 NOTE — PROGRESS NOTES
respiratory distress  Cardiovascular: normal rate, normal S1 and S2 and no carotid bruits  Abdomen: soft, non-tender, non-distended, normal bowel sounds, no masses or organomegaly  Extremities: no cyanosis, no clubbing and no edema  Neurologic: no cranial nerve deficit and speech normal        No results for input(s): NA, K, CL, CO2, BUN, CREATININE, GLUCOSE, CALCIUM in the last 72 hours. No results for input(s): WBC, RBC, HGB, HCT, MCV, MCH, MCHC, RDW, PLT, MPV in the last 72 hours. Micro:  No components found for: [facility])    Radiology:   No results found. Assessment:    Active Problems:    Acute on chronic respiratory failure with hypoxia (HCC)  Resolved Problems:    * No resolved hospital problems. *      Plan:  1. Acute on Chronic Hypoxic  Respiratory Failure with underlying lung cancer, emphysematous changes, right pleural effusion -respiratory panel negative, continue respiratory support with oxygen and nebs. On Vest therapy. pulmonary and Oncology consulted. Status post right subclavian Mediport insertion 6/22. 2.  Small Cell Lung Cancer - Oncology consult. 3.  Tobacco Dependence - Nicoderm 21 mg patch daily. 4.  Hoarseness - nystatin suspension qid.   5.  Acute COPD exacerbation with possible postobstructive pneumonia/aspiration pneumonia- IR guided thoracocentesis 5/22, on IV Rocephin and doxy. On IV steroids wean as tolerated, incentive spirometry. 6.  Dysphagia - secondary to mediastinal lymphadenopathy, likely distended esophagus, general surgery on board, EGD was attempted 6/24 for PEG placement but extrinsic compression on the esophagus prevented the scope advancement, patient will need open gastrostomy tube placement in OR tomorrow per general surgery. Consulted speech, failed swallow study. DVT prophylaxis - Lovenox  Social - will need home oxygen and home health care on discharge. NOTE: This report was transcribed using voice recognition software.  Every effort was made to ensure accuracy; however, inadvertent computerized transcription errors may be present.   Electronically signed by Parish Saucedo MD on 6/24/2021 at 3:35 PM

## 2021-06-24 NOTE — PLAN OF CARE
Problem: Falls - Risk of:  Goal: Will remain free from falls  Description: Will remain free from falls  Outcome: Ongoing  Goal: Absence of physical injury  Description: Absence of physical injury  Outcome: Ongoing     Problem: Pain:  Goal: Pain level will decrease  Description: Pain level will decrease  Outcome: Ongoing  Goal: Control of acute pain  Description: Control of acute pain  Outcome: Ongoing  Goal: Control of chronic pain  Description: Control of chronic pain  Outcome: Ongoing     Problem: FLUID AND ELECTROLYTE IMBALANCE  Goal: Fluid and electrolyte balance are achieved/maintained  Outcome: Ongoing     Problem: Gas Exchange - Impaired  Goal: Able to breathe comfortably  Description: Able to breathe comfortably  Outcome: Ongoing

## 2021-06-24 NOTE — CARE COORDINATION
Social Work / Discharge Planning : SW followed up with patient and provided update that [facility] is on board for [facility]. SW explained process for TF and patient expressed understanding. SW spoke to luiza at [facility] and they are waiting on dietary recommendation if and when TF is placed. Patient insurance will cover and patient will need supplies sent home at discharge. Patient wanting to go home today but expressed an understanding that TF if placed needs run and need to monitor if he can tolerate recommended formula. Will need C orders. Patient still on 02 and waiting if needed at discharge. SW to follow.  Electronically signed by ZANDRA Amato on 6/24/21 at 9:31 AM EDT

## 2021-06-24 NOTE — PROGRESS NOTES
ntf dr Álvaro west via alshaun rn of need for transfer orders  No family here per lashaun rn;verified with [facility] that pt visible on monitor  [phone] report called to porter on 5th floor

## 2021-06-24 NOTE — ANESTHESIA PRE PROCEDURE
Department of Anesthesiology  Preprocedure Note       Name:  Dean Richardson   Age:  62 y.o.  :  1960                                          MRN:  [id number]         Date:  2021      Surgeon: Zahida Bonilla):  Nu Mayorga MD    Procedure: Procedure(s):  OPEN GASTROSTOMY TUBE    Medications prior to admission:   Prior to Admission medications    Medication Sig Start Date End Date Taking?  Authorizing Provider   acetaminophen (TYLENOL) 500 MG tablet Take 500 mg by mouth every 6 hours as needed for Pain   Yes Historical Provider, MD   ondansetron (ZOFRAN-ODT) 4 MG disintegrating tablet Take 1 tablet by mouth every 8 hours as needed for Nausea or Vomiting 21  Yes Georgianna Goodell, MD       Current medications:    Current Facility-Administered Medications   Medication Dose Route Frequency Provider Last Rate Last Admin    [START ON 2021] methylPREDNISolone sodium (SOLU-MEDROL) injection 40 mg  40 mg Intravenous Daily Amol Aburto MD        guaiFENesin tablet 400 mg  400 mg Oral 4x Daily PRN Natalia Munoz MD   400 mg at 21 2133    cefTRIAXone (ROCEPHIN) 1,000 mg in sterile water 10 mL IV syringe  1,000 mg Intravenous Q24H Natalia Munoz MD   1,000 mg at 21 1132    doxycycline (VIBRAMYCIN) 100 mg in dextrose 5 % 100 mL IVPB  100 mg Intravenous Q12H Natalia Munoz MD   Stopped at 21 0259    sodium chloride flush 0.9 % injection 5-40 mL  5-40 mL Intravenous 2 times per day Natalia Munoz MD   10 mL at 21 0816    sodium chloride flush 0.9 % injection 5-40 mL  5-40 mL Intravenous PRN Natalia Munoz MD   10 mL at 21 1034    0.9 % sodium chloride infusion  25 mL Intravenous PRN Natalia Munoz MD        [Held by provider] enoxaparin (LOVENOX) injection 40 mg  40 mg Subcutaneous Daily Sonya Zarate MD   40 mg at 21 1641    ondansetron (ZOFRAN-ODT) disintegrating tablet 4 mg  4 mg Oral Q8H PRN Natalia Munoz MD   4 mg at 21 7517 Or    ondansetron (ZOFRAN) injection 4 mg  4 mg Intravenous Q6H PRN Mary Bennett MD        polyethylene glycol [facility]) packet 17 g  17 g Oral Daily PRN Mary Bennett MD        acetaminophen (TYLENOL) tablet 650 mg  650 mg Oral Q6H PRN Mary Bennett MD        Or    acetaminophen (TYLENOL) suppository 650 mg  650 mg Rectal Q6H PRN Mary Bennett MD        famotidine (PEPCID) tablet 20 mg  20 mg Oral BID Mary Bennett MD   20 mg at 06/22/21 2132    ipratropium-albuterol (DUONEB) nebulizer solution 1 ampule  1 ampule Inhalation Q4H WA Mary Bennett MD   1 ampule at 06/24/21 5414    nystatin (MYCOSTATIN) 994455 UNIT/ML suspension 500,000 Units  5 mL Oral 4x Daily Mary Bennett MD   500,000 Units at 06/22/21 2133    nicotine (NICODERM CQ) 21 MG/24HR 1 patch  1 patch Transdermal Daily Mary Bennett MD   1 patch at 06/23/21 1835       Allergies:  No Known Allergies    Problem List:    Patient Active Problem List   Diagnosis Code    Acute non-recurrent maxillary sinusitis J01.00    Small cell lung cancer ([facility] C34.90    Acute on chronic respiratory failure with hypoxia (HCC) J96.21       Past Medical History:        Diagnosis Date    Alcohol abuse     Cancer ([facility]     Lung    Lung mass     right    Seasonal allergies     Tobacco abuse        Past Surgical History:        Procedure Laterality Date    BRONCHOSCOPY N/A 6/3/2021    BRONCHOSCOPY W/EBUS FNA performed by Rossi Balderas MD at [address] [place] [address] N/A 6/3/2021    BRONCHOSCOPY performed by Rossi Balderas MD at [address] [facility] N/A 6/22/2021    MEDI PORT INSERTION performed by Ana Reyes MD at [place] [address] Right 5/17/2021    RIGHT THORACENTESIS ULTRASOUND performed by Rossi Balderas MD at [address] [place] [address]         Social History:    Social History     Tobacco Use    Smoking status: Current Every Day Smoker     Packs/day: 0.50 Start date: 6/18/1975    Smokeless tobacco: Never Used    Tobacco comment: was 1 pack daily   Substance Use Topics    Alcohol use: Not Currently     Alcohol/week: 25.0 standard drinks     Types: 25 Cans of beer per week     Comment: not currently                                Ready to quit: Not Answered  Counseling given: Not Answered  Comment: was 1 pack daily      Vital Signs (Current):   Vitals:    06/24/21 0800 06/24/21 1319 06/24/21 1330 06/24/21 1345   BP: 94/66 82/60 96/70 100/70   Pulse: 98 98 93 92   Resp: 20 20 18 20   Temp: 36.5 °C (97.7 °F) 36.3 °C (97.4 °F)  36.8 °C (98.3 °F)   TempSrc: Oral Temporal  Temporal   SpO2: 98% 93% 99% 98%   Weight:       Height:                                                  BP Readings from Last 3 Encounters:   06/24/21 100/70   06/22/21 (!) 92/57   06/17/21 105/66       NPO Status: Time of last liquid consumption: 0001                        Time of last solid consumption: 0001                        Date of last liquid consumption: 06/24/21                        Date of last solid food consumption: 06/24/21    BMI:   Wt Readings from Last 3 Encounters:   06/23/21 105 lb 9.6 oz (47.9 kg)   06/16/21 104 lb 11.2 oz (47.5 kg)   06/14/21 106 lb 3.2 oz (48.2 kg)     Body mass index is 16.54 kg/m².     CBC:   Lab Results   Component Value Date    WBC 7.0 06/21/2021    RBC 4.15 06/21/2021    HGB 12.2 06/21/2021    HCT 37.9 06/21/2021    MCV 91.3 06/21/2021    RDW 14.7 06/21/2021     06/21/2021       CMP:   Lab Results   Component Value Date     06/21/2021    K 3.5 06/21/2021    CL 98 06/21/2021    CO2 33 06/21/2021    BUN 13 06/21/2021    CREATININE 0.4 06/21/2021    GFRAA >60 06/21/2021    LABGLOM >60 06/21/2021    GLUCOSE 125 06/21/2021    PROT 7.3 06/21/2021    CALCIUM 9.4 06/21/2021    BILITOT 0.5 06/21/2021    ALKPHOS 72 06/21/2021    AST 16 06/21/2021    ALT 8 06/21/2021       POC Tests: No results for input(s): POCGLU, POCNA, POCK, POCCL, POCBUN, Chaya Collins in the last 72 hours. Coags:   Lab Results   Component Value Date    PROTIME 13.5 06/22/2021    INR 1.2 06/22/2021       HCG (If Applicable): No results found for: PREGTESTUR, PREGSERUM, HCG, HCGQUANT     ABGs: No results found for: PHART, PO2ART, SHU9FBX, END5AUK, BEART, A1ICIJTX     Type & Screen (If Applicable):  No results found for: LABABO, LABRH    Drug/Infectious Status (If Applicable):  No results found for: HIV, HEPCAB    COVID-19 Screening (If Applicable):   Lab Results   Component Value Date    COVID19 Not Detected 06/20/2021       CXR 6/21/21  Stable abnormal chest with the mediastinal and hilar adenopathy with soft   tissue mass in the right hilum concerning for malignancy.       Persistent bibasilar infiltrates and pleural effusion likely pneumonia or   edema. EKG 6/21/21  Normal sinus rhythm  Possible Left atrial enlargement  Rightward axis  Possible Anterior infarct (cited on or before 07-MAY-2021)  Abnormal ECG  When compared with ECG of 07-MAY-2021 10:30,  No significant change was found  Confirmed by Ghada Yost ([id number]) on 6/18/2021 4:28:23 PM    CTA Chest 6/18/21  1. There is no evidence of a pulmonary embolus. 2. Moderate size right pleural effusion   3. Advanced emphysematous changes with superimposed pneumonia seen within the   left lower lobe and within the lingula.  This finding is new when compared   with the previous CT scan of 06/11/2021 and PET-CT scan of 06/16/2021.   4. Large right perihilar mass with extension into the mediastinum consistent   with the history of metastatic small cell lung CA   5. Significant right hilar, mediastinal core right paratracheal and supra and   infraclavicular lymphadenopathy. 6. Pleural metastasis.  Chronically distended esophagus secondary to the   metastatic lymphadenopathy within the inferior posterior mediastinum.        Anesthesia Evaluation  Patient summary reviewed and Nursing notes reviewed no history of anesthetic complications:   Airway: Mallampati: II  TM distance: >3 FB   Neck ROM: full  Mouth opening: > = 3 FB Dental:          Pulmonary:   (+) COPD: severe,  shortness of breath: chronic,  decreased breath sounds,  current smoker          Patient did not smoke on day of surgery. ROS comment: Acute on chronic respiratory failure with hypoxia, Emphysema, large amount of phlegm/cough per patient   Cardiovascular:    (+) REESE:,     (-) past MI and CAD    ECG reviewed  Rhythm: regular  Rate: normal           Beta Blocker:  Not on Beta Blocker         Neuro/Psych:   (+) psychiatric history:            GI/Hepatic/Renal:        (-) liver disease and no renal disease      ROS comment: dysphagia. Endo/Other:    (+) malignancy/cancer ( right lung mass and lung CA ). ROS comment: Alcohol abuse Abdominal:   (+) scaphoid        Vascular: negative vascular ROS. Anesthesia Plan      general     ASA 4     (Right chest venous port)  Induction: intravenous. BIS    Anesthetic plan and risks discussed with patient. Use of blood products discussed with patient whom consented to blood products. Plan discussed with attending and CRNA. Paola Jaquez RN   6/24/2021    DOS STAFF ADDENDUM:    Pt seen and examined, chart reviewed (including anesthesia, drug and allergy history). Anesthetic plan, risks, benefits, alternatives, and personnel involved discussed with patient. Patient verbalized an understanding and agrees to proceed. Plan discussed with care team members and agreed upon.     Sebastian Victoria MD  Staff Anesthesiologist  6:40 AM

## 2021-06-24 NOTE — PROGRESS NOTES
Speech Language Pathology  Pt WIL for procedure, will f/u as per POC as clinically indicated.     Nelda Peguero, [address] [facility] [place] [address]

## 2021-06-24 NOTE — OP NOTE
Operative Note    Jennifer Tim     DATE OF PROCEDURE: 6/24/2021  SURGEON: Dr. Jairon Damico MD, M.D. PREOPERATIVE DIAGNOSES:  Dysphagia    POSTOPERATIVE DIAGNOSES: Distal esophageal obstruction     OPERATION: Esophagoscopy     ANESTHESIA: LMAC           Complications: none    BLOOD LOSS: Minimal    OPERATIONS: The patient was placed on the table and sedated by anesthesia. Bite block was placed. A lubricated scope was easily passed into the upper esophagus which looked normal. The distal esophagus looked abnormal: extrinsic compression on the esophagus prevented the scope from being advanced any further. A PEG cannot be placed. The scope was then withdrawn. The patient tolerated the procedure well. PLAN: Plan for open gastrostomy tube in the OR tomorrow morning. Physician Signature: Electronically signed by Dr. Jairon Damico MD M.D. FACS    Send copy of H&P to PCP, Manju Chávez DO and referring physician, No ref.  provider found

## 2021-06-24 NOTE — PROGRESS NOTES
Failed PEG placement. Will proceed with open gastrostomy tube placement tomorrow morning.     Reza Foster MD

## 2021-06-24 NOTE — PROGRESS NOTES
Inpatient Medical Oncology Progress Note    Subjective:  No fever. Failed PEG placement. Will proceed with open gastrostomy tube placement tomorrow morning    Objective:  /67   Pulse 96   Temp 98.1 °F (36.7 °C) (Oral)   Resp 18   Ht 5' 7\" (1.702 m)   Wt 105 lb 9.6 oz (47.9 kg)   SpO2 98%   BMI 16.54 kg/m²   GENERAL: Alert, oriented x 3, not in acute distress. HEENT: PERRLA; EOMI. Oropharynx clear. NECK: Supple. Without lymphadenopathy. LUNGS: Fair air entry  CARDIOVASCULAR: Regular rate. No murmurs, rubs or gallops. ABDOMEN: Soft. Non-tender, non-distended. Positive bowel sounds. EXTREMITIES: Without clubbing, cyanosis, or edema. NEUROLOGIC: No focal deficits.    ECOG PS 1    Diagnostics:  Lab Results   Component Value Date    WBC 7.0 06/21/2021    HGB 12.2 (L) 06/21/2021    HCT 37.9 06/21/2021    MCV 91.3 06/21/2021     06/21/2021     Lab Results   Component Value Date     06/21/2021    K 3.5 06/21/2021    CL 98 06/21/2021    CO2 33 (H) 06/21/2021    BUN 13 06/21/2021    CREATININE 0.4 (L) 06/21/2021    GLUCOSE 125 (H) 06/21/2021    CALCIUM 9.4 06/21/2021    PROT 7.3 06/21/2021    LABALBU 3.4 (L) 06/21/2021    BILITOT 0.5 06/21/2021    ALKPHOS 72 06/21/2021    AST 16 06/21/2021    ALT 8 06/21/2021    LABGLOM >60 06/21/2021    GFRAA >60 06/21/2021     Impression/Plan:  62 y.o male with Extensive SCLC     PET/CT on 06/15/2021 Mass centered in the right hilar region extending into the mediastinum consistent with history of small cell lung cancer with metabolically active metastatic mediastinal lymphadenopathy extending into the bilateral supraclavicular region and inferiorly into the retrocrural region.   Metabolically active right pleural metastases with corresponding pleural effusion  Metabolically active nodules in the skin of the abdomen are concerning for metastatic disease.   The esophagus is distended which appears to be secondary to compression by lower mediastinal lymphadenopathy.   Intense activity in the collapsed right lower lobe could be due to additional malignancy or postobstructive pneumonia.      Extensive SCLC, Recommended systemic therapy consisting of Carboplatin + Etoposide + Tecentriq. Side effects reviewed. He agreed to proceed.   Cycle # 1 Carboplatin + Etoposide + Tecentriq was on 06/16/2021  He had presented to the hospital on 06/18/2021 for worsening shortness of breath. CTA chest noted no PE. Moderate right pleural effusion  Advanced emphysematous changes with superimposed pneumonia seen within the with the previous CT scan of 06/11/2021 and PET-CT scan of 06/16/2021  Large right perihilar mass with extension into the mediastinum  Significant right hilar mediastinal core right paratracheal and supra and infraclavicular LN  Pleural metastasis. Chronically distended esophagus   The patient was admitted with acute COPD exacerbation, possible postobstructive pneumonia  The patient is doing better clinically. Pulmonary team on board  Vest treatments to facilitate bronchopulmonary clearance  IR guided right thoracentesis 06/22/2021; A total of 1200 cc of pleural effusion was removed  Continue IV steroids, nebs. Continue antibiotics, on IV ceftriaxone and doxycycline  Continue to monitor his CBCD. Right subclavian Mediport 06/22/2021  Consulted speech, failed swallow study. EGD was attempted 6/24 for PEG placement but extrinsic compression on the esophagus prevented the scope advancement, patient will need open gastrostomy tube  Will continue to follow.     06/24/2021  Paco Wagner MD

## 2021-06-25 ENCOUNTER — APPOINTMENT (OUTPATIENT)
Dept: CT IMAGING | Age: 61
DRG: 951 | End: 2021-06-25
Payer: COMMERCIAL

## 2021-06-25 ENCOUNTER — ANESTHESIA (OUTPATIENT)
Dept: OPERATING ROOM | Age: 61
DRG: 951 | End: 2021-06-25
Payer: COMMERCIAL

## 2021-06-25 VITALS — SYSTOLIC BLOOD PRESSURE: 101 MMHG | OXYGEN SATURATION: 95 % | DIASTOLIC BLOOD PRESSURE: 70 MMHG

## 2021-06-25 PROBLEM — E43 SEVERE PROTEIN-CALORIE MALNUTRITION (HCC): Chronic | Status: ACTIVE | Noted: 2021-06-25

## 2021-06-25 LAB
ALBUMIN SERPL-MCNC: 2.9 G/DL (ref 3.5–5.2)
ALP BLD-CCNC: 62 U/L (ref 40–129)
ALT SERPL-CCNC: 8 U/L (ref 0–40)
ANION GAP SERPL CALCULATED.3IONS-SCNC: 6 MMOL/L (ref 7–16)
AST SERPL-CCNC: 17 U/L (ref 0–39)
BASOPHILS ABSOLUTE: 0.01 E9/L (ref 0–0.2)
BASOPHILS RELATIVE PERCENT: 0.4 % (ref 0–2)
BILIRUB SERPL-MCNC: 0.5 MG/DL (ref 0–1.2)
BUN BLDV-MCNC: 19 MG/DL (ref 6–20)
CALCIUM SERPL-MCNC: 8 MG/DL (ref 8.6–10.2)
CHLORIDE BLD-SCNC: 107 MMOL/L (ref 98–107)
CO2: 27 MMOL/L (ref 22–29)
CREAT SERPL-MCNC: 0.4 MG/DL (ref 0.7–1.2)
EKG ATRIAL RATE: 152 BPM
EKG P-R INTERVAL: 152 MS
EKG Q-T INTERVAL: 246 MS
EKG QRS DURATION: 98 MS
EKG QTC CALCULATION (BAZETT): 391 MS
EKG R AXIS: 129 DEGREES
EKG T AXIS: -76 DEGREES
EKG VENTRICULAR RATE: 152 BPM
EOSINOPHILS ABSOLUTE: 0 E9/L (ref 0.05–0.5)
EOSINOPHILS RELATIVE PERCENT: 0 % (ref 0–6)
GFR AFRICAN AMERICAN: >60
GFR NON-AFRICAN AMERICAN: >60 ML/MIN/1.73
GLUCOSE BLD-MCNC: 94 MG/DL (ref 74–99)
HCT VFR BLD CALC: 36.2 % (ref 37–54)
HEMOGLOBIN: 11.3 G/DL (ref 12.5–16.5)
IMMATURE GRANULOCYTES #: 0.02 E9/L
IMMATURE GRANULOCYTES %: 0.9 % (ref 0–5)
LACTIC ACID: 1.1 MMOL/L (ref 0.5–2.2)
LYMPHOCYTES ABSOLUTE: 0.3 E9/L (ref 1.5–4)
LYMPHOCYTES RELATIVE PERCENT: 13 % (ref 20–42)
MCH RBC QN AUTO: 28.3 PG (ref 26–35)
MCHC RBC AUTO-ENTMCNC: 31.2 % (ref 32–34.5)
MCV RBC AUTO: 90.7 FL (ref 80–99.9)
MONOCYTES ABSOLUTE: 0.09 E9/L (ref 0.1–0.95)
MONOCYTES RELATIVE PERCENT: 3.9 % (ref 2–12)
NEUTROPHILS ABSOLUTE: 1.88 E9/L (ref 1.8–7.3)
NEUTROPHILS RELATIVE PERCENT: 81.8 % (ref 43–80)
PDW BLD-RTO: 14.5 FL (ref 11.5–15)
PLATELET # BLD: 90 E9/L (ref 130–450)
PLATELET CONFIRMATION: NORMAL
PMV BLD AUTO: 8.3 FL (ref 7–12)
POTASSIUM REFLEX MAGNESIUM: 4.4 MMOL/L (ref 3.5–5)
RBC # BLD: 3.99 E12/L (ref 3.8–5.8)
RBC # BLD: NORMAL 10*6/UL
SODIUM BLD-SCNC: 140 MMOL/L (ref 132–146)
TOTAL PROTEIN: 6.5 G/DL (ref 6.4–8.3)
WBC # BLD: 2.3 E9/L (ref 4.5–11.5)

## 2021-06-25 PROCEDURE — P9047 ALBUMIN (HUMAN), 25%, 50ML: HCPCS | Performed by: INTERNAL MEDICINE

## 2021-06-25 PROCEDURE — 2580000003 HC RX 258: Performed by: STUDENT IN AN ORGANIZED HEALTH CARE EDUCATION/TRAINING PROGRAM

## 2021-06-25 PROCEDURE — 6370000000 HC RX 637 (ALT 250 FOR IP): Performed by: ANESTHESIOLOGY

## 2021-06-25 PROCEDURE — 6360000002 HC RX W HCPCS

## 2021-06-25 PROCEDURE — 6370000000 HC RX 637 (ALT 250 FOR IP): Performed by: STUDENT IN AN ORGANIZED HEALTH CARE EDUCATION/TRAINING PROGRAM

## 2021-06-25 PROCEDURE — 43762 RPLC GTUBE NO REVJ TRC: CPT

## 2021-06-25 PROCEDURE — 3E0G76Z INTRODUCTION OF NUTRITIONAL SUBSTANCE INTO UPPER GI, VIA NATURAL OR ARTIFICIAL OPENING: ICD-10-PCS | Performed by: SURGERY

## 2021-06-25 PROCEDURE — 93005 ELECTROCARDIOGRAM TRACING: CPT | Performed by: STUDENT IN AN ORGANIZED HEALTH CARE EDUCATION/TRAINING PROGRAM

## 2021-06-25 PROCEDURE — 94640 AIRWAY INHALATION TREATMENT: CPT

## 2021-06-25 PROCEDURE — 99233 SBSQ HOSP IP/OBS HIGH 50: CPT | Performed by: STUDENT IN AN ORGANIZED HEALTH CARE EDUCATION/TRAINING PROGRAM

## 2021-06-25 PROCEDURE — 80053 COMPREHEN METABOLIC PANEL: CPT

## 2021-06-25 PROCEDURE — 0DH60UZ INSERTION OF FEEDING DEVICE INTO STOMACH, OPEN APPROACH: ICD-10-PCS | Performed by: SURGERY

## 2021-06-25 PROCEDURE — 6360000002 HC RX W HCPCS: Performed by: INTERNAL MEDICINE

## 2021-06-25 PROCEDURE — 43832 GSTRST OPEN W/CONSTJ TUBE: CPT | Performed by: SURGERY

## 2021-06-25 PROCEDURE — 74176 CT ABD & PELVIS W/O CONTRAST: CPT

## 2021-06-25 PROCEDURE — 6360000002 HC RX W HCPCS: Performed by: STUDENT IN AN ORGANIZED HEALTH CARE EDUCATION/TRAINING PROGRAM

## 2021-06-25 PROCEDURE — 36415 COLL VENOUS BLD VENIPUNCTURE: CPT

## 2021-06-25 PROCEDURE — 2500000003 HC RX 250 WO HCPCS: Performed by: STUDENT IN AN ORGANIZED HEALTH CARE EDUCATION/TRAINING PROGRAM

## 2021-06-25 PROCEDURE — 99291 CRITICAL CARE FIRST HOUR: CPT | Performed by: STUDENT IN AN ORGANIZED HEALTH CARE EDUCATION/TRAINING PROGRAM

## 2021-06-25 PROCEDURE — 83605 ASSAY OF LACTIC ACID: CPT

## 2021-06-25 PROCEDURE — 2709999900 HC NON-CHARGEABLE SUPPLY: Performed by: SURGERY

## 2021-06-25 PROCEDURE — 7100000000 HC PACU RECOVERY - FIRST 15 MIN: Performed by: SURGERY

## 2021-06-25 PROCEDURE — 93010 ELECTROCARDIOGRAM REPORT: CPT | Performed by: INTERNAL MEDICINE

## 2021-06-25 PROCEDURE — 71275 CT ANGIOGRAPHY CHEST: CPT

## 2021-06-25 PROCEDURE — 3700000000 HC ANESTHESIA ATTENDED CARE: Performed by: SURGERY

## 2021-06-25 PROCEDURE — 2700000000 HC OXYGEN THERAPY PER DAY

## 2021-06-25 PROCEDURE — 85025 COMPLETE CBC W/AUTO DIFF WBC: CPT

## 2021-06-25 PROCEDURE — 3600000012 HC SURGERY LEVEL 2 ADDTL 15MIN: Performed by: SURGERY

## 2021-06-25 PROCEDURE — 7100000001 HC PACU RECOVERY - ADDTL 15 MIN: Performed by: SURGERY

## 2021-06-25 PROCEDURE — 94669 MECHANICAL CHEST WALL OSCILL: CPT

## 2021-06-25 PROCEDURE — 2000000000 HC ICU R&B

## 2021-06-25 PROCEDURE — 3700000001 HC ADD 15 MINUTES (ANESTHESIA): Performed by: SURGERY

## 2021-06-25 PROCEDURE — 2500000003 HC RX 250 WO HCPCS: Performed by: SURGERY

## 2021-06-25 PROCEDURE — 2500000003 HC RX 250 WO HCPCS

## 2021-06-25 PROCEDURE — 6360000004 HC RX CONTRAST MEDICATION: Performed by: RADIOLOGY

## 2021-06-25 PROCEDURE — 2580000003 HC RX 258: Performed by: FAMILY MEDICINE

## 2021-06-25 PROCEDURE — 3600000002 HC SURGERY LEVEL 2 BASE: Performed by: SURGERY

## 2021-06-25 RX ORDER — 0.9 % SODIUM CHLORIDE 0.9 %
500 INTRAVENOUS SOLUTION INTRAVENOUS ONCE
Status: COMPLETED | OUTPATIENT
Start: 2021-06-25 | End: 2021-06-25

## 2021-06-25 RX ORDER — MIDAZOLAM HYDROCHLORIDE 1 MG/ML
INJECTION INTRAMUSCULAR; INTRAVENOUS PRN
Status: DISCONTINUED | OUTPATIENT
Start: 2021-06-25 | End: 2021-06-25 | Stop reason: SDUPTHER

## 2021-06-25 RX ORDER — BUPIVACAINE HYDROCHLORIDE AND EPINEPHRINE 2.5; 5 MG/ML; UG/ML
INJECTION, SOLUTION EPIDURAL; INFILTRATION; INTRACAUDAL; PERINEURAL PRN
Status: DISCONTINUED | OUTPATIENT
Start: 2021-06-25 | End: 2021-06-25 | Stop reason: ALTCHOICE

## 2021-06-25 RX ORDER — ONDANSETRON 2 MG/ML
4 INJECTION INTRAMUSCULAR; INTRAVENOUS
Status: DISCONTINUED | OUTPATIENT
Start: 2021-06-25 | End: 2021-06-25 | Stop reason: HOSPADM

## 2021-06-25 RX ORDER — SODIUM CHLORIDE, SODIUM LACTATE, POTASSIUM CHLORIDE, AND CALCIUM CHLORIDE .6; .31; .03; .02 G/100ML; G/100ML; G/100ML; G/100ML
500 INJECTION, SOLUTION INTRAVENOUS ONCE
Status: COMPLETED | OUTPATIENT
Start: 2021-06-25 | End: 2021-06-25

## 2021-06-25 RX ORDER — ALBUMIN (HUMAN) 12.5 G/50ML
25 SOLUTION INTRAVENOUS EVERY 8 HOURS
Status: COMPLETED | OUTPATIENT
Start: 2021-06-25 | End: 2021-06-26

## 2021-06-25 RX ORDER — FENTANYL CITRATE 50 UG/ML
INJECTION, SOLUTION INTRAMUSCULAR; INTRAVENOUS PRN
Status: DISCONTINUED | OUTPATIENT
Start: 2021-06-25 | End: 2021-06-25 | Stop reason: SDUPTHER

## 2021-06-25 RX ORDER — ETOMIDATE 2 MG/ML
INJECTION INTRAVENOUS PRN
Status: DISCONTINUED | OUTPATIENT
Start: 2021-06-25 | End: 2021-06-25 | Stop reason: SDUPTHER

## 2021-06-25 RX ORDER — NALOXONE HYDROCHLORIDE 0.4 MG/ML
0.4 INJECTION, SOLUTION INTRAMUSCULAR; INTRAVENOUS; SUBCUTANEOUS PRN
Status: DISCONTINUED | OUTPATIENT
Start: 2021-06-25 | End: 2021-07-02 | Stop reason: HOSPADM

## 2021-06-25 RX ORDER — METOPROLOL TARTRATE 5 MG/5ML
5 INJECTION INTRAVENOUS ONCE
Status: DISCONTINUED | OUTPATIENT
Start: 2021-06-25 | End: 2021-06-26

## 2021-06-25 RX ORDER — FENTANYL CITRATE 50 UG/ML
50 INJECTION, SOLUTION INTRAMUSCULAR; INTRAVENOUS EVERY 5 MIN PRN
Status: DISCONTINUED | OUTPATIENT
Start: 2021-06-25 | End: 2021-06-25 | Stop reason: HOSPADM

## 2021-06-25 RX ORDER — 0.9 % SODIUM CHLORIDE 0.9 %
250 INTRAVENOUS SOLUTION INTRAVENOUS ONCE
Status: COMPLETED | OUTPATIENT
Start: 2021-06-25 | End: 2021-06-25

## 2021-06-25 RX ORDER — NALOXONE HYDROCHLORIDE 0.4 MG/ML
INJECTION, SOLUTION INTRAMUSCULAR; INTRAVENOUS; SUBCUTANEOUS
Status: COMPLETED
Start: 2021-06-25 | End: 2021-06-25

## 2021-06-25 RX ORDER — SODIUM CHLORIDE 9 MG/ML
INJECTION, SOLUTION INTRAVENOUS CONTINUOUS
Status: DISCONTINUED | OUTPATIENT
Start: 2021-06-25 | End: 2021-06-26

## 2021-06-25 RX ORDER — MEPERIDINE HYDROCHLORIDE 25 MG/ML
12.5 INJECTION INTRAMUSCULAR; INTRAVENOUS; SUBCUTANEOUS EVERY 5 MIN PRN
Status: DISCONTINUED | OUTPATIENT
Start: 2021-06-25 | End: 2021-06-25 | Stop reason: HOSPADM

## 2021-06-25 RX ORDER — IPRATROPIUM BROMIDE AND ALBUTEROL SULFATE 2.5; .5 MG/3ML; MG/3ML
1 SOLUTION RESPIRATORY (INHALATION) ONCE
Status: COMPLETED | OUTPATIENT
Start: 2021-06-25 | End: 2021-06-25

## 2021-06-25 RX ORDER — ONDANSETRON 2 MG/ML
INJECTION INTRAMUSCULAR; INTRAVENOUS PRN
Status: DISCONTINUED | OUTPATIENT
Start: 2021-06-25 | End: 2021-06-25 | Stop reason: SDUPTHER

## 2021-06-25 RX ORDER — MIDODRINE HYDROCHLORIDE 5 MG/1
10 TABLET ORAL ONCE
Status: DISCONTINUED | OUTPATIENT
Start: 2021-06-25 | End: 2021-06-25

## 2021-06-25 RX ORDER — KETAMINE HYDROCHLORIDE 10 MG/ML
INJECTION, SOLUTION INTRAMUSCULAR; INTRAVENOUS PRN
Status: DISCONTINUED | OUTPATIENT
Start: 2021-06-25 | End: 2021-06-25 | Stop reason: SDUPTHER

## 2021-06-25 RX ORDER — DEXAMETHASONE SODIUM PHOSPHATE 4 MG/ML
INJECTION, SOLUTION INTRA-ARTICULAR; INTRALESIONAL; INTRAMUSCULAR; INTRAVENOUS; SOFT TISSUE PRN
Status: DISCONTINUED | OUTPATIENT
Start: 2021-06-25 | End: 2021-06-25 | Stop reason: SDUPTHER

## 2021-06-25 RX ADMIN — WATER 1000 MG: 1 INJECTION INTRAMUSCULAR; INTRAVENOUS; SUBCUTANEOUS at 10:14

## 2021-06-25 RX ADMIN — DOXYCYCLINE 100 MG: 100 INJECTION, POWDER, LYOPHILIZED, FOR SOLUTION INTRAVENOUS at 15:27

## 2021-06-25 RX ADMIN — KETAMINE HYDROCHLORIDE 20 MG: 10 INJECTION INTRAMUSCULAR; INTRAVENOUS at 07:30

## 2021-06-25 RX ADMIN — IPRATROPIUM BROMIDE AND ALBUTEROL SULFATE 1 AMPULE: .5; 3 SOLUTION RESPIRATORY (INHALATION) at 08:44

## 2021-06-25 RX ADMIN — DOXYCYCLINE 100 MG: 100 INJECTION, POWDER, LYOPHILIZED, FOR SOLUTION INTRAVENOUS at 03:36

## 2021-06-25 RX ADMIN — KETAMINE HYDROCHLORIDE 20 MG: 10 INJECTION INTRAMUSCULAR; INTRAVENOUS at 07:43

## 2021-06-25 RX ADMIN — SODIUM CHLORIDE 500 ML: 9 INJECTION, SOLUTION INTRAVENOUS at 11:20

## 2021-06-25 RX ADMIN — MIDAZOLAM 1 MG: 1 INJECTION INTRAMUSCULAR; INTRAVENOUS at 07:19

## 2021-06-25 RX ADMIN — SODIUM CHLORIDE 250 ML: 9 INJECTION, SOLUTION INTRAVENOUS at 10:15

## 2021-06-25 RX ADMIN — Medication 10 ML: at 21:09

## 2021-06-25 RX ADMIN — MIDAZOLAM 1 MG: 1 INJECTION INTRAMUSCULAR; INTRAVENOUS at 07:28

## 2021-06-25 RX ADMIN — SODIUM CHLORIDE 500 ML: 9 INJECTION, SOLUTION INTRAVENOUS at 13:00

## 2021-06-25 RX ADMIN — NYSTATIN 500000 UNITS: 500000 SUSPENSION ORAL at 17:26

## 2021-06-25 RX ADMIN — IOPAMIDOL 75 ML: 755 INJECTION, SOLUTION INTRAVENOUS at 21:49

## 2021-06-25 RX ADMIN — FENTANYL CITRATE 25 MCG: 50 INJECTION, SOLUTION INTRAMUSCULAR; INTRAVENOUS at 07:44

## 2021-06-25 RX ADMIN — ALBUMIN (HUMAN) 25 G: 0.25 INJECTION, SOLUTION INTRAVENOUS at 12:45

## 2021-06-25 RX ADMIN — ETOMIDATE 2 MG: 2 INJECTION, SOLUTION INTRAVENOUS at 07:33

## 2021-06-25 RX ADMIN — NYSTATIN 500000 UNITS: 500000 SUSPENSION ORAL at 21:12

## 2021-06-25 RX ADMIN — DEXAMETHASONE SODIUM PHOSPHATE 10 MG: 4 INJECTION, SOLUTION INTRAMUSCULAR; INTRAVENOUS at 07:37

## 2021-06-25 RX ADMIN — SODIUM CHLORIDE, POTASSIUM CHLORIDE, SODIUM LACTATE AND CALCIUM CHLORIDE 500 ML: 600; 310; 30; 20 INJECTION, SOLUTION INTRAVENOUS at 17:26

## 2021-06-25 RX ADMIN — HYDROMORPHONE HYDROCHLORIDE 0.5 MG: 1 INJECTION, SOLUTION INTRAMUSCULAR; INTRAVENOUS; SUBCUTANEOUS at 23:05

## 2021-06-25 RX ADMIN — FENTANYL CITRATE 25 MCG: 50 INJECTION, SOLUTION INTRAMUSCULAR; INTRAVENOUS at 08:08

## 2021-06-25 RX ADMIN — FENTANYL CITRATE 25 MCG: 50 INJECTION, SOLUTION INTRAMUSCULAR; INTRAVENOUS at 08:04

## 2021-06-25 RX ADMIN — Medication 2000 MG: at 07:35

## 2021-06-25 RX ADMIN — KETAMINE HYDROCHLORIDE 10 MG: 10 INJECTION INTRAMUSCULAR; INTRAVENOUS at 07:35

## 2021-06-25 RX ADMIN — ETOMIDATE 2 MG: 2 INJECTION, SOLUTION INTRAVENOUS at 07:35

## 2021-06-25 RX ADMIN — NALOXONE HYDROCHLORIDE 0.4 MG: 0.4 INJECTION, SOLUTION INTRAMUSCULAR; INTRAVENOUS; SUBCUTANEOUS at 12:17

## 2021-06-25 RX ADMIN — ONDANSETRON 4 MG: 2 INJECTION INTRAMUSCULAR; INTRAVENOUS at 07:40

## 2021-06-25 RX ADMIN — ALBUMIN (HUMAN) 25 G: 0.25 INJECTION, SOLUTION INTRAVENOUS at 21:09

## 2021-06-25 RX ADMIN — SODIUM CHLORIDE: 9 INJECTION, SOLUTION INTRAVENOUS at 14:08

## 2021-06-25 RX ADMIN — HYDROMORPHONE HYDROCHLORIDE 0.5 MG: 1 INJECTION, SOLUTION INTRAMUSCULAR; INTRAVENOUS; SUBCUTANEOUS at 10:42

## 2021-06-25 RX ADMIN — FENTANYL CITRATE 25 MCG: 50 INJECTION, SOLUTION INTRAMUSCULAR; INTRAVENOUS at 08:12

## 2021-06-25 RX ADMIN — SODIUM CHLORIDE 500 ML: 9 INJECTION, SOLUTION INTRAVENOUS at 12:18

## 2021-06-25 RX ADMIN — KETAMINE HYDROCHLORIDE 10 MG: 10 INJECTION INTRAMUSCULAR; INTRAVENOUS at 07:33

## 2021-06-25 ASSESSMENT — ENCOUNTER SYMPTOMS
SHORTNESS OF BREATH: 1
DIARRHEA: 0
COUGH: 0
CHEST TIGHTNESS: 0
VOMITING: 0
RHINORRHEA: 0
CONSTIPATION: 0
ABDOMINAL PAIN: 1
NAUSEA: 0
SHORTNESS OF BREATH: 1
SORE THROAT: 0

## 2021-06-25 ASSESSMENT — PAIN SCALES - GENERAL
PAINLEVEL_OUTOF10: 4
PAINLEVEL_OUTOF10: 0
PAINLEVEL_OUTOF10: 6

## 2021-06-25 ASSESSMENT — PULMONARY FUNCTION TESTS
PIF_VALUE: 1

## 2021-06-25 ASSESSMENT — COPD QUESTIONNAIRES: CAT_SEVERITY: SEVERE

## 2021-06-25 NOTE — CONSULTS
Critical Care Admit/Consult Note         Patient Wally Mondragon   MRN -  [id number]   Acct # - [de-identified]   - 1960      Date of Admission -  2021  6:54 AM  Date of evaluation -  2021  [address] Day - 7            ADMIT/CONSULT DETAILS     Reason for Admit/Consult   Persistent tachycardia, hypoxia, hypotension postop    Consulting Stacey Rosales MD  Primary Care Physician - Edward Carvalho,          HPI   The patient is a 62 y.o. male with significant past medical history of small cell lung cancer and COPD who presented with shortness of breath. He had previously completed 2 chemotherapy treatments for his small cell lung cancer and continue to feel worse. He stated he had cough and phlegm production. He has been using his inhalers with no relief. He continues to smoke half a pack per day. He endorses cervical neck pain, dysphagia, decreased appetite, and constipation. He denied fever chills headache or dizziness admission. He was originally admitted for acute on chronic hypoxia with underlying small cell lung cancer. During his time the hospital, patient had a Mediport placed on , a right-sided thoracentesis after the Mediport, and today had a open PEG tube placement due to esophageal compression. Patient tolerated procedure well but postop he became tachycardic and hypotensive. RRT was called for hypotension and desaturation. Patient with increased from 4 L to 6 L of oxygen and received multiple fluid boluses was given Narcan x1. He was also tachycardic in the 140s persistently. Responded well to the increase in oxygen and the fluid boluses and was saturating in the 90s and BP was 90s/60s during the RRT.       Past Medical History         Diagnosis Date    Alcohol abuse     Cancer ([facility] [address]     Lung    Lung mass     right    Seasonal allergies     Tobacco abuse         Past Surgical History           Procedure Laterality Date  BRONCHOSCOPY N/A 6/3/2021    BRONCHOSCOPY W/EBUS FNA performed by Roel Borrero MD at [address] [facility] N/A 6/3/2021    BRONCHOSCOPY performed by Roel Borrero MD at [address] N/A 2021    OPEN GASTROSTOMY TUBE performed by Shelton Ha MD at [facility] N/A 2021    MEDI PORT INSERTION performed by Shelton Ha MD at [place] [address] Right 2021    RIGHT THORACENTESIS ULTRASOUND performed by Roel Borrero MD at [facility] N/A 2021    EGD DIAGNOSTIC ONLY performed by Shelton Ha MD at [facility] ENDOSCOPY       Current Medications   Current Medications    metoprolol  5 mg Intravenous Once    albumin human  25 g Intravenous Q8H    methylPREDNISolone  40 mg Intravenous Daily    cefTRIAXone (ROCEPHIN) IV  1,000 mg Intravenous Q24H    doxycycline (VIBRAMYCIN) IV  100 mg Intravenous Q12H    sodium chloride flush  5-40 mL Intravenous 2 times per day    [Held by provider] enoxaparin  40 mg Subcutaneous Daily    famotidine  20 mg Oral BID    ipratropium-albuterol  1 ampule Inhalation Q4H WA    nystatin  5 mL Oral 4x Daily    nicotine  1 patch Transdermal Daily     HYDROmorphone **OR** HYDROmorphone, naloxone, guaiFENesin, sodium chloride flush, sodium chloride, ondansetron **OR** ondansetron, polyethylene glycol, acetaminophen **OR** acetaminophen  IV Drips/Infusions   sodium chloride 125 mL/hr at 21 1408    sodium chloride       Home Medications  Medications Prior to Admission: [] methylPREDNISolone (MEDROL DOSEPACK) 4 MG tablet, Take 4 mg by mouth See Admin Instructions /3/  acetaminophen (TYLENOL) 500 MG tablet, Take 500 mg by mouth every 6 hours as needed for Pain  ondansetron (ZOFRAN-ODT) 4 MG disintegrating tablet, Take 1 tablet by mouth every 8 hours as needed for Nausea or Vomiting    Diet/Nutrition   Diet NPO    Allergies   Patient has no known allergies. Social History   Tobacco   reports that he has been smoking. He started smoking about 46 years ago. He has been smoking about 0.50 packs per day. He has never used smokeless tobacco.    Alcohol     reports previous alcohol use of about 25.0 standard drinks of alcohol per week. Occupational history :    Family History         Problem Relation Age of Onset    No Known Problems Mother     Alzheimer's Disease Father     Heart Attack Paternal Grandmother      ROS     REVIEW OF SYSTEMS:  Review of Systems   Constitutional: Negative for chills, fatigue and fever. HENT: Negative for congestion, rhinorrhea and sore throat. Respiratory: Positive for shortness of breath. Negative for cough and chest tightness. Cardiovascular: Negative for chest pain and palpitations. Gastrointestinal: Positive for abdominal pain (over surgical scar). Negative for constipation, diarrhea, nausea and vomiting. Genitourinary: Negative for dysuria and frequency. Neurological: Negative for dizziness and light-headedness. All other systems reviewed and are negative. Lines and Devices   Mediport placed     Mechanical Ventilation Data   VENT SETTINGS (Comprehensive)  Vent Information  SpO2: (!) 86 %  Additional Respiratory  Assessments  Pulse: 142  Resp: 24  SpO2: (!) 86 %    ABG  No results found for: PH, PCO2, PO2, HCO3, O2SAT  No results found for: IFIO2, MODE, SETTIDVOL, SETPEEP        Vitals    height is 5' 7\" (1.702 m) and weight is 105 lb 9.6 oz (47.9 kg). His oral temperature is 98.1 °F (36.7 °C). His blood pressure is 90/62 and his pulse is 142. His respiration is 24 and oxygen saturation is 86% (abnormal).        Temperature Range: Temp: 98.1 °F (36.7 °C) Temp  Av.9 °F (36.6 °C)  Min: 97.2 °F (36.2 °C)  Max: 98.4 °F (36.9 °C)  BP Range:  Systolic (34UWI), ZDK:611 , Min:74 , SBA:174     Diastolic (67PHH), DFQ:35, Min:54, Max:78    Pulse Range: Pulse  Av.6  Min: 57  Max: 154  Respiration Range: Resp  Av.6  Min: 16  Max: 26  Current Pulse Ox[de-identified]  SpO2: (!) 86 %  24HR Pulse Ox Range:  SpO2  Av.4 %  Min: 86 %  Max: 100 %  Oxygen Amount and Delivery: O2 Flow Rate (L/min): 5 L/min      I/O (24 Hours)    Patient Vitals for the past 8 hrs:   BP Temp Temp src Pulse Resp SpO2 Height   21 1424 90/62 -- -- -- -- -- --   21 1420 -- -- -- -- -- (!) 86 % --   21 1416 90/66 -- -- -- -- (!) 89 % --   21 1330 (!) 80/56 98.1 °F (36.7 °C) Oral 142 24 -- --   21 1315 100/68 -- -- -- -- -- --   21 1221 94/64 98.2 °F (36.8 °C) Oral 140 26 -- --   21 1215 (!) 80/56 -- -- 143 -- -- --   21 1202 (!) 74/54 -- -- 149 -- -- --   21 1121 -- -- -- -- -- -- 5' 7\" (1.702 m)   21 1100 (!) 80/54 -- -- 154 -- -- --   21 0941 114/74 97.8 °F (36.6 °C) Oral 102 20 97 % --   21 0900 92/68 -- -- 108 16 93 % --   21 0830 (!) 99/59 -- -- 57 19 93 % --   21 0822 (!) 96/58 97.2 °F (36.2 °C) Temporal 58 18 92 % --       Intake/Output Summary (Last 24 hours) at 2021 1446  Last data filed at 2021 0816  Gross per 24 hour   Intake --   Output 10 ml   Net -10 ml     I/O last 3 completed shifts:   In: 150 [I.V.:150]  Out: -    Date 21 - 21   Shift 6330-7401 1693-2822 3030-3864 24 Hour Total   INTAKE   Shift Total(mL/kg)       OUTPUT   Blood(mL/kg)  10(0.2)  10(0.2)   Shift Total(mL/kg)  10(0.2)  10(0.2)   Weight (kg) 47.9 47.9 47.9 47.9     Patient Vitals for the past 96 hrs (Last 3 readings):   Weight   21 0126 105 lb 9.6 oz (47.9 kg)   21 0050 105 lb 9.6 oz (47.9 kg)         Drains/Tubes Outputs  None  Exam     PHYSICAL EXAM:  General appearance -awake, alert, in mild respiratory distress  Mental status - alert, oriented to person, place, and time  Eyes - pupils equal and reactive, extraocular eye movements intact  Ears - external ear canals normal  Nose - normal and patent, no erythema, discharge or polyps  Mouth - mucous membranes moist, pharynx normal without lesions  Neck - supple, no significant adenopathy  Chest -rhonchorous throughout lung fields  Heart - normal rate, regular rhythm, normal S1, S2, no murmurs, rubs, clicks or gallops  Abdomen - soft, tender around PEG site, nondistended, no masses or organomegaly  Neurological - alert, oriented, normal speech, no focal findings or movement disorder noted  Extremities - peripheral pulses normal, no pedal edema, no clubbing or cyanosis  Skin - normal coloration and turgor, no rashes, no suspicious skin lesions noted     Data   Old records and images have been reviewed    Lab Results   CBC     Lab Results   Component Value Date    WBC 2.3 06/25/2021    RBC 3.99 06/25/2021    HGB 11.3 06/25/2021    HCT 36.2 06/25/2021    PLT 90 06/25/2021    MCV 90.7 06/25/2021    MCH 28.3 06/25/2021    MCHC 31.2 06/25/2021    RDW 14.5 06/25/2021    NRBC 0.0 06/20/2021    LYMPHOPCT 2.0 06/21/2021    MONOPCT 1.0 06/21/2021    BASOPCT 0.0 06/21/2021    MONOSABS 0.07 06/21/2021    LYMPHSABS 0.14 06/21/2021    EOSABS 0.00 06/21/2021    BASOSABS 0.00 06/21/2021       BMP   Lab Results   Component Value Date     06/25/2021    K 4.4 06/25/2021     06/25/2021    CO2 27 06/25/2021    BUN 19 06/25/2021    CREATININE 0.4 06/25/2021    GLUCOSE 94 06/25/2021    CALCIUM 8.0 06/25/2021       LFTS  Lab Results   Component Value Date    ALKPHOS 62 06/25/2021    ALT 8 06/25/2021    AST 17 06/25/2021    PROT 6.5 06/25/2021    BILITOT 0.5 06/25/2021    LABALBU 2.9 06/25/2021       INR  No results for input(s): PROTIME, INR in the last 72 hours. APTT  No results for input(s): APTT in the last 72 hours. Lactic Acid  Lab Results   Component Value Date    LACTA 1.1 06/25/2021    LACTA 1.1 05/07/2021        BNP   No results for input(s): BNP in the last 72 hours.      Cultures     No results for input(s): BC in the last 72 hours. No results for input(s): Emmalene Leonardo in the last 72 hours. No results for input(s): LABURIN in the last 72 hours. Radiology   CXR                                                                                Chest x-ray 6/22: Showed a right-sided chest port in good position, no pneumothorax, moderate right-sided pleural effusion unchanged    CT Scans   CTA of the chest 6/22:  Impression   1. There is no evidence of a pulmonary embolus. 2. Moderate size right pleural effusion   3. Advanced emphysematous changes with superimposed pneumonia seen within the   left lower lobe and within the lingula.  This finding is new when compared   with the previous CT scan of 06/11/2021 and PET-CT scan of 06/16/2021.   4. Large right perihilar mass with extension into the mediastinum consistent   with the history of metastatic small cell lung CA   5. Significant right hilar, mediastinal core right paratracheal and supra and   infraclavicular lymphadenopathy. 6. Pleural metastasis.  Chronically distended esophagus secondary to the   metastatic lymphadenopathy within the inferior posterior mediastinum. 6/25: Abdomen and pelvic  Impression   Extensive postoperative free air in the abdomen.       Air also identified in the anterior abdominal wall subcutaneous tissues.       Consolidation in the bilateral lung bases may be secondary to atelectasis   postoperative versus superimposed pneumonia in the proper clinical setting.       Partially imaged right-sided pleural effusion.        SYSTEMS ASSESSMENT    Neuro   Alert and oriented x3    Respiratory   Acute hypoxic respiratory failure  -COPD exacerbation versus pneumonia  -Underwent right-sided thoracentesis on 6/22  -Hypoxic, tachycardic, hypotensive, CTA chest pending  -On Solu-Medrol wean down to 40 mg daily  -Continue duo nebs   -Continue Rocephin and doxy  -Desaturated during RRT requiring about from 4 L to 6 L of oxygen saturating well  -Saturating at 98% on 6 L  -Continue monitor  -Wean oxygen as tolerated. Keep O2 sat 90-92%    Cardiovascular   Tachycardia  Patient has had sinus tachycardia since operation this morning in the 140s  -EKG showed sinus tach  -CTA chest to rule out PE, could be anesthetic reaction  -Discontinued metoprolol 5 mg  -Continue monitor    Hypotensive  -Hypotensive postop  -Given multiple fluid boluses and pressure responded  -Given albumin x1  -Give additional 500 mL LR bolus  -Continue monitor    Gastrointestinal   Dysphagia  -Compression of the esophagus due to to small cell lung cancer  -Patient with tachycardia and hypotension status post open gastrostomy, per surgery low suspicion for postop bleeding causing hypotension tachycardia  -Continue monitor  -General surgery consult appreciated    Renal   No acute needs     Infectious Disease   Community-acquired pneumonia  -Concern for pneumonia on admission with hypoxia  -Continue Rocephin and doxycycline  -Pro-Omer mildly elevated  -Continue monitor    Hematology/Oncology   Anemia  -Hemoglobin 11.3  -Likely due to multiple surgeries and dilution due to fluid boluses  -Continue monitor    Endocrine   No acute issues    Social/Spiritual/DNR/Other   Full code, palliative consult  DVT prophylaxis with Lovenox currently being held  GI prophylaxis with Pepcid twice daily    Anna Hannon DO  PGY -1    6/25/2021  2:46 PM    I personally saw, examined and provided care for the patient. Radiographs, labs and medication list were reviewed by me independently. Review of Residents documentation was conducted and revisions were made as appropriate. I agree with the above documented exam, problem list and plan of care.         CCT excluding procedures 38 minutes    Bhakti Saini DO

## 2021-06-25 NOTE — PROGRESS NOTES
Patient admitted from 5S to ICU bed 211, with the following belongings Shirt pants underwear socks shoes, package of pajama short, note book, cell phone and , placed on monitor, patient oriented to room and unit visiting hours. Patient guide at bedside, reviewed patient rights and responsibilities. MRSA nasal swab obtained. Bed alarm on. Call light within reach.

## 2021-06-25 NOTE — PROGRESS NOTES
movement, no respiratory distress  Cardiovascular: normal rate, normal S1 and S2 and no carotid bruits  Abdomen: soft, non-tender, non-distended, normal bowel sounds, no masses or organomegaly  Extremities: no cyanosis, no clubbing and no edema  Neurologic: no cranial nerve deficit and speech normal      Recent Labs     06/25/21  1233      K 4.4      CO2 27   BUN 19   CREATININE 0.4*   GLUCOSE 94   CALCIUM 8.0*       Recent Labs     06/25/21  1233   WBC 2.3*   RBC 3.99   HGB 11.3*   HCT 36.2*   MCV 90.7   MCH 28.3   MCHC 31.2*   RDW 14.5   PLT 90*   MPV 8.3       Micro:  No components found for: [facility])    Radiology:   No results found. Assessment:    Active Problems:    Acute on chronic respiratory failure with hypoxia (HCC)    Severe protein-calorie malnutrition (HCC)  Resolved Problems:    * No resolved hospital problems. *      Plan:  1. Acute on Chronic Hypoxic  Respiratory Failure with underlying lung cancer, emphysematous changes, right pleural effusion -respiratory panel negative, continue respiratory support with oxygen and nebs. On Vest therapy. pulmonary and Oncology consulted. Status post right subclavian Mediport insertion 6/22. 2.  Small Cell Lung Cancer - Oncology consult. 3.  Tobacco Dependence - Nicoderm 21 mg patch daily. 4.  Hoarseness - nystatin suspension qid.   5.  Acute COPD exacerbation with possible postobstructive pneumonia/aspiration pneumonia- IR guided thoracocentesis 5/22, on IV Rocephin and doxy. On IV steroids wean as tolerated, incentive spirometry. 6.  Dysphagia - secondary to mediastinal lymphadenopathy, likely distended esophagus, general surgery on board, EGD was attempted 6/24 for PEG placement but extrinsic compression on the esophagus prevented the scope advancement, patient will need open gastrostomy tube placement in OR tomorrow per general surgery. Consulted speech, failed swallow study, for open gastrostomy today.     DVT prophylaxis - Lovenox  Social - will need home oxygen and home health care on discharge. NOTE: This report was transcribed using voice recognition software. Every effort was made to ensure accuracy; however, inadvertent computerized transcription errors may be present.   Electronically signed by Natalee Carias MD on 6/25/2021 at 3:22 PM

## 2021-06-25 NOTE — PROGRESS NOTES
Dr Terrie Gonzales called about patients blood pressure of 74/54 continuing to drop.  500 cc bolus and narcan ordered

## 2021-06-25 NOTE — PROGRESS NOTES
[facility] RRT/Code Blue Note    Subjective: Patient short of breath, more congested, denies any abdominal pain, denies any palpitation. Called to bedside to respond to RRT, patient desaturating from 4 L to 6 L oxygen after fluid boluses received for his ongoing hypotension  & tachycardia       metoprolol  5 mg Intravenous Once    albumin human  25 g Intravenous Q8H    methylPREDNISolone  40 mg Intravenous Daily    cefTRIAXone (ROCEPHIN) IV  1,000 mg Intravenous Q24H    doxycycline (VIBRAMYCIN) IV  100 mg Intravenous Q12H    sodium chloride flush  5-40 mL Intravenous 2 times per day    [Held by provider] enoxaparin  40 mg Subcutaneous Daily    famotidine  20 mg Oral BID    ipratropium-albuterol  1 ampule Inhalation Q4H WA    nystatin  5 mL Oral 4x Daily    nicotine  1 patch Transdermal Daily     HYDROmorphone, 0.5 mg, Q3H PRN   Or  HYDROmorphone, 1 mg, Q3H PRN  naloxone, 0.4 mg, PRN  guaiFENesin, 400 mg, 4x Daily PRN  sodium chloride flush, 5-40 mL, PRN  sodium chloride, 25 mL, PRN  ondansetron, 4 mg, Q8H PRN   Or  ondansetron, 4 mg, Q6H PRN  polyethylene glycol, 17 g, Daily PRN  acetaminophen, 650 mg, Q6H PRN   Or  acetaminophen, 650 mg, Q6H PRN         Objective:    BP 90/62   Pulse 142   Temp 98.1 °F (36.7 °C) (Oral)   Resp 24   Ht 5' 7\" (1.702 m)   Wt 105 lb 9.6 oz (47.9 kg)   SpO2 (!) 86%   BMI 16.54 kg/m²   No distended neck vein, CTA bilateral, added breath sounds, postop abdominal binder, dressing with drains, PEG, no neurological deficits, S1-S2 normal, no pedal edema    Recent Labs     06/25/21  1233      K 4.4      CO2 27   BUN 19   CREATININE 0.4*   GLUCOSE 94   CALCIUM 8.0*       Recent Labs     06/25/21  1233   WBC 2.3*   RBC 3.99   HGB 11.3*   HCT 36.2*   MCV 90.7   MCH 28.3   MCHC 31.2*   RDW 14.5   PLT 90*   MPV 8.3          I/O last 3 completed shifts:   In: 150 [I.V.:150]  Out: -   I/O this shift:  In: -   Out: 10 [Blood:10]      Assessment:    Active Problems:    Acute on chronic respiratory failure with hypoxia (HCC)    Severe protein-calorie malnutrition (HCC)  Resolved Problems:    * No resolved hospital problems. *      Plan:  1. Acute hypoxic respiratory failure -patient required 6 L of oxygen, patient was on 4 L of oxygen before getting fluid boluses, has right-sided pleural effusion. To rule out postop aspiration pneumonia. Suspicion for PE, underlying malignancy, consider CTA lung (will have to monitor renal function closely as the patient already got CT abdomen today)    2. Hypotension -postop -patient with a BMI of 16 has received ketamine, fentanyl, Dilaudid, etomidate, Versed, bupivacaine epinephrine block, Demerol. Patient status post Narcan. Patient status post 1750 cc of fluid bolus, on continuous 150 cc of maintenance fluid, midodrine 10 mg.    3.  Sinus tachycardia -EKG reviewed, s/p metoprolol 5 mg. 4.  Postop (open  gastrostomy) CT abdomen obtained-suggestive of-less likely perforation given lactic acid 1.1, Extensive postoperative free air in the abdomen. Air also identified in the anterior abdominal wall subcutaneous tissues. Findings could just be the postop finding. Critical care time > 30 minutes not including procedures.     Electronically signed by Jayy Myrick MD on 6/25/2021 at 2:59 PM

## 2021-06-25 NOTE — PLAN OF CARE
Problem: Falls - Risk of:  Goal: Will remain free from falls  Description: Will remain free from falls  Outcome: Met This Shift  Goal: Absence of physical injury  Description: Absence of physical injury  Outcome: Met This Shift     Problem: Pain:  Goal: Pain level will decrease  Description: Pain level will decrease  Outcome: Met This Shift  Goal: Control of acute pain  Description: Control of acute pain  6/25/2021 1937 by Rosalind Henning RN  Outcome: Met This Shift  6/25/2021 1541 by Rita Davis RN  Outcome: Met This Shift  Goal: Control of chronic pain  Description: Control of chronic pain  Outcome: Met This Shift     Problem: FLUID AND ELECTROLYTE IMBALANCE  Goal: Fluid and electrolyte balance are achieved/maintained  Outcome: Met This Shift     Problem: Skin Integrity:  Goal: Will show no infection signs and symptoms  Description: Will show no infection signs and symptoms  Outcome: Met This Shift  Goal: Absence of new skin breakdown  Description: Absence of new skin breakdown  Outcome: Met This Shift     Problem: Malnutrition  (NI-5.2)  Goal: Food and/or Nutrient Delivery  Description: Individualized approach for food/nutrient provision.   6/25/2021 1145 by Jennifer Olivier RD, LD  Outcome: Ongoing

## 2021-06-25 NOTE — PROGRESS NOTES
Patient rapidly destating had to increase 02 to high flow 6L.  Patient rapidly declining can not stabilize  RRT called at this time

## 2021-06-25 NOTE — PROGRESS NOTES
Comprehensive Nutrition Assessment    Type and Reason for Visit:  Initial, RD Nutrition Re-Screen/LOS    Nutrition Recommendations/Plan:    Continue NPO Open Gastrostomy placed today. Once TF consulted and approved per surgery  the following TF rec as follows:    TF Jevity 1.5 to goal rate @ 45cc/hr  To meet goal silvana provide 1080TV, 1620kcal, 69g protein and 821 water    Nutrition Assessment:  Pt adm with SOB with COPD exacerbation  and Small Cell Lung Cancer. He completed two chemotherapy treatments last week. Pt stated appetite and PO intake poor prior to adm ~1-2wks d/t chemo. Per SLP pt failed swallowing test dysphagia diagnosed. Per EMR Dysphagia - secondary to mediastinal lymphadenopathy, likely distended esophagus, general surgery on board, EGD was attempted 6/24 for PEG placement but extrinsic compression on the esophagus prevented the scope advancement, patient will need open gastrostomy tube placement in OR today per general surgery. Per EMR will proceed with open gastrostomy tube placement today  Pt at risk with severe malnutrition d/t poor PO d/t  cancer on chemo. Once diet progresses will add ONS with each meal to help meet nutritional needs.     Malnutrition Assessment:  Malnutrition Status:  Severe malnutrition    Context:  Chronic Illness     Findings of the 6 clinical characteristics of malnutrition:  Energy Intake:  Mild decrease in energy intake (Comment) (Pt stated prior to adm poor PO and appetite ~1-2wks d/t chemo)  Weight Loss:  1 - Mild weight loss (specify amount and time period) (26#s over 20months)     Body Fat Loss:  7 - Severe body fat loss Orbital, Triceps   Muscle Mass Loss:  7 - Severe muscle mass loss Temples (temporalis), Clavicles (pectoralis & deltoids), Hand (interosseous)  Fluid Accumulation:  No significant fluid accumulation     Strength:  Not Performed    Estimated Daily Nutrient Needs:  Energy (kcal):  6582-2517; Weight Used for Energy Requirements:  Current Protein (g):  70-85g (1.5-1.8g/kg CBW); Weight Used for Protein Requirements:  Current        Fluid (ml/day):  ; Method Used for Fluid Requirements:  1 ml/kcal      Nutrition Related Findings:  A&Ox4, BS present, Abd WDL, +I/Os, no Edema      Wounds:  None (redness coccyx)       Current Nutrition Therapies:    Diet NPO    Anthropometric Measures:  · Height: 5' 7\" (170.2 cm)  · Current Body Weight: 105 lb 9.6 oz (47.9 kg) (6/23 Bedscale)   · Admission Body Weight: 106 lb 4.2 oz (48.2 kg) (6/20 bedscale)    · Usual Body Weight: 131 lb 12.8 oz (59.8 kg) (10/05/2019 actual)     · Ideal Body Weight: 148 lbs; % Ideal Body Weight 71.4 %   · BMI: 16.5      · BMI Categories: Underweight (BMI less than 22) age over 72       Nutrition Diagnosis:   · Severe malnutrition, In context of chronic illness related to catabolic illness (Cancer on Chemo Tx) as evidenced by NPO or clear liquid status due to medical condition, poor intake prior to admission, weight loss, severe loss of subcutaneous fat, severe muscle loss      Nutrition Interventions:   Food and/or Nutrient Delivery:  Continue NPO  Nutrition Education/Counseling:  Education not appropriate   Coordination of Nutrition Care:  Continue to monitor while inpatient    Goals:  Nutrition Progression       Nutrition Monitoring and Evaluation:   Behavioral-Environmental Outcomes:  None Identified   Food/Nutrient Intake Outcomes:  Diet Advancement/Tolerance, Food and Nutrient Intake, Supplement Intake  Physical Signs/Symptoms Outcomes:  Biochemical Data, GI Status, Nausea or Vomiting, Fluid Status or Edema, Hemodynamic Status, Skin, Weight, Nutrition Focused Physical Findings     Discharge Planning:     Too soon to determine     Electronically signed by Dusty Ugalde RD, LD on 6/25/21 at 11:50 AM EDT    Contact: [phone]

## 2021-06-25 NOTE — OP NOTE
Operative Note: Gastrostomy Tube Placement    Ajay Murphy     DATE OF PROCEDURE: 6/25/2021  SURGEON: Surgeon(s):  Theresa Mon MD    PREOPERATIVE DIAGNOSES:   Dysphagia, esophageal obstruction    POSTOPERATIVE DIAGNOSES:  Same      OPERATION:    Open gastrostomy tube placement    ESTIMATED BLOOD LOSS: Minimal    ANESTHESIA: LMAC      CONSENT AND INDICATIONS:  This is a 62y.o. year old male who is having the above issues. I have discussed with the patient and/or the patient representative the indication, alternatives, and the possible risks and/or complications of the planned procedure and the anesthesia methods. The patient and/or patient representative appear to understand and agree to proceed. OPERATION: The patient was placed on the table and sedated by anesthesia. The abdomen was prepped with ChloraPrep and draped in a sterile fashion. A small upper midline incision was made with a 15 blade scalpel. Using electrocautery, the layers of the abdominal wall were opened down to peritoneum. The peritoneum was grasped with two straight clamps and opened using a scalpel. Once inside the abdomen, the stomach was visualized and grasped with a leslie clamp and delivered into the incision. Tthe incision was deepened with a sharp pati and the fascia was pierced. Next, a spot on the anterior stomach wall in the body of the stomach was chosen for gastrostomy tube placement. This area was grasped with two leslie clamps. Using electrocautery, a small hole was made though the stomach wall until gastric mucosa was visualized. Either side of the hole was grasped with leslie clamps to ensure the gastrostomy tube was placed into the stomach. Then, using two 2-0 Vicryl sutures, two purse-string sutures were sewn around the gastrostomy tube to secure the tube in place.  A spot to the left of midline on the anterior abdominal wall was then identified as the location for gastrostomy tube placement and a small stab incision was made through the skin. The tubing of the gastrostomy tube was grasped with the pati clamp and pulled through the abdominal wall. The stomach was then pexied to the abdominal wall using these two sutures. A third 2-0 Vicryl suture was used to pexy the stomach at the 9 oclock position as well. At the end of the procedure the stomach was  secure against the abdominal wall with good placement of the gastrostomy tube. There was good hemostasis as well. The fascia was closed with two 0 looped PDS sutures. The wound was irrigated with warm saline and hemostasis was obtained using electrocautery. The skin was closed with staples and dressed with a sterile dressing. The outer plastic bolster was attached to the tube with a dry tube gauze on the skin. An abdominal binder was placed to protect the tube from the patient touching it. The patient tolerated the procedure well. Physician Signature: Electronically signed by Dr. Brian Dos Santos MD M.D. FACS    Send copy of H&P to PCP, Rimma Mendes DO and referring physician, No ref.  provider found

## 2021-06-25 NOTE — PROGRESS NOTES
Called Dr. Anuj Kelly about patients condition still declining bp 80/56 and heart rate of 142.  Said she will call back

## 2021-06-26 LAB
ANION GAP SERPL CALCULATED.3IONS-SCNC: 9 MMOL/L (ref 7–16)
BASOPHILS ABSOLUTE: 0.01 E9/L (ref 0–0.2)
BASOPHILS RELATIVE PERCENT: 0.5 % (ref 0–2)
BUN BLDV-MCNC: 15 MG/DL (ref 6–20)
CALCIUM SERPL-MCNC: 8.5 MG/DL (ref 8.6–10.2)
CHLORIDE BLD-SCNC: 102 MMOL/L (ref 98–107)
CHOLESTEROL, TOTAL: 93 MG/DL (ref 0–199)
CO2: 27 MMOL/L (ref 22–29)
CREAT SERPL-MCNC: 0.3 MG/DL (ref 0.7–1.2)
EOSINOPHILS ABSOLUTE: 0.01 E9/L (ref 0.05–0.5)
EOSINOPHILS RELATIVE PERCENT: 0.5 % (ref 0–6)
GFR AFRICAN AMERICAN: >60
GFR NON-AFRICAN AMERICAN: >60 ML/MIN/1.73
GLUCOSE BLD-MCNC: 147 MG/DL (ref 74–99)
HBA1C MFR BLD: 5.7 % (ref 4–5.6)
HCT VFR BLD CALC: 32.9 % (ref 37–54)
HDLC SERPL-MCNC: 36 MG/DL
HEMOGLOBIN: 10.3 G/DL (ref 12.5–16.5)
HYPOCHROMIA: ABNORMAL
IMMATURE GRANULOCYTES #: 0.01 E9/L
IMMATURE GRANULOCYTES %: 0.5 % (ref 0–5)
LDL CHOLESTEROL CALCULATED: 43 MG/DL (ref 0–99)
LYMPHOCYTES ABSOLUTE: 0.43 E9/L (ref 1.5–4)
LYMPHOCYTES RELATIVE PERCENT: 21.1 % (ref 20–42)
MAGNESIUM: 2 MG/DL (ref 1.6–2.6)
MCH RBC QN AUTO: 28.3 PG (ref 26–35)
MCHC RBC AUTO-ENTMCNC: 31.3 % (ref 32–34.5)
MCV RBC AUTO: 90.4 FL (ref 80–99.9)
MONOCYTES ABSOLUTE: 0.13 E9/L (ref 0.1–0.95)
MONOCYTES RELATIVE PERCENT: 6.4 % (ref 2–12)
NEUTROPHILS ABSOLUTE: 1.45 E9/L (ref 1.8–7.3)
NEUTROPHILS RELATIVE PERCENT: 71 % (ref 43–80)
OVALOCYTES: ABNORMAL
PDW BLD-RTO: 14.4 FL (ref 11.5–15)
PHOSPHORUS: 2.1 MG/DL (ref 2.5–4.5)
PLATELET # BLD: 81 E9/L (ref 130–450)
PLATELET CONFIRMATION: NORMAL
PMV BLD AUTO: 8.7 FL (ref 7–12)
POLYCHROMASIA: ABNORMAL
POTASSIUM SERPL-SCNC: 3.8 MMOL/L (ref 3.5–5)
RBC # BLD: 3.64 E12/L (ref 3.8–5.8)
SODIUM BLD-SCNC: 138 MMOL/L (ref 132–146)
T4 FREE: 1.07 NG/DL (ref 0.93–1.7)
TRIGL SERPL-MCNC: 68 MG/DL (ref 0–149)
TSH SERPL DL<=0.05 MIU/L-ACNC: 3.55 UIU/ML (ref 0.27–4.2)
VLDLC SERPL CALC-MCNC: 14 MG/DL
WBC # BLD: 2 E9/L (ref 4.5–11.5)

## 2021-06-26 PROCEDURE — 83036 HEMOGLOBIN GLYCOSYLATED A1C: CPT

## 2021-06-26 PROCEDURE — 2580000003 HC RX 258: Performed by: STUDENT IN AN ORGANIZED HEALTH CARE EDUCATION/TRAINING PROGRAM

## 2021-06-26 PROCEDURE — 2500000003 HC RX 250 WO HCPCS: Performed by: INTERNAL MEDICINE

## 2021-06-26 PROCEDURE — 99233 SBSQ HOSP IP/OBS HIGH 50: CPT | Performed by: INTERNAL MEDICINE

## 2021-06-26 PROCEDURE — 94668 MNPJ CHEST WALL SBSQ: CPT

## 2021-06-26 PROCEDURE — 6360000002 HC RX W HCPCS: Performed by: STUDENT IN AN ORGANIZED HEALTH CARE EDUCATION/TRAINING PROGRAM

## 2021-06-26 PROCEDURE — 94669 MECHANICAL CHEST WALL OSCILL: CPT

## 2021-06-26 PROCEDURE — 2700000000 HC OXYGEN THERAPY PER DAY

## 2021-06-26 PROCEDURE — 99255 IP/OBS CONSLTJ NEW/EST HI 80: CPT | Performed by: INTERNAL MEDICINE

## 2021-06-26 PROCEDURE — 2500000003 HC RX 250 WO HCPCS: Performed by: STUDENT IN AN ORGANIZED HEALTH CARE EDUCATION/TRAINING PROGRAM

## 2021-06-26 PROCEDURE — 84439 ASSAY OF FREE THYROXINE: CPT

## 2021-06-26 PROCEDURE — 93005 ELECTROCARDIOGRAM TRACING: CPT | Performed by: STUDENT IN AN ORGANIZED HEALTH CARE EDUCATION/TRAINING PROGRAM

## 2021-06-26 PROCEDURE — 36415 COLL VENOUS BLD VENIPUNCTURE: CPT

## 2021-06-26 PROCEDURE — 99222 1ST HOSP IP/OBS MODERATE 55: CPT | Performed by: NURSE PRACTITIONER

## 2021-06-26 PROCEDURE — 80061 LIPID PANEL: CPT

## 2021-06-26 PROCEDURE — 85025 COMPLETE CBC W/AUTO DIFF WBC: CPT

## 2021-06-26 PROCEDURE — 6360000002 HC RX W HCPCS: Performed by: INTERNAL MEDICINE

## 2021-06-26 PROCEDURE — 80048 BASIC METABOLIC PNL TOTAL CA: CPT

## 2021-06-26 PROCEDURE — 6360000002 HC RX W HCPCS: Performed by: FAMILY MEDICINE

## 2021-06-26 PROCEDURE — 99233 SBSQ HOSP IP/OBS HIGH 50: CPT | Performed by: STUDENT IN AN ORGANIZED HEALTH CARE EDUCATION/TRAINING PROGRAM

## 2021-06-26 PROCEDURE — 84443 ASSAY THYROID STIM HORMONE: CPT

## 2021-06-26 PROCEDURE — 84100 ASSAY OF PHOSPHORUS: CPT

## 2021-06-26 PROCEDURE — 6370000000 HC RX 637 (ALT 250 FOR IP): Performed by: FAMILY MEDICINE

## 2021-06-26 PROCEDURE — APPSS180 APP SPLIT SHARED TIME > 60 MINUTES: Performed by: NURSE PRACTITIONER

## 2021-06-26 PROCEDURE — 6370000000 HC RX 637 (ALT 250 FOR IP): Performed by: STUDENT IN AN ORGANIZED HEALTH CARE EDUCATION/TRAINING PROGRAM

## 2021-06-26 PROCEDURE — 94640 AIRWAY INHALATION TREATMENT: CPT

## 2021-06-26 PROCEDURE — 36591 DRAW BLOOD OFF VENOUS DEVICE: CPT

## 2021-06-26 PROCEDURE — 87081 CULTURE SCREEN ONLY: CPT

## 2021-06-26 PROCEDURE — 6360000002 HC RX W HCPCS: Performed by: NURSE PRACTITIONER

## 2021-06-26 PROCEDURE — 2060000000 HC ICU INTERMEDIATE R&B

## 2021-06-26 PROCEDURE — 83735 ASSAY OF MAGNESIUM: CPT

## 2021-06-26 PROCEDURE — P9047 ALBUMIN (HUMAN), 25%, 50ML: HCPCS | Performed by: INTERNAL MEDICINE

## 2021-06-26 RX ORDER — LEVALBUTEROL INHALATION SOLUTION 0.63 MG/3ML
0.63 SOLUTION RESPIRATORY (INHALATION) EVERY 8 HOURS PRN
Status: DISCONTINUED | OUTPATIENT
Start: 2021-06-26 | End: 2021-06-27

## 2021-06-26 RX ORDER — DIGOXIN 0.25 MG/ML
250 INJECTION INTRAMUSCULAR; INTRAVENOUS EVERY 6 HOURS SCHEDULED
Status: COMPLETED | OUTPATIENT
Start: 2021-06-26 | End: 2021-06-27

## 2021-06-26 RX ORDER — METOPROLOL TARTRATE 5 MG/5ML
5 INJECTION INTRAVENOUS ONCE
Status: COMPLETED | OUTPATIENT
Start: 2021-06-26 | End: 2021-06-26

## 2021-06-26 RX ADMIN — Medication 10 ML: at 10:23

## 2021-06-26 RX ADMIN — METOPROLOL TARTRATE 12.5 MG: 25 TABLET, FILM COATED ORAL at 23:53

## 2021-06-26 RX ADMIN — WATER 1000 MG: 1 INJECTION INTRAMUSCULAR; INTRAVENOUS; SUBCUTANEOUS at 10:23

## 2021-06-26 RX ADMIN — METHYLPREDNISOLONE SODIUM SUCCINATE 40 MG: 40 INJECTION, POWDER, LYOPHILIZED, FOR SOLUTION INTRAMUSCULAR; INTRAVENOUS at 10:23

## 2021-06-26 RX ADMIN — DIGOXIN 250 MCG: 250 INJECTION, SOLUTION INTRAMUSCULAR; INTRAVENOUS; PARENTERAL at 20:52

## 2021-06-26 RX ADMIN — METOPROLOL TARTRATE 5 MG: 5 INJECTION INTRAVENOUS at 06:55

## 2021-06-26 RX ADMIN — ALBUMIN (HUMAN) 25 G: 0.25 INJECTION, SOLUTION INTRAVENOUS at 05:15

## 2021-06-26 RX ADMIN — DOXYCYCLINE 100 MG: 100 INJECTION, POWDER, LYOPHILIZED, FOR SOLUTION INTRAVENOUS at 15:48

## 2021-06-26 RX ADMIN — SODIUM CHLORIDE: 9 INJECTION, SOLUTION INTRAVENOUS at 09:02

## 2021-06-26 RX ADMIN — Medication 10 ML: at 20:52

## 2021-06-26 RX ADMIN — ENOXAPARIN SODIUM 50 MG: 60 INJECTION SUBCUTANEOUS at 17:05

## 2021-06-26 RX ADMIN — FAMOTIDINE 20 MG: 20 TABLET ORAL at 20:52

## 2021-06-26 RX ADMIN — SODIUM CHLORIDE: 9 INJECTION, SOLUTION INTRAVENOUS at 00:24

## 2021-06-26 RX ADMIN — IPRATROPIUM BROMIDE AND ALBUTEROL SULFATE 1 AMPULE: 2.5; .5 SOLUTION RESPIRATORY (INHALATION) at 08:11

## 2021-06-26 RX ADMIN — METOPROLOL TARTRATE 12.5 MG: 25 TABLET, FILM COATED ORAL at 10:23

## 2021-06-26 RX ADMIN — DIGOXIN 250 MCG: 250 INJECTION, SOLUTION INTRAMUSCULAR; INTRAVENOUS; PARENTERAL at 15:48

## 2021-06-26 RX ADMIN — HYDROMORPHONE HYDROCHLORIDE 0.5 MG: 1 INJECTION, SOLUTION INTRAMUSCULAR; INTRAVENOUS; SUBCUTANEOUS at 04:03

## 2021-06-26 RX ADMIN — FAMOTIDINE 20 MG: 20 TABLET ORAL at 10:23

## 2021-06-26 RX ADMIN — DOXYCYCLINE 100 MG: 100 INJECTION, POWDER, LYOPHILIZED, FOR SOLUTION INTRAVENOUS at 03:23

## 2021-06-26 RX ADMIN — LEVALBUTEROL HYDROCHLORIDE 0.63 MG: 0.63 SOLUTION RESPIRATORY (INHALATION) at 16:08

## 2021-06-26 ASSESSMENT — PAIN DESCRIPTION - PAIN TYPE: TYPE: SURGICAL PAIN

## 2021-06-26 ASSESSMENT — PAIN SCALES - GENERAL
PAINLEVEL_OUTOF10: 0
PAINLEVEL_OUTOF10: 6
PAINLEVEL_OUTOF10: 0

## 2021-06-26 ASSESSMENT — PAIN DESCRIPTION - LOCATION: LOCATION: ABDOMEN

## 2021-06-26 ASSESSMENT — PAIN DESCRIPTION - DESCRIPTORS: DESCRIPTORS: ACHING;DISCOMFORT;SHARP

## 2021-06-26 ASSESSMENT — PAIN - FUNCTIONAL ASSESSMENT: PAIN_FUNCTIONAL_ASSESSMENT: PREVENTS OR INTERFERES SOME ACTIVE ACTIVITIES AND ADLS

## 2021-06-26 NOTE — PROGRESS NOTES
[facility] Progress Note    Admitting Date and Time: 6/18/2021  6:54 AM  Admit Dx: Acute on chronic respiratory failure with hypoxia (HCC) [J96.21]    Subjective:  Patient is being followed for Acute on chronic respiratory failure with hypoxia ([address] [J96.21]   Pt denies any chest pain, palpitations, reports some cough and congestion, wanted to eat despite being told he has a PEG tube. Per RN: No major concerns, transferred out of the ICU, remains tachycardic. ROS: denies fever, chills, cp, sob, n/v, HA unless stated above.      metoprolol tartrate  12.5 mg Oral BID    digoxin  250 mcg Intravenous 4 times per day    enoxaparin  1 mg/kg Subcutaneous Daily    methylPREDNISolone  40 mg Intravenous Daily    cefTRIAXone (ROCEPHIN) IV  1,000 mg Intravenous Q24H    doxycycline (VIBRAMYCIN) IV  100 mg Intravenous Q12H    sodium chloride flush  5-40 mL Intravenous 2 times per day    famotidine  20 mg Oral BID    [Held by provider] nicotine  1 patch Transdermal Daily     levalbuterol, 0.63 mg, Q8H PRN  perflutren lipid microspheres, 1.5 mL, ONCE PRN  HYDROmorphone, 0.5 mg, Q3H PRN   Or  HYDROmorphone, 1 mg, Q3H PRN  naloxone, 0.4 mg, PRN  guaiFENesin, 400 mg, 4x Daily PRN  sodium chloride flush, 5-40 mL, PRN  sodium chloride, 25 mL, PRN  ondansetron, 4 mg, Q8H PRN   Or  ondansetron, 4 mg, Q6H PRN  polyethylene glycol, 17 g, Daily PRN  acetaminophen, 650 mg, Q6H PRN   Or  acetaminophen, 650 mg, Q6H PRN         Objective:    BP 99/77   Pulse 144   Temp 99.4 °F (37.4 °C) (Oral)   Resp 22   Ht 5' 7\" (1.702 m)   Wt 99 lb 13.9 oz (45.3 kg)   SpO2 96%   BMI 15.64 kg/m²     General Appearance: alert and oriented to person, place and time and  4 L oxygen by nasal cannula, frail  Skin: warm and dry  Head: normocephalic and atraumatic  Eyes: pupils equal, round, and reactive to light, extraocular eye movements intact, conjunctivae normal  Neck: neck supple and non tender without mass Pulmonary/Chest: diminished bilaterally Rt > Left - + wheezes, rales or rhonchi, normal air movement, no respiratory distress  Cardiovascular: Tachycardic, normal S1 and S2 and no carotid bruits  Abdomen: soft, non-tender, non-distended, normal bowel sounds, no masses or organomegaly  Extremities: no cyanosis, no clubbing and no edema  Neurologic: no cranial nerve deficit and speech normal      Recent Labs     06/25/21  1233 06/26/21  0545    138   K 4.4 3.8    102   CO2 27 27   BUN 19 15   CREATININE 0.4* 0.3*   GLUCOSE 94 147*   CALCIUM 8.0* 8.5*       Recent Labs     06/25/21  1233 06/26/21  0545   WBC 2.3* 2.0*   RBC 3.99 3.64*   HGB 11.3* 10.3*   HCT 36.2* 32.9*   MCV 90.7 90.4   MCH 28.3 28.3   MCHC 31.2* 31.3*   RDW 14.5 14.4   PLT 90* 81*   MPV 8.3 8.7       Micro:  No components found for: [facility])    Radiology:   No results found. Assessment:    Active Problems:    Acute on chronic respiratory failure with hypoxia (HCC)    Severe protein-calorie malnutrition (HCC)  Resolved Problems:    * No resolved hospital problems. *      Plan:  1. Acute on Chronic Hypoxic  Respiratory Failure with underlying lung cancer, emphysematous changes, right pleural effusion -respiratory panel negative, continue respiratory support with oxygen and nebs. On Vest therapy. pulmonary and Oncology consulted. Status post right subclavian Mediport insertion 6/22. RRT - 6/25 - Hypotension -postop 6/25 -patient with a BMI of 16 has received ketamine, fentanyl, Dilaudid, etomidate, Versed, bupivacaine epinephrine block, Demerol. status post Narcan, 1750 cc of fluid bolus, was on continuous 150 cc now dced , 1 dose midodrine 10 mg. CTA negative for PE, lactic acid normal, Postop (open  gastrostomy) CT abdomen obtained-suggestive of Extensive postoperative free air in the abdomen. Air also identified in the anterior abdominal wall subcutaneous tissues. Findings expected postop finding. General surgery on board.     2.  Small Cell Lung Cancer - Oncology  & palliative consult. 3.  Tobacco Dependence - Nicoderm 21 mg patch daily. 4.  Hoarseness - nystatin suspension qid.   5.  Acute COPD exacerbation with possible postobstructive pneumonia/aspiration pneumonia- IR guided thoracocentesis 5/22, on IV Rocephin and doxy. On IV steroids wean as tolerated, incentive spirometry. 6.  Dysphagia s/p open gastrostomy 6/26 - secondary to mediastinal lymphadenopathy, likely distended esophagus, general surgery on board, EGD was attempted 6/24 for PEG placement but extrinsic compression on the esophagus prevented the scope advancement. Consulted speech, failed swallow study. 7.    Inappropriate persistent sinus tachycardia -EKG reviewed, TSH normal, s/p metoprolol 5 mg. Started on metoprolol 12.5 mg twice daily, consulted cardiology -recommend atrial flutter 2;1,  full dose anticoagulation and digoxin. 8.  Thrombocytopenia -consulted oncology for recommendations on full dose anticoagulation. DVT prophylaxis - Lovenox full dose  Social - will need home oxygen and home health care on discharge. Palliative care on board, CODE STATUS readdressed with patient, patient wants to remain full code, has a daughter but he takes his own decision. Patient has a poor insight, doubtful understands prognosis, wanted to eat right after PEG tube placement. NOTE: This report was transcribed using voice recognition software. Every effort was made to ensure accuracy; however, inadvertent computerized transcription errors may be present.   Electronically signed by Caleb Haywood MD on 6/26/2021 at 2:30 PM

## 2021-06-26 NOTE — CONSULTS
Inpatient Cardiology Consultation      Reason for Consult:  Persistent Inappropriate ST    Consulting Physician: Dr Kings Greer    Requesting Physician:  Dr Santino Cox    Date of Consultation: 6/26/2021    HISTORY OF PRESENT ILLNESS: 61 yo  male not previously known to any cardiologist and reports no previous cardiac testing. PMH: 40 pack years (currently smoking 1/2 ppd), COPD, ETOH abuse, marijuana use, SCLC on Carboplatin + Etoposide + Tecentriq 1st cycle 6/16/2021    [facility] 6/18/2018 +SOB and phlegm production, dysphagia, decreased appetite  with continued tobacco abuse. O2 saturation 88% on RA--> 2 liters NC. /60-->92/64. Reports dysphagia for several weeks progressively worsening and weight loss of approximately 30 pounds over last 3 months    COVID-19 NEGATIVE. Na 140, K+ 3.4, Bun/Cr 17/0.5, troponin 10, LFT\"s and CBC WNL, Plt 258. Initial EKG 6/18/2021 SR 80's    6/21/2021 Surgical consult for Medi-Port    6/22/021 Medi-port placement    6/24/2021 Dysphagia--> EGD-->Distal esophageal obstruction     6/25/2021 Na 140, K+ 4.4, Bun/Cr 19/0.4, Albumin 2.9, LFT's WNL, Hgb 11.3, WBC 2.3, Plt 90    6/25/2021 Open gastrostomy tube placement    EKG 6/25/2021 AFL 's    6/25/2021 RRT called for hypotension/hypoxia and tachycardia ('s) BP 90's/60's, O2 increased from 2 liters to 6 liters and receievd fluid bolus, Narcan x 1    6/25/2021 CT Abdomen/Pelvis: Extensive postoperative free air in the abdomen. Air also identified in the anterior abdominal wall subcutaneous tissues. Consolidation in the bilateral lung bases may be secondary to atelectasis postoperative versus superimposed pneumonia in the proper clinical setting. Partially imaged right-sided pleural effusion. 6/26/2021 CTA Chest W: No evidence of pulmonary embolism.  Again noted are multiple areas of consolidation and scattered patchy opacities, progressed since the previous exam and is concerning for multifocal airspace disease process. Moderate size right pleural effusion with pleural metastatic implants are again noted. Large right perihilar mass with extension to the mediastinum is again noted. Significant mediastinal lymphadenopathy including supraclavicular lymph nodes is again noted suggestive of disease extension. 6/26/2021 Na 136, K+ 3.8, Bun/Cr 15/0.3, magnesium 2.0, WBC 2.0, Hgb 10.3, Plt 81    6/26/2021 Surgery-->okay to start trickle tube feeds today - do not advance past 10mL/hr.    6/26/2021 5 mg IV Lopressor @ 0700 then started on Lopressor 12.5 mg BID.     6/26/2021 Palliative consult for code status and goals of care--> He wishes to continue with full supportive care and full code at this time. He plans to continue with treatment as directed by oncology. An outpatient referral to the pallietive clinic was made shortly before this admission and outpatient follow up will be arranged. Current BP 99/77 -140's AFL, T max 99.4, O2 saturation 96% on 4 liters. Please note: past medical records were reviewed per electronic medical record (EMR) - see detailed reports under Past Medical/ Surgical History. Past Medical History:    1. Marijuana use  2. 40 pack years currently smoking 1/2 ppd  3. ETOH abuse  4. 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 5. 5/17/2021 [facility] ED for SOB, hemoptysis for several weeks along with dysphagia. Placed on ATB's by his PCP. 6. 5/7/2021 CTA Pulmonary: no PE. Right lower lobe/right hilar mass measuring approximately 5 cm with multiple  7. enlarged mediastinal lymph nodes. Severe bilateral emphysema. Moderate right-sided pleural effusion.   8. 5/7/2021 Patient declined admission  9. 5/12/2021 Outpatient with Dr Jil Reynolds thoracentesis on Monday and then hopefully after that we will do bronc and EBUS if needed  10. 5/17/2021 R thoracentesis--> 500 cc--> fluid Negative for malignant cells. Fiberoptic bronchoscopy. EBUS-FNA. Bronchial washing. RLL--> Small cell carcinoma  11. 6/11/2021 MRI Brain: No evidence of metastatic disease  12. 6/11/2021 CT Abdomen/Pelvis: Total collapse of right lower lobe, chronic large heterogeneously enhancing mediastinal mass encasing the margarette, right main bronchus and extending into the right hilum and subcarinal lymph nodes. The hilar component has poorly defined margins and measuring 6 x 6 cm-extends behind field of view. Subcarinal lymphadenopathy measures 6.1 x 4.2 x 7 cm. Distal esophagus is severely depressed. There is compression of the posterior left atrium and partial encasement of the pulmonary veins and right pulmonary artery. Multiple enhancing nodular pleural based metastatic masses along anterior, lateral and posterior right, mid and lower chest and long right diaphragm-largest pleural nodule along right chest measuring 2.8 x 3.4 cm. Large enhancing mass in posterior mediastinum sitting on the diaphragm measuring 4.1 x 4.5 x 3.3 cm. Additional enhancing nodules in the upper abdomen, around the GE junction, margins of the diaphragm, posterior margins of left hepatic lobe and in the gastrohepatic ligament. No evidence for hepatic metastatic disease. Several small enhancing right inguinal lymph nodes, largest measuring 1.8 cm. No bony metastatic disease. 13. 6/15/2021 PET/CT: Mass centered in the right hilar region extending into the mediastinum consistent with history of small cell lung cancer with metabolically active metastatic mediastinal lymphadenopathy extending into the bilateral supraclavicular region and inferiorly into the retrocrural region. Metabolically active right pleural metastases with corresponding pleural effusion.  Metabolically active nodules in the skin of the abdomen are concerning for metastatic disease. The esophagus is distended which appears to be secondary to compression by lower mediastinal lymphadenopathy. Intense activity in the collapsed right lower lobe could be due to additional malignancy or postobstructive pneumonia.       Past Surgical History:  Hernia repair, tonsillectomy      Medications Prior to admit:  Prior to Admission medications    Medication Sig Start Date End Date Taking?  Authorizing Provider   acetaminophen (TYLENOL) 500 MG tablet Take 500 mg by mouth every 6 hours as needed for Pain   Yes Historical Provider, MD   ondansetron (ZOFRAN-ODT) 4 MG disintegrating tablet Take 1 tablet by mouth every 8 hours as needed for Nausea or Vomiting 6/14/21  Yes Pedro Pablo Aquino MD       Current Medications:    Current Facility-Administered Medications: metoprolol tartrate (LOPRESSOR) tablet 12.5 mg, 12.5 mg, Oral, BID  levalbuterol (XOPENEX) nebulization 0.63 mg, 0.63 mg, Nebulization, Q8H PRN  HYDROmorphone (DILAUDID) injection 0.5 mg, 0.5 mg, Intravenous, Q3H PRN **OR** HYDROmorphone (DILAUDID) injection 1 mg, 1 mg, Intravenous, Q3H PRN  naloxone (NARCAN) injection 0.4 mg, 0.4 mg, Intravenous, PRN  methylPREDNISolone sodium (SOLU-MEDROL) injection 40 mg, 40 mg, Intravenous, Daily  guaiFENesin tablet 400 mg, 400 mg, Oral, 4x Daily PRN  cefTRIAXone (ROCEPHIN) 1,000 mg in sterile water 10 mL IV syringe, 1,000 mg, Intravenous, Q24H  doxycycline (VIBRAMYCIN) 100 mg in dextrose 5 % 100 mL IVPB, 100 mg, Intravenous, Q12H  sodium chloride flush 0.9 % injection 5-40 mL, 5-40 mL, Intravenous, 2 times per day  sodium chloride flush 0.9 % injection 5-40 mL, 5-40 mL, Intravenous, PRN  0.9 % sodium chloride infusion, 25 mL, Intravenous, PRN  enoxaparin (LOVENOX) injection 40 mg, 40 mg, Subcutaneous, Daily  ondansetron (ZOFRAN-ODT) disintegrating tablet 4 mg, 4 mg, Oral, Q8H PRN **OR** ondansetron (ZOFRAN) injection 4 mg, 4 mg, Intravenous, Q6H PRN  polyethylene glycol (GLYCOLAX) packet 17 g, 17 g, Oral, Daily PRN  acetaminophen (TYLENOL) tablet 650 mg, 650 mg, Oral, Q6H PRN **OR** acetaminophen (TYLENOL) suppository 650 mg, 650 mg, Rectal, Q6H PRN  famotidine (PEPCID) tablet 20 mg, 20 mg, Oral, BID  [Held by provider] nicotine (NICODERM CQ) 21 MG/24HR 1 patch, 1 patch, Transdermal, Daily    Allergies:  NKDA per patient report    Social History:    40 pack years stopped 1 weeks ago  ETOH 8-10 beers a day (stopped approximately 3-4 weeks ago due to dysphagia)  Marijuana use last 1 week ago  Activity: his 24 yo son lives with him in 2 story home. Previously worked in construction. No assistive devices. Code Status: Full Code      Family History: Mother  age 80 from CVA. No reported CAD or DM  Father  age 80 from complications of Alzheimer's. No reported CAD or DM  No full siblings      REVIEW OF SYSTEMS:     · Constitutional: + fatigue. Denies fevers, chills or night sweats  · Eyes: Denies visual changes or drainage  · ENT: Denies headaches or hearing loss. No mouth sores or sore throat. No epistaxis   · Cardiovascular: Denies chest pain, pressure or palpitations. No lower extremity swelling. · Respiratory: + SOB and orthopnea. + weak cough with clear to milky sputum production. No hemoptysis   · Gastrointestinal: + dysphagia. Denies hematemesis or anorexia. No hematochezia or melena    · Genitourinary: Denies urgency, dysuria or hematuria. · Musculoskeletal: Denies gait disturbance, weakness or joint complaints  · Integumentary: Denies rash, hives or pruritis   · Neurological: Denies dizziness, headaches or seizures. No numbness or tingling  · Psychiatric: Denies anxiety or depression. · Endocrine: Denies temperature intolerance. No recent weight change. .  · Hematologic/Lymphatic: Denies abnormal bruising or bleeding. No swollen lymph nodes    PHYSICAL EXAM:   BP 99/77   Pulse 144   Temp 99.4 °F (37.4 °C) (Oral)   Resp 22   Ht 5' 7\" (1.702 m)   Wt 99 lb 13.9 oz (45.3 kg)   SpO2 96%   BMI 15.64 kg/m²   CONST:  Well developed, cahcetic ill appearing  male who appears of stated age. Awake, alert and cooperative.  No apparent distress. HEENT:   Head- Normocephalic, atraumatic   Eyes- Conjunctivae pink, anicteric  Throat- Oral mucosa pink and moist  Neck-  No stridor, trachea midline, no jugular venous distention. No carotid bruit. CHEST: Chest symmetrical and non-tender to palpation. No accessory muscle use or intercostal retractions. r chest wall Medi-port  RESPIRATORY: Lung sounds - coarse BS throughout, on 4 liters NC.   CARDIOVASCULAR:     Heart Ausculation- Tachycardic rate and rhythm, no murmur heard. PV: No lower extremity edema. No varicosities. Pedal pulses palpable, no clubbing or cyanosis. BLE PCD's on. ABDOMEN: Soft, non-tender to light palpation. Bowel sounds present. No palpable masses; no abdominal bruit. + PEG tube with binder on. TF infusing. MS: Good muscle strength and tone. No atrophy or abnormal movements. : Deferred  SKIN: Warm and dry no statis dermatitis or ulcers   NEURO / PSYCH: Oriented to person, place and time. Speech clear and appropriate. Follows all commands. Pleasant affect     DATA:    ECG 6/25/2021 AFL RVR  Tele strips: AFL RVR    Diagnostic:    See HPI    Intake/Output Summary (Last 24 hours) at 6/26/2021 1336  Last data filed at 6/26/2021 1100  Gross per 24 hour   Intake 5052 ml   Output 1975 ml   Net 3077 ml       Labs:   CBC:   Recent Labs     06/25/21  1233 06/26/21  0545   WBC 2.3* 2.0*   HGB 11.3* 10.3*   HCT 36.2* 32.9*   PLT 90* 81*     BMP:   Recent Labs     06/25/21  1233 06/26/21  0545    138   K 4.4 3.8   CO2 27 27   BUN 19 15   CREATININE 0.4* 0.3*   LABGLOM >60 >60   CALCIUM 8.0* 8.5*     Mag:   Recent Labs     06/26/21  0545   MG 2.0     Phos:   Recent Labs     06/26/21  0545   PHOS 2.1*     TFT: No results found for: TSH, C6NAEYI, A4OSDNV, THYROIDAB, FT3, T4FREE   HgA1c: No results found for: LABA1C  No results found for: EAG  proBNP: No results for input(s): PROBNP in the last 72 hours.   PT/INR: No results for input(s): PROTIME, INR in the last 72 hours.  APTT:No results for input(s): APTT in the last 72 hours. CARDIAC ENZYMES:No results for input(s): CKTOTAL, CKMB, CKMBINDEX, TROPHS in the last 72 hours. FASTING LIPID PANEL:No results found for: CHOL, HDL, LDLDIRECT, LDLCALC, TRIG  LIVER PROFILE:  Recent Labs     06/25/21  1233   AST 17   ALT 8   LABALBU 2.9*       Electronically signed by KAVYA Gupta on 6/26/2021 at 1:36 PM   ______________________________________________________________________  Cardiology attending attestation:  I have independently interviewed and examined the patient. I personally reviewed pertinent laboratory values and diagnostic testing (including, if applicable, chest xray, electrocardiogram, telemetry, echocardiogram, stress testing, and coronary angiography). I have reviewed the above documentation completed by GLORIA Gibson including past medical, social, and family history and agree with the findings, assessment and plan except where noted. Plan formulated under my direct supervision. I participated in all aspects of the medical decision making. Please see my additional contributions to the HPI, physical exam, and assessment / medical decision making:  _______________________________________________________________________    60-year-old male with recently diagnosed small cell lung cancer, admitted 6/18/2020 no shortness of breath. Has undergone Mediport placement, open G-tube 6/25/2021 and since that time has been in atrial flutter. Unable to do PEG due to esophageal obstruction. Presenting rhythm was sinus rhythm. He is in 2: 1 AV conduction with rates of 146 bpm.  He is unaware of the arrhythmia. Breathing is better. Denies chest pain. Difficulty speaking.     Physical Exam:  BP 99/77   Pulse 144   Temp 99.4 °F (37.4 °C) (Oral)   Resp 22   Ht 5' 7\" (1.702 m)   Wt 99 lb 13.9 oz (45.3 kg)   SpO2 96%   BMI 15.64 kg/m²   General appearance: Chronically ill-appearing malnourished almost cardioversion -will hold off on that unless necessary   Overall poor prognosis   Aggressive risk factor modification    Thank you for the consultation. Please do not hesitate to call with questions.     Sandra Barrow MD, [address] [facility] Cardiology

## 2021-06-26 NOTE — PROGRESS NOTES
Intensive Care Daily Quality Rounding Checklist      ICU Team Members:    ICU Day #: NUMBER: 2    Intubation Date: n/a    Ventilator Day #: n/a    Central Line Insertion Date: mediport accessed         Day #:      Arterial Line Insertion Date: n/a      Day #:     Temporary Hemodialysis Catheter Insertion Date: n/a      Day # n/a    DVT Prophylaxis: lovenox on hold     GI Prophylaxis: pepcid     Priest Catheter Insertion Date: n/a       Day #: n/a      Continued need (if yes, reason documented and discussed with physician): n/a    Skin Issues/ Wounds and ordered treatment discussed on rounds: skin tear     Goals/ Plans for the Day:

## 2021-06-26 NOTE — PROGRESS NOTES
Pulmonary Progress Note for Jasmina    Admit Date: 2021  Hospital day                               PCP: Milad Mendoza DO    Chief Complaint (s):  Patient Active Problem List   Diagnosis    Acute non-recurrent maxillary sinusitis    [facility]    Acute on chronic respiratory failure with hypoxia ([facility]    Severe protein-calorie malnutrition (HCC)    Typical atrial flutter (HCC)    Pneumonia of left lower lobe due to infectious organism    Pleural effusion       Subjective:  · Not seen yesterday as the patient was in surgery, today he is awake alert but hypotensive. Fluids are being given. Vitals:  VITALS:  BP 93/71   Pulse 148   Temp 96.8 °F (36 °C) (Axillary)   Resp 22   Ht 5' 7\" (1.702 m)   Wt 99 lb 13.9 oz (45.3 kg)   SpO2 96%   BMI 15.64 kg/m²     24HR INTAKE/OUTPUT:      Intake/Output Summary (Last 24 hours) at 2021 1810  Last data filed at 2021 1100  Gross per 24 hour   Intake 5052 ml   Output 1975 ml   Net 3077 ml       24HR PULSE OXIMETRY RANGE:    SpO2  Av.4 %  Min: 93 %  Max: 99 %    Medications:  IV:   sodium chloride         Scheduled Meds:   metoprolol tartrate  12.5 mg Oral BID    digoxin  250 mcg Intravenous 4 times per day    enoxaparin  50 mg Subcutaneous Daily    methylPREDNISolone  40 mg Intravenous Daily    cefTRIAXone (ROCEPHIN) IV  1,000 mg Intravenous Q24H    doxycycline (VIBRAMYCIN) IV  100 mg Intravenous Q12H    sodium chloride flush  5-40 mL Intravenous 2 times per day    famotidine  20 mg Oral BID    [Held by provider] nicotine  1 patch Transdermal Daily       Diet:   Diet NPO     EXAM:  General: No distress. Alert. Cachectic  Eyes: PERRL. No sclera icterus. No conjunctival injection. ENT: No discharge. Pharynx clear. Neck: Trachea midline. Normal thyroid. Resp: No accessory muscle use. LLL rales. No wheezing. Diffuse rhonchi. Absent at the right base. CV: Regular rate. Regular rhythm.  No murmur or rub. Abd: Binder in place  Skin: Warm and dry. No nodule on exposed extremities. No rash on exposed extremities. Ext: No cyanosis, clubbing, edema  Lymph: No cervical LAD. No supraclavicular LAD. M/S: No cyanosis. No joint deformity. Significant clubbing. Neuro: Awake. Follows commands. Positive pupils/gag/corneals. Normal pain response. Results:  CBC:   Recent Labs     06/25/21  1233 06/26/21  0545   WBC 2.3* 2.0*   HGB 11.3* 10.3*   HCT 36.2* 32.9*   MCV 90.7 90.4   PLT 90* 81*     BMP:   Recent Labs     06/25/21  1233 06/26/21  0545    138   K 4.4 3.8    102   CO2 27 27   PHOS  --  2.1*   BUN 19 15   CREATININE 0.4* 0.3*     LIVER PROFILE:   Recent Labs     06/25/21  1233   AST 17   ALT 8   BILITOT 0.5   ALKPHOS 62     PT/INR:   No results for input(s): PROTIME, INR in the last 72 hours. APTT: No results for input(s): APTT in the last 72 hours. Pathology:  1. N/A      Microbiology:  1. None    Recent ABG:   No results for input(s): PH, PO2, PCO2, HCO3, BE, O2SAT, METHB, O2HB, COHB, O2CON, HHB, THB in the last 72 hours. Recent Films:  CTA CHEST W CONTRAST   Final Result   No evidence of pulmonary embolism. Again noted are multiple areas of consolidation and scattered patchy   opacities, progressed since the previous exam and is concerning for   multifocal airspace disease process. Moderate size right pleural effusion with pleural metastatic implants are   again noted. Large right perihilar mass with extension to the mediastinum is again noted. Significant mediastinal lymphadenopathy including supraclavicular lymph nodes   is again noted suggestive of disease extension. CT ABDOMEN PELVIS WO CONTRAST Additional Contrast? None   Final Result   Extensive postoperative free air in the abdomen. Air also identified in the anterior abdominal wall subcutaneous tissues.       Consolidation in the bilateral lung bases may be secondary to atelectasis   postoperative versus superimposed pneumonia in the proper clinical setting. Partially imaged right-sided pleural effusion. FL MODIFIED BARIUM SWALLOW W VIDEO   Final Result   Positive aspiration to thin and nectar consistencies of barium contrast were. Please see separate speech pathology report for full discussion of findings   and recommendations. US THORACENTESIS Which side should the procedure be performed? Right   Final Result   Successful ultrasound guided thoracentesis. XR CHEST PORTABLE   Final Result   Right-sided chest port in good position. No pneumothorax. Moderate right-sided pleural effusions unchanged. FLUORO FOR SURGICAL PROCEDURES   Final Result   Intraprocedural fluoroscopic spot images as above. See separate procedure   report for more information. XR CHEST PORTABLE   Final Result   Stable abnormal chest with the mediastinal and hilar adenopathy with soft   tissue mass in the right hilum concerning for malignancy. Persistent bibasilar infiltrates and pleural effusion likely pneumonia or   edema. CTA CHEST W CONTRAST   Final Result   1. There is no evidence of a pulmonary embolus. 2. Moderate size right pleural effusion   3. Advanced emphysematous changes with superimposed pneumonia seen within the   left lower lobe and within the lingula. This finding is new when compared   with the previous CT scan of 06/11/2021 and PET-CT scan of 06/16/2021.   4. Large right perihilar mass with extension into the mediastinum consistent   with the history of metastatic small cell lung CA   5. Significant right hilar, mediastinal core right paratracheal and supra and   infraclavicular lymphadenopathy. 6. Pleural metastasis. Chronically distended esophagus secondary to the   metastatic lymphadenopathy within the inferior posterior mediastinum.          XR CHEST PORTABLE   Final Result   No significant change Assessment:  1. Large right pleural effusion: Likely malignant  2. Aspiration pneumonia, left side. It is unclear why a swallowing evaluation was done with the patient clearly has no esophageal lumen. 3. Esophageal occlusion: Secondary to likely malignant mediastinal adenopathy with external compression as seen above    Plan:  1. Open gastrostomy  2. Vest treatments to facilitate bronchopulmonary clearance, it is doing poorly now. Bronchoscopy may need to be a consideration. Time at the bedside, reviewing labs and radiographs, reviewing updated notes and consultations, discussing with staff and family was more than 45 minutes. Please note that voice recognition technology was used in the preparation of this note and make therefore it may contain inadvertent transcription errors. If the patient is a COVID 19 isolation patient, the above physical exam reflects that of the examining physician for the day. Arian Goodrich MD,  M.D., F.C.C.P.     [facility], [address] [place] [address], [address] [place] [address]

## 2021-06-26 NOTE — PROGRESS NOTES
Hepatobiliary and Pancreatic Surgery Progress Note    Chirichella covering for Delmar Sanders    CC: s/p surgery    Subjective: Patient states that his pain is controlled. OBJECTIVE      Physical    /76   Pulse 126   Temp 98.8 °F (37.1 °C) (Oral)   Resp 23   Ht 5' 7\" (1.702 m)   Wt 99 lb 13.9 oz (45.3 kg)   SpO2 97%   BMI 15.64 kg/m²       General appearance: appears in no acute distress  Lungs:respiratory effort normal without accessory numbers  Heart: no pedal edema  Abdomen: soft, nondistended, nontympanic, no guarding, no peritoneal signs, normoactive bowel sounds  Extremities: ROM normal    ASSESSMENT: s/p open gastrostomy secondary for need for nutrition from lung cancer    PLAN:    - okay to start trickle tube feeds today - do not advance pat 10mL/hr  - okay for meds  - pain control    Thank you for the consultation and allowing me to take part in Mr. Prather's care.       Jersey Gaitan MD 6/26/2021 9:49 AM

## 2021-06-26 NOTE — PROGRESS NOTES
Medical Intensive Care Unit      [facility]  Resident Progress Note    Date and time: 2021 11:21 AM  Patient's name:  Alex Foster  Medical Record Number: [id number]  Patient's YOB: 1960  Age: 62 y.o. Date of Admission: 2021  6:54 AM  Length of stay during current admission: 8  Primary Care Physician: Amado Mendoza DO    Code Status: Full Code    Reason for ICU admission: Hypotension, tachycardia, hypoxia    SUBJECTIVE:   Patient seen and examined at bedside. Patient remains tachycardic and did receive metoprolol x1 today.   He expresses that his pain has improved today    OVERNIGHT EVENTS:         : Tachycardia in the 140s overnight      CURRENT VENTILATION STATUS:     [] Ventilator  [] BIPAP  [x] Nasal Cannula [] Room Air      IF INTUBATED, ET TUBE MARKING AT LOWER LIP:       cms    SECRETIONS   Amount:  [x] Small [] Moderate  [] Large [x] None    Color:     [x] White [] Colored  [] Bloody    SEDATION:  RAAS Score:  [] Propofol gtt  [] Versed gtt  [] Ativan gtt   [] No Sedation    PARALYZED:  [x] No    [] Yes    VASOPRESSORS:  [x] No    [] Yes    If yes -   [] Levophed       [] Dopamine     [] Vasopressin       [] Dobutamine  [] Phenylephrine         [] Epinephrine    CENTRAL LINES:     [x] No   [] Yes   (Date of Insertion:   )           If yes -     [] Right IJ     [] Left IJ [] Right Femoral [] Left Femoral                   [] Right Subclavian [] Left Subclavian       PHILIPPE'S CATHETER:   [x] No   [] Yes  (Date of Insertion:   )     URINE OUTPUT:            [x] Good   [] Low              [] Anuric      OBJECTIVE:     VITAL SIGNS:  /81   Pulse 148   Temp 98.8 °F (37.1 °C) (Oral)   Resp 20   Ht 5' 7\" (1.702 m)   Wt 99 lb 13.9 oz (45.3 kg)   SpO2 95%   BMI 15.64 kg/m²   Tmax over 24 hours:  Temp (24hrs), Av.6 °F (37 °C), Min:98.1 °F (36.7 °C), Max:99.2 °F (37.3 °C)      Patient Vitals for the past 6 hrs:   BP Pulse Resp SpO2   21 1100 118/81 148 20 95 %   06/26/21 1000 96/74 132 21 96 %   06/26/21 0900 115/76 126 23 97 %   06/26/21 0800 107/77 125 23 93 %   06/26/21 0700 112/84 143 23 96 %   06/26/21 0600 104/72 146 19 98 %         Intake/Output Summary (Last 24 hours) at 6/26/2021 1121  Last data filed at 6/26/2021 0539  Gross per 24 hour   Intake 5052 ml   Output 1675 ml   Net 3377 ml     Wt Readings from Last 2 Encounters:   06/26/21 99 lb 13.9 oz (45.3 kg)   06/16/21 104 lb 11.2 oz (47.5 kg)     Body mass index is 15.64 kg/m².         PHYSICAL EXAMINATION:  General appearance -awake, alert, in no acute distress  Mental status - alert, oriented to person, place, and time  Eyes - pupils equal and reactive, extraocular eye movements intact  Ears - external ear canals normal  Nose - normal and patent, no erythema, discharge or polyps  Mouth - mucous membranes moist, pharynx normal without lesions  Neck - supple, no significant adenopathy  Chest -improving aeration of lung fields  Heart - normal rate, regular rhythm, normal S1, S2, no murmurs, rubs, clicks or gallops  Abdomen - soft, tender around PEG site, nondistended, no masses or organomegaly  Neurological - alert, oriented, normal speech, no focal findings or movement disorder noted  Extremities - peripheral pulses normal, no pedal edema, no clubbing or cyanosis  Skin - normal coloration and turgor, no rashes, no suspicious skin lesions noted      Any additional physical findings:    MEDICATIONS:    Scheduled Meds:   metoprolol tartrate  12.5 mg Oral BID    methylPREDNISolone  40 mg Intravenous Daily    cefTRIAXone (ROCEPHIN) IV  1,000 mg Intravenous Q24H    doxycycline (VIBRAMYCIN) IV  100 mg Intravenous Q12H    sodium chloride flush  5-40 mL Intravenous 2 times per day    enoxaparin  40 mg Subcutaneous Daily    famotidine  20 mg Oral BID    [Held by provider] nicotine  1 patch Transdermal Daily     Continuous Infusions:   sodium chloride 125 mL/hr at 06/26/21 0902    sodium chloride       PRN Meds:   levalbuterol, 0.63 mg, Q8H PRN  HYDROmorphone, 0.5 mg, Q3H PRN   Or  HYDROmorphone, 1 mg, Q3H PRN  naloxone, 0.4 mg, PRN  guaiFENesin, 400 mg, 4x Daily PRN  sodium chloride flush, 5-40 mL, PRN  sodium chloride, 25 mL, PRN  ondansetron, 4 mg, Q8H PRN   Or  ondansetron, 4 mg, Q6H PRN  polyethylene glycol, 17 g, Daily PRN  acetaminophen, 650 mg, Q6H PRN   Or  acetaminophen, 650 mg, Q6H PRN      VENT SETTINGS (Comprehensive) (if applicable):  Vent Information  SpO2: 95 %  Additional Respiratory  Assessments  Pulse: 148  Resp: 20  SpO2: 95 %    ABGs:   No results for input(s): PH, PCO2, PO2, HCO3, BE, O2SAT in the last 72 hours. Laboratory findings:    Complete Blood Count:   Recent Labs     06/25/21  1233 06/26/21  0545   WBC 2.3* 2.0*   HGB 11.3* 10.3*   HCT 36.2* 32.9*   PLT 90* 81*        Last 3 Blood Glucose:   Recent Labs     06/25/21  1233 06/26/21  0545   GLUCOSE 94 147*        PT/INR:    Lab Results   Component Value Date    PROTIME 13.5 06/22/2021    INR 1.2 06/22/2021     PTT:  No results found for: APTT, PTT    Comprehensive Metabolic Profile:   Recent Labs     06/25/21  1233 06/26/21  0545    138   K 4.4 3.8    102   CO2 27 27   BUN 19 15   CREATININE 0.4* 0.3*   GLUCOSE 94 147*   CALCIUM 8.0* 8.5*   PROT 6.5  --    LABALBU 2.9*  --    BILITOT 0.5  --    ALKPHOS 62  --    AST 17  --    ALT 8  --       Magnesium:   Lab Results   Component Value Date    MG 2.0 06/26/2021     Phosphorus:   Lab Results   Component Value Date    PHOS 2.1 06/26/2021     Ionized Calcium: No results found for: CAION     Troponin: No results for input(s): TROPONINI in the last 72 hours.     Microbiology:  Cultures during this admission:     Blood cultures:                 [x] None drawn      [] Negative             []  Positive (Details:  )  Urine Culture:                   [x] None drawn      [] Negative             []  Positive (Details:  )  Sputum Culture:               [x] None drawn [] Negative             []  Positive (Details:  )   Endotracheal aspirate:     [x] None drawn       [] Negative             []  Positive (Details:  )     Other pertinent Labs:       Radiology/Imaging:   CTA chest (6/26/2021): Impression   No evidence of pulmonary embolism.       Again noted are multiple areas of consolidation and scattered patchy   opacities, progressed since the previous exam and is concerning for   multifocal airspace disease process.       Moderate size right pleural effusion with pleural metastatic implants are   again noted.       Large right perihilar mass with extension to the mediastinum is again noted.       Significant mediastinal lymphadenopathy including supraclavicular lymph nodes   is again noted suggestive of disease extension.          ASSESSMENT  and PLAN:      Neurologic  Alert and oriented x3    Cardiovascular  Tachycardia  -Tachycardic in the 130s this morning, given 1 dose of metoprolol IV  -EKG showed sinus tach  -CTA chest was negative for PE  -Start metoprolol 12.5 mg twice daily  -Continue monitor     Hypotensive, resolved  -Hypotensive postop  -Given multiple fluid boluses and albumin once  -Pressures have improved since yesterday  -Continue monitor    Pulmonary  Acute hypoxic respiratory failure  -COPD exacerbation versus pneumonia  -Underwent right-sided thoracentesis on 6/22  -CTA chest negative for PE  -On Solu-Medrol wean down to 40 mg daily  -Transition from DuoNeb to Xopenex due to tachycardia  -Continue Rocephin and doxy  -Currently requiring 4 L nasal cannula  -Saturating at 96% on 4 L  -Continue monitor  -Wean oxygen as tolerated.  Keep O2 sat 90-92%    Gastrointestinal  Dysphagia  -Compression of the esophagus due to small cell lung cancer  -Underwent open gastrotomy yesterday  -Okay to start trickle feeds per general surgery  -Continue monitor  -General surgery consult appreciated    Infectious Disease  Community-acquired pneumonia  -Continue Rocephin and doxycycline  -Pro-Omer mildly elevated  -Continue monitor    Endocrine  No acute issues    Genitourinary/Renal  No acute needs    Hematology/Oncology    Anemia  -Hemoglobin 10.3  -Likely due to multiple surgeries and dilution due to fluid boluses  -Continue monitor       WEAN PER PROTOCOL:  [] No   [] Yes  [x] N/A    DISCONTINUE ANY LABS:   [x] No   [] Yes    ICU PROPHYLAXIS:  Stress ulcer:  [] PPI Agent  [x] Q1Lrfmj [] Sucralfate  [] Other:  VTE:   [x] Enoxaparin  [] Unfract. Heparin Subcut  [] EPC Cuffs    NUTRITION:  [] NPO [x] Tube Feeding (Specify: ) [] TPN  [] PO (Diet: Diet NPO)    HOME MEDICATIONS RECONCILED: [] No  [x] Yes    INSULIN DRIP:   [x] No   [] Yes    CONSULTATION NEEDED:  [] No   [x] Yes    FAMILY UPDATED:    [] No   [x] Yes    TRANSFER OUT OF ICU:   [] No   [x] Yes    ADDITIONAL PLAN:    Disposition: Transfer out of ICU to intermediate today        Svetlana Weaver DO     6/26/2021, 11:21 AM    I personally saw, examined and provided care for the patient. Radiographs, labs and medication list were reviewed by me independently. Review of Residents documentation was conducted and revisions were made as appropriate. I agree with the above documented exam, problem list and plan of care.         CCT excluding procedures 35 minutes    [facility], DO

## 2021-06-26 NOTE — PROGRESS NOTES
Comprehensive Nutrition Assessment    Type and Reason for Visit:   (new TF)    Nutrition Recommendations/Plan:  Continue Trickle feeds at this time  Pt at high risk for re-feeding syndrome 2/2 minimal nutrition >7d, monitor electrolytes/replace prn    TF rec when medically feasible:  Standard w/ fiber (Jevity 1.5) @ 40 ml/hr + 1 protein modular daily  Will provide 960 ml tv, 1440 kcals, 61 gm pro, 730 ml free water (1544 kcals & 87 gm pro w/ protein modular)  Regimen will meet 100% est nutrient needs    Estimated Daily Nutrient Needs:  Energy (kcal):  2836-8652; Weight Used for Energy Requirements:  Current     Protein (g):  70-80 (1.5-1.8 gm/kg CBW);  Weight Used for Protein Requirements:  Current        Fluid (ml/day):  4259-6242; Method Used for Fluid Requirements:  1 ml/kcal      Current Nutrition Therapies:    Diet NPO  Current Tube Feeding (TF) Orders:  · Feeding Route: PEG  · Formula: Standard with Fiber  · Schedule: Continuous (trickle feeds)  · Goal TF & Flush Orders Provides: @ 10 ml/hr; 240 ml tv, 360 kcals, 15 gm pro, 182 ml free water    Electronically signed by Gaby Grayson MS, RD, LD on 6/26/21 at 3:31 PM EDT    Contact: [phone]

## 2021-06-26 NOTE — CONSULTS
Palliative Care Department  Palliative Care Initial Consult  Provider: Viri Langley, NENA - CNP  [phone]    Hospital Day: 9  Date of Initial Consult: 6/25/2021  Referring Provider: Dr. Ambreen Iraheta was consulted for assistance with: Code status Discussion and Assist with goals of care    Chief Complaint: Alex Foster is a 62 y.o. male with chief complaint of SOB    HPI:   Alex Foster is a 62 y.o. male with significant medical history of recently diagnosed extensive small cell lung CA, COPD, continued tobacco use (40 PPY), known to Dr. Kash Martel, s/p initiation of chemotherapy with Cycle # 1 Carboplatin + Etoposide + Tecentriq on 06/16/2021, who was admitted on 6/18/2021 with worsening SOB and has received treatment for PNA, pleural effusion, and is s/p open gastrostomy placement due to esophageal compression. He had RRT following gastrostomy placement for tachycardia, hypotension, and hypoxia and was transferred to the ICU. Palliative has been asked to discuss goals of care and code status. ASSESSMENT/PLAN:     Pertinent Hospital Diagnoses:  Current medical issues leading to Palliative Medicine involvement include   Active Hospital Problems    Diagnosis Date Noted    Severe protein-calorie malnutrition ([facility] [address] [E43] 06/25/2021    Acute on chronic respiratory failure with hypoxia [facility]) [J96.21] 06/18/2021     Palliative Care Encounter / Counseling Regarding Goals of Care:  Please see detailed goals of care discussion as below.  At this time, Alex Foster, Does have capacity for medical decision-making. Capacity is time limited and situation/question specific.    The patient's daughter Vishal Angel would be surrogate medical decision-maker if needed   Outcome of goals of care meeting: live longer, improve or maintain function/quality of life and continue current management   Code status: Full Code  o I reviewed with the Patient that given advanced disease process, in the event of cardiac arrest, the chances of surviving may likely be low. It was also reviewed that if they survived a cardiac arrest, a return to prior level of function also may be low.  Advanced Directives: None   Surrogate/Legal NOK:   Tobias Allen () is the patient's daughter.  Will follow and continued to discuss goals of care pending clinical progression. Referrals: none today    SUBJECTIVE:   Events/Discussions:  6/26/2021:  Mr. Ruchi Cristobal was seen today, no family present. He is alert and oriented, able to voice his needs and concerns well. He complains of mild abdominal pain around his surgical site. He also complains of SOB, cough, weight loss and fatigue. I introduced the PM service and discussed the role of PM in his care, including support regarding goals of care, as well as symptomatic support in the outpatient setting. He wishes to continue with full supportive care and full code at this time. He plans to continue with treatment as directed by oncology. An outpatient referral to the pallietive clinic was made shortly before this admission and outpatient follow up will be arranged.     Past Medical History:   Diagnosis Date    Alcohol abuse     Cancer ([address]     Lung    Lung mass     right    Seasonal allergies     Tobacco abuse        Past Surgical History:   Procedure Laterality Date    BRONCHOSCOPY N/A 6/3/2021    BRONCHOSCOPY W/EBUS FNA performed by Leanne Rivero MD at [address] [place] [address] N/A 6/3/2021    BRONCHOSCOPY performed by Leanne Rivero MD at [address] [facility] N/A 6/25/2021    OPEN GASTROSTOMY TUBE performed by Patrice Rosales MD at [address] [facility] N/A 6/22/2021    MEDI PORT INSERTION performed by Patrice Rosales MD at [facility] [address] Right 5/17/2021    RIGHT THORACENTESIS ULTRASOUND performed by Leanne Rivero MD at [address] [facility] ENDOSCOPY N/A 6/24/2021    EGD DIAGNOSTIC ONLY performed by Cyndee Rasheed MD at [facility] ENDOSCOPY       Family History   Problem Relation Age of Onset    No Known Problems Mother     Alzheimer's Disease Father     Heart Attack Paternal Grandmother        No Known Allergies    ROS: UNLESS STATED ABOVE PATIENT DENIES:  CONSTITUTIONAL:  fever, chill, rigors, nausea, vomiting, fatigue. HEENT: blurry vision, double vision, hearing problem, tinnitus, hoarseness, dysphagia, odynophagia  RESPIRATORY: cough, shortness of breath, sputum expectoration. CARDIOVASCULAR:  Chest pain/pressure, palpitation, syncope, irregular beats  GASTROINTESTINAL:  abdominal or rectal pain, diarrhea, constipation, . GENITOURINARY:  Burning, frequency, urgency, incontinence, discharge  INTEGUMENTARY: rash, wound, pruritis  HEMATOLOGIC/LYMPHATIC:  Swelling, sores, gum bleeding, easy bruising, pica. MUSCULOSKELETAL:  pain, edema, joint swelling or redness  NEUROLOGICAL:  light headed, dizziness, loss of consciousness, weakness, change in memory, seizures, tremors    OBJECTIVE:   Prognosis: unknown    Physical Exam:  /76   Pulse 126   Temp 98.8 °F (37.1 °C) (Oral)   Resp 23   Ht 5' 7\" (1.702 m)   Wt 99 lb 13.9 oz (45.3 kg)   SpO2 97%   BMI 15.64 kg/m²     Gen:  Alert, ill appearing, in no acute distress  HEENT:  Normocephalic, conjunctiva pink, no drainage, mucosa moist  Neck:  Supple  Lungs:  Breath sounds course, wheeze noted  Heart: RRR, no murmur, rub, or gallop noted during exam  Abd: Soft, non tender, non distended, BS+, PEG  M/S/Ext:  Moving all extremities, no edema, pulses present  Skin:  Warm and dry  Neuro:  PERRL, Alert, oriented x 3; following commands    Objective data reviewed: labs, images, records, medication use, vitals and chart    Time/Communication:  Greater than 50% of time spent, total 70 minutes in counseling and coordination of care at the bedside regarding goals of care and see above.     [facility] Haydee Rios - [facility]  Palliative Medicine    Patient and the plan of care discussed with the other IDT members of Palliative Care Team, and with consultants and patient, as appropriate and available. Thank you for allowing Palliative Medicine to participate in the care of Jaymie Valdes. Note: This report was completed using computer[employer]ve[employer] voiced recognition software. Every effort has been made to ensure accuracy; however, inadvertent computerized transcription errors may be present.

## 2021-06-27 ENCOUNTER — APPOINTMENT (OUTPATIENT)
Dept: CT IMAGING | Age: 61
DRG: 951 | End: 2021-06-27
Payer: COMMERCIAL

## 2021-06-27 ENCOUNTER — APPOINTMENT (OUTPATIENT)
Dept: GENERAL RADIOLOGY | Age: 61
DRG: 951 | End: 2021-06-27
Payer: COMMERCIAL

## 2021-06-27 LAB
ANION GAP SERPL CALCULATED.3IONS-SCNC: 9 MMOL/L (ref 7–16)
BASOPHILS ABSOLUTE: 0 E9/L (ref 0–0.2)
BASOPHILS RELATIVE PERCENT: 0 % (ref 0–2)
BUN BLDV-MCNC: 16 MG/DL (ref 6–20)
CALCIUM SERPL-MCNC: 8.6 MG/DL (ref 8.6–10.2)
CHLORIDE BLD-SCNC: 99 MMOL/L (ref 98–107)
CO2: 28 MMOL/L (ref 22–29)
CREAT SERPL-MCNC: 0.3 MG/DL (ref 0.7–1.2)
EOSINOPHILS ABSOLUTE: 0.01 E9/L (ref 0.05–0.5)
EOSINOPHILS RELATIVE PERCENT: 0.5 % (ref 0–6)
GFR AFRICAN AMERICAN: >60
GFR NON-AFRICAN AMERICAN: >60 ML/MIN/1.73
GLUCOSE BLD-MCNC: 101 MG/DL (ref 74–99)
HCT VFR BLD CALC: 35.7 % (ref 37–54)
HEMOGLOBIN: 11.7 G/DL (ref 12.5–16.5)
IMMATURE GRANULOCYTES #: 0.02 E9/L
IMMATURE GRANULOCYTES %: 1.1 % (ref 0–5)
LYMPHOCYTES ABSOLUTE: 0.62 E9/L (ref 1.5–4)
LYMPHOCYTES RELATIVE PERCENT: 34.1 % (ref 20–42)
MAGNESIUM: 2.2 MG/DL (ref 1.6–2.6)
MCH RBC QN AUTO: 28.7 PG (ref 26–35)
MCHC RBC AUTO-ENTMCNC: 32.8 % (ref 32–34.5)
MCV RBC AUTO: 87.7 FL (ref 80–99.9)
MONOCYTES ABSOLUTE: 0.18 E9/L (ref 0.1–0.95)
MONOCYTES RELATIVE PERCENT: 9.9 % (ref 2–12)
MRSA CULTURE ONLY: NORMAL
NEUTROPHILS ABSOLUTE: 0.99 E9/L (ref 1.8–7.3)
NEUTROPHILS RELATIVE PERCENT: 54.4 % (ref 43–80)
PDW BLD-RTO: 14 FL (ref 11.5–15)
PHOSPHORUS: 1.8 MG/DL (ref 2.5–4.5)
PLATELET # BLD: 80 E9/L (ref 130–450)
PLATELET CONFIRMATION: NORMAL
PMV BLD AUTO: 8.5 FL (ref 7–12)
POTASSIUM SERPL-SCNC: 3.8 MMOL/L (ref 3.5–5)
RBC # BLD: 4.07 E12/L (ref 3.8–5.8)
RBC # BLD: NORMAL 10*6/UL
SODIUM BLD-SCNC: 136 MMOL/L (ref 132–146)
WBC # BLD: 1.8 E9/L (ref 4.5–11.5)

## 2021-06-27 PROCEDURE — 83735 ASSAY OF MAGNESIUM: CPT

## 2021-06-27 PROCEDURE — 70450 CT HEAD/BRAIN W/O DYE: CPT

## 2021-06-27 PROCEDURE — 2060000000 HC ICU INTERMEDIATE R&B

## 2021-06-27 PROCEDURE — 84100 ASSAY OF PHOSPHORUS: CPT

## 2021-06-27 PROCEDURE — 74018 RADEX ABDOMEN 1 VIEW: CPT

## 2021-06-27 PROCEDURE — 6360000002 HC RX W HCPCS: Performed by: STUDENT IN AN ORGANIZED HEALTH CARE EDUCATION/TRAINING PROGRAM

## 2021-06-27 PROCEDURE — 6360000002 HC RX W HCPCS: Performed by: FAMILY MEDICINE

## 2021-06-27 PROCEDURE — 80048 BASIC METABOLIC PNL TOTAL CA: CPT

## 2021-06-27 PROCEDURE — 2500000003 HC RX 250 WO HCPCS: Performed by: INTERNAL MEDICINE

## 2021-06-27 PROCEDURE — 2500000003 HC RX 250 WO HCPCS: Performed by: STUDENT IN AN ORGANIZED HEALTH CARE EDUCATION/TRAINING PROGRAM

## 2021-06-27 PROCEDURE — 6360000002 HC RX W HCPCS: Performed by: NURSE PRACTITIONER

## 2021-06-27 PROCEDURE — 94640 AIRWAY INHALATION TREATMENT: CPT

## 2021-06-27 PROCEDURE — 85025 COMPLETE CBC W/AUTO DIFF WBC: CPT

## 2021-06-27 PROCEDURE — 99233 SBSQ HOSP IP/OBS HIGH 50: CPT | Performed by: STUDENT IN AN ORGANIZED HEALTH CARE EDUCATION/TRAINING PROGRAM

## 2021-06-27 PROCEDURE — 2580000003 HC RX 258: Performed by: STUDENT IN AN ORGANIZED HEALTH CARE EDUCATION/TRAINING PROGRAM

## 2021-06-27 PROCEDURE — 94668 MNPJ CHEST WALL SBSQ: CPT

## 2021-06-27 PROCEDURE — 2700000000 HC OXYGEN THERAPY PER DAY

## 2021-06-27 PROCEDURE — 36415 COLL VENOUS BLD VENIPUNCTURE: CPT

## 2021-06-27 PROCEDURE — 99233 SBSQ HOSP IP/OBS HIGH 50: CPT | Performed by: INTERNAL MEDICINE

## 2021-06-27 RX ORDER — LEVALBUTEROL INHALATION SOLUTION 0.63 MG/3ML
0.63 SOLUTION RESPIRATORY (INHALATION) EVERY 4 HOURS PRN
Status: DISCONTINUED | OUTPATIENT
Start: 2021-06-27 | End: 2021-07-02 | Stop reason: HOSPADM

## 2021-06-27 RX ORDER — DILTIAZEM HYDROCHLORIDE 5 MG/ML
10 INJECTION INTRAVENOUS ONCE
Status: COMPLETED | OUTPATIENT
Start: 2021-06-27 | End: 2021-06-27

## 2021-06-27 RX ADMIN — LEVALBUTEROL HYDROCHLORIDE 0.63 MG: 0.63 SOLUTION RESPIRATORY (INHALATION) at 12:21

## 2021-06-27 RX ADMIN — DOXYCYCLINE 100 MG: 100 INJECTION, POWDER, LYOPHILIZED, FOR SOLUTION INTRAVENOUS at 16:00

## 2021-06-27 RX ADMIN — WATER 1000 MG: 1 INJECTION INTRAMUSCULAR; INTRAVENOUS; SUBCUTANEOUS at 10:10

## 2021-06-27 RX ADMIN — DOXYCYCLINE 100 MG: 100 INJECTION, POWDER, LYOPHILIZED, FOR SOLUTION INTRAVENOUS at 03:02

## 2021-06-27 RX ADMIN — DILTIAZEM HYDROCHLORIDE 10 MG: 5 INJECTION INTRAVENOUS at 06:43

## 2021-06-27 RX ADMIN — DEXTROSE MONOHYDRATE 15 MG/HR: 50 INJECTION, SOLUTION INTRAVENOUS at 16:02

## 2021-06-27 RX ADMIN — DIGOXIN 250 MCG: 250 INJECTION, SOLUTION INTRAMUSCULAR; INTRAVENOUS; PARENTERAL at 10:10

## 2021-06-27 RX ADMIN — ENOXAPARIN SODIUM 50 MG: 60 INJECTION SUBCUTANEOUS at 16:10

## 2021-06-27 RX ADMIN — METHYLPREDNISOLONE SODIUM SUCCINATE 40 MG: 40 INJECTION, POWDER, LYOPHILIZED, FOR SOLUTION INTRAMUSCULAR; INTRAVENOUS at 10:10

## 2021-06-27 RX ADMIN — Medication 10 ML: at 10:11

## 2021-06-27 RX ADMIN — Medication 10 ML: at 20:39

## 2021-06-27 RX ADMIN — DEXTROSE MONOHYDRATE 5 MG/HR: 50 INJECTION, SOLUTION INTRAVENOUS at 06:51

## 2021-06-27 RX ADMIN — DIGOXIN 250 MCG: 250 INJECTION, SOLUTION INTRAMUSCULAR; INTRAVENOUS; PARENTERAL at 03:00

## 2021-06-27 RX ADMIN — LEVALBUTEROL HYDROCHLORIDE 0.63 MG: 0.63 SOLUTION RESPIRATORY (INHALATION) at 16:06

## 2021-06-27 ASSESSMENT — PAIN SCALES - GENERAL
PAINLEVEL_OUTOF10: 0

## 2021-06-27 NOTE — PROGRESS NOTES
[facility] Progress Note    Admitting Date and Time: 6/18/2021  6:54 AM  Admit Dx: Acute on chronic respiratory failure with hypoxia (HCC) [J96.21]    Subjective:  Patient is being followed for Acute on chronic respiratory failure with hypoxia ([address] [J96.21]   Pt denies any chest pain, palpitations, reports some cough and congestion, sounds very congested, wanted to leave to attend his son's graduation. Per RN: Patient had a mechanical fall last night, did not hit his head though, he pulled out his PEG tube,  remains tachycardic though improved yesterday, requiring more oxygen. ROS: denies fever, chills, cp, sob, n/v, HA unless stated above.      enoxaparin  1 mg/kg Subcutaneous Daily    metoprolol tartrate  25 mg Oral BID    methylPREDNISolone  40 mg Intravenous Daily    cefTRIAXone (ROCEPHIN) IV  1,000 mg Intravenous Q24H    doxycycline (VIBRAMYCIN) IV  100 mg Intravenous Q12H    sodium chloride flush  5-40 mL Intravenous 2 times per day    famotidine  20 mg Oral BID    [Held by provider] nicotine  1 patch Transdermal Daily     levalbuterol, 0.63 mg, Q8H PRN  perflutren lipid microspheres, 1.5 mL, ONCE PRN  HYDROmorphone, 0.5 mg, Q3H PRN   Or  HYDROmorphone, 1 mg, Q3H PRN  naloxone, 0.4 mg, PRN  guaiFENesin, 400 mg, 4x Daily PRN  sodium chloride flush, 5-40 mL, PRN  sodium chloride, 25 mL, PRN  ondansetron, 4 mg, Q8H PRN   Or  ondansetron, 4 mg, Q6H PRN  polyethylene glycol, 17 g, Daily PRN  acetaminophen, 650 mg, Q6H PRN   Or  acetaminophen, 650 mg, Q6H PRN         Objective:    /71   Pulse 130   Temp 97.8 °F (36.6 °C) (Temporal)   Resp 22   Ht 5' 7\" (1.702 m)   Wt 110 lb (49.9 kg)   SpO2 96%   BMI 17.23 kg/m²     General Appearance: alert and oriented to person, place and time and  8 L oxygen by nasal cannula, frail  Skin: warm and dry  Head: normocephalic and atraumatic  Eyes: pupils equal, round, and reactive to light, extraocular eye movements intact, conjunctivae normal  Neck: neck supple and non tender without mass   Pulmonary/Chest: diminished bilaterally Rt > Left - + wheezes, rales or rhonchi, normal air movement, no respiratory distress  Cardiovascular: Tachycardic, normal S1 and S2 and no carotid bruits  Abdomen: soft, non-tender, non-distended, normal bowel sounds, no masses or organomegaly  Extremities: no cyanosis, no clubbing and no edema  Neurologic: no cranial nerve deficit and speech normal      Recent Labs     06/25/21  1233 06/26/21  0545 06/27/21  0245    138 136   K 4.4 3.8 3.8    102 99   CO2 27 27 28   BUN 19 15 16   CREATININE 0.4* 0.3* 0.3*   GLUCOSE 94 147* 101*   CALCIUM 8.0* 8.5* 8.6       Recent Labs     06/25/21  1233 06/26/21  0545 06/27/21  0245   WBC 2.3* 2.0* 1.8*   RBC 3.99 3.64* 4.07   HGB 11.3* 10.3* 11.7*   HCT 36.2* 32.9* 35.7*   MCV 90.7 90.4 87.7   MCH 28.3 28.3 28.7   MCHC 31.2* 31.3* 32.8   RDW 14.5 14.4 14.0   PLT 90* 81* 80*   MPV 8.3 8.7 8.5       Micro:  No components found for: [facility])    Radiology:   No results found. Assessment:    Active Problems:    Small cell lung carcinoma (HCC)    Acute on chronic respiratory failure with hypoxia (HCC)    Severe protein-calorie malnutrition (HCC)    Typical atrial flutter (HCC)    Pneumonia of left lower lobe due to infectious organism    Pleural effusion  Resolved Problems:    * No resolved hospital problems. *      Plan:  1. Acute on Chronic Hypoxic  Respiratory Failure with underlying lung cancer, emphysematous changes, right pleural effusion -respiratory panel negative, continue respiratory support with oxygen and nebs. On Vest therapy. pulmonary and Oncology consulted. Status post right subclavian Mediport insertion 6/22. RRT - 6/25 - Hypotension -postop 6/25 -patient with a BMI of 16 has received ketamine, fentanyl, Dilaudid, etomidate, Versed, bupivacaine epinephrine block, Demerol.  status post Narcan, 1750 cc of fluid bolus, was on continuous 150 cc now dced , 1 dose midodrine 10 mg. CTA negative for PE, lactic acid normal, Postop (open  gastrostomy) CT abdomen obtained-suggestive of Extensive postoperative free air in the abdomen. Air also identified in the anterior abdominal wall subcutaneous tissues. Findings expected postop finding. General surgery on board. 2.  Small Cell Lung Cancer - Oncology  & palliative consult. 3.  Tobacco Dependence - Nicoderm 21 mg patch daily. 4.  Hoarseness - nystatin suspension qid.   5.  Acute COPD exacerbation with possible postobstructive pneumonia/aspiration pneumonia- IR guided thoracocentesis 5/22, on IV Rocephin and doxy. On IV steroids wean as tolerated, incentive spirometry, plan for possible bronchoscopy. 6.  Dysphagia s/p open gastrostomy 6/26 - secondary to mediastinal lymphadenopathy, likely distended esophagus, general surgery on board, EGD was attempted 6/24 for PEG placement but extrinsic compression on the esophagus prevented the scope advancement. Consulted speech, failed swallow study. 7.    New onset atrial flutter 2:1-EKG reviewed, TSH normal, s/p metoprolol 5 mg. Started on metoprolol 25 mg twice daily, consulted cardiology -recommend  full dose anticoagulation, digoxin, diltiazem, [id number] 0 but due to underlying malignancy risk for clotting high. Not a candidate for HARPER cardioversion. 8.  Thrombocytopenia -consulted oncology for recommendations on full dose anticoagulation. 9.  Transient confusion/intentional agitation -pulled out his PEG tube 6/27 -will monitor closely if needed will get brain imaging as patient is on anticoagulation and new onset arrhythmia. DVT prophylaxis - Lovenox full dose  Social - will need home oxygen and home health care on discharge. Palliative care on board, CODE STATUS readdressed with patient, patient wants to remain full code, has a daughter but he takes his own decision.   Patient has a poor insight, doubtful understands prognosis, wanted to eat right after PEG tube placement, later pulled out PEG tube 6/27, wanted to attend his son's graduation. Case discussed with elder son at bedside, patient is his own medical decision maker. Poor and guarded prognosis. NOTE: This report was transcribed using voice recognition software. Every effort was made to ensure accuracy; however, inadvertent computerized transcription errors may be present.   Electronically signed by Nitish Estrada MD on 6/27/2021 at 12:35 PM

## 2021-06-27 NOTE — PROGRESS NOTES
Patient refused both prn nebulizer and chest vest therapy. RN attempting to talk patient into treatment since sounds very most with some distress. \"What part of no do you not understand? \" Told RN that he would take in 15 minutes. Upon returning, patient again refused.  RN notified of second refusal.

## 2021-06-27 NOTE — PROGRESS NOTES
Notified Dr. Michelle Giraldo about abnormal g tube placement results post placement (confirmed by xray of abdomen). He said to leave PEG in and it would be addressed by team in the morning.

## 2021-06-27 NOTE — PROGRESS NOTES
Pulmonary Progress Note for Jasmina    Admit Date: 2021  Hospital day                               PCP: Justen Gutierrez DO    Chief Complaint (s):  Patient Active Problem List   Diagnosis    Acute non-recurrent maxillary sinusitis    [facility] [address]    Acute on chronic respiratory failure with hypoxia ([facility] [address]    Severe protein-calorie malnutrition (HCC)    Typical atrial flutter (HCC)    Pneumonia of left lower lobe due to infectious organism    Pleural effusion       Subjective:  · Discussed with the primary service. This p.m., now at least, the patient is resting comfortably. With his confusion, metastasis to the brain is a primary concern. With his atrial fibrillation and anticoagulation, this could be even more problematic. With a vest and Yankauer suction, he seems to be doing better with his secretions precluding the need for a bronchoscopy at least for now. A Gastrografin study suggests that the gastrostomy tube is in an abnormal position. Tube feedings are held.       Vitals:  VITALS:  /71   Pulse 89   Temp 97.8 °F (36.6 °C) (Temporal)   Resp 22   Ht 5' 7\" (1.702 m)   Wt 110 lb (49.9 kg)   SpO2 96%   BMI 17.23 kg/m²     24HR INTAKE/OUTPUT:      Intake/Output Summary (Last 24 hours) at 2021 1456  Last data filed at 2021 1230  Gross per 24 hour   Intake 0 ml   Output 1400 ml   Net -1400 ml       24HR PULSE OXIMETRY RANGE:    SpO2  Av.8 %  Min: 90 %  Max: 96 %    Medications:  IV:   dilTIAZem 15 mg/hr (21 1217)    sodium chloride         Scheduled Meds:   enoxaparin  1 mg/kg Subcutaneous Daily    metoprolol tartrate  25 mg Oral BID    methylPREDNISolone  40 mg Intravenous Daily    cefTRIAXone (ROCEPHIN) IV  1,000 mg Intravenous Q24H    doxycycline (VIBRAMYCIN) IV  100 mg Intravenous Q12H    sodium chloride flush  5-40 mL Intravenous 2 times per day    famotidine  20 mg Oral BID    [Held by provider] nicotine  1 patch Transdermal Daily       Diet:   Diet NPO     EXAM:  General: No distress. Sleeping. Cachectic  Eyes: PERRL. No sclera icterus. No conjunctival injection. ENT: No discharge. Pharynx clear. Neck: Trachea midline. Normal thyroid. Resp: No accessory muscle use. LLL rales. No wheezing. Few rhonchi. CV: Regular rate. Regular rhythm. No murmur or rub. Abd: Binder in place  Skin: Warm and dry. No nodule on exposed extremities. No rash on exposed extremities. Ext: No cyanosis, clubbing, edema  Lymph: No cervical LAD. No supraclavicular LAD. M/S: No cyanosis. No joint deformity. Significant clubbing. Neuro: Awake. Follows commands. Positive pupils/gag/corneals. Normal pain response. Results:  CBC:   Recent Labs     06/25/21  1233 06/26/21  0545 06/27/21  0245   WBC 2.3* 2.0* 1.8*   HGB 11.3* 10.3* 11.7*   HCT 36.2* 32.9* 35.7*   MCV 90.7 90.4 87.7   PLT 90* 81* 80*     BMP:   Recent Labs     06/25/21  1233 06/26/21  0545 06/27/21  0245    138 136   K 4.4 3.8 3.8    102 99   CO2 27 27 28   PHOS  --  2.1* 1.8*   BUN 19 15 16   CREATININE 0.4* 0.3* 0.3*     LIVER PROFILE:   Recent Labs     06/25/21  1233   AST 17   ALT 8   BILITOT 0.5   ALKPHOS 62     PT/INR:   No results for input(s): PROTIME, INR in the last 72 hours. APTT: No results for input(s): APTT in the last 72 hours. Pathology:  1. N/A      Microbiology:  1. None    Recent ABG:   No results for input(s): PH, PO2, PCO2, HCO3, BE, O2SAT, METHB, O2HB, COHB, O2CON, HHB, THB in the last 72 hours. Recent Films:  XR ABDOMEN FOR NG/OG/NE TUBE PLACEMENT   Final Result   Extravasation of contrast following Gastrografin injection suggesting   abnormal position of gastrostomy tube. XR ABDOMEN FOR NG/OG/NE TUBE PLACEMENT   Final Result   Tip of the gastric tube in proper position. Recent postoperative changes as above commented. CTA CHEST W CONTRAST   Final Result   No evidence of pulmonary embolism.       Again noted are multiple areas of consolidation and scattered patchy   opacities, progressed since the previous exam and is concerning for   multifocal airspace disease process. Moderate size right pleural effusion with pleural metastatic implants are   again noted. Large right perihilar mass with extension to the mediastinum is again noted. Significant mediastinal lymphadenopathy including supraclavicular lymph nodes   is again noted suggestive of disease extension. CT ABDOMEN PELVIS WO CONTRAST Additional Contrast? None   Final Result   Extensive postoperative free air in the abdomen. Air also identified in the anterior abdominal wall subcutaneous tissues. Consolidation in the bilateral lung bases may be secondary to atelectasis   postoperative versus superimposed pneumonia in the proper clinical setting. Partially imaged right-sided pleural effusion. FL MODIFIED BARIUM SWALLOW W VIDEO   Final Result   Positive aspiration to thin and nectar consistencies of barium contrast were. Please see separate speech pathology report for full discussion of findings   and recommendations. US THORACENTESIS Which side should the procedure be performed? Right   Final Result   Successful ultrasound guided thoracentesis. XR CHEST PORTABLE   Final Result   Right-sided chest port in good position. No pneumothorax. Moderate right-sided pleural effusions unchanged. FLUORO FOR SURGICAL PROCEDURES   Final Result   Intraprocedural fluoroscopic spot images as above. See separate procedure   report for more information. XR CHEST PORTABLE   Final Result   Stable abnormal chest with the mediastinal and hilar adenopathy with soft   tissue mass in the right hilum concerning for malignancy. Persistent bibasilar infiltrates and pleural effusion likely pneumonia or   edema. CTA CHEST W CONTRAST   Final Result   1.  There is no evidence of a pulmonary embolus. 2. Moderate size right pleural effusion   3. Advanced emphysematous changes with superimposed pneumonia seen within the   left lower lobe and within the lingula. This finding is new when compared   with the previous CT scan of 06/11/2021 and PET-CT scan of 06/16/2021.   4. Large right perihilar mass with extension into the mediastinum consistent   with the history of metastatic small cell lung CA   5. Significant right hilar, mediastinal core right paratracheal and supra and   infraclavicular lymphadenopathy. 6. Pleural metastasis. Chronically distended esophagus secondary to the   metastatic lymphadenopathy within the inferior posterior mediastinum. XR CHEST PORTABLE   Final Result   No significant change             Assessment:  1. Large right pleural effusion: Likely malignant, cytology either is pending but was not sent. 2. Aspiration pneumonia, left side. It is unclear why a swallowing evaluation was done with the patient clearly has no esophageal lumen. 3. Esophageal occlusion: Secondary to likely malignant mediastinal adenopathy with external compression as seen above  4. Confusion: Worrisome for brain metastasis  5. Abnormal placed in G-tube: It was replaced after the patient pulled his original tube out despite it being well sutured in place per the operative note. Plan:  1. Continue aerosol treatments and vest treatments. 2. Replace the G-tube  3. Overall prognosis is grim. Time at the bedside, reviewing labs and radiographs, reviewing updated notes and consultations, discussing with staff and family was more than 45 minutes. Please note that voice recognition technology was used in the preparation of this note and make therefore it may contain inadvertent transcription errors. If the patient is a COVID 19 isolation patient, the above physical exam reflects that of the examining physician for the day. Lisseth Denson MD,  M.D., F.C.C.P.     [facility] [address] [facility], [address], [address], [place] [facility] [place]

## 2021-06-27 NOTE — PROGRESS NOTES
Patient refuses to do MRI of brain. Says he can not lay flat for extended amount of time and is unwilling to do scan r/t recent fall and confusion. Notified Dr. Ric Maldonado. Will cancel scan.

## 2021-06-27 NOTE — PROGRESS NOTES
Inpatient Medical Oncology Progress Note    Subjective:  No fever. Fatigue. SOB. Objective:  BP 93/71   Pulse 148   Temp 96.8 °F (36 °C) (Axillary)   Resp 22   Ht 5' 7\" (1.702 m)   Wt 99 lb 13.9 oz (45.3 kg)   SpO2 95%   BMI 15.64 kg/m²   GENERAL: Alert, oriented x 3, tired  HEENT: PERRLA; EOMI. Oropharynx clear. NECK: Supple. Without lymphadenopathy. LUNGS: scattered wheezing tangela  CARDIOVASCULAR: Regular rate. No murmurs, rubs or gallops. ABDOMEN: Soft. Non-tender, non-distended. Positive bowel sounds. EXTREMITIES: Without clubbing, cyanosis, or edema. NEUROLOGIC: No focal deficits.    ECOG PS 2    Diagnostics:  Lab Results   Component Value Date    WBC 2.0 (L) 06/26/2021    HGB 10.3 (L) 06/26/2021    HCT 32.9 (L) 06/26/2021    MCV 90.4 06/26/2021    PLT 81 (L) 06/26/2021     Lab Results   Component Value Date     06/26/2021    K 3.8 06/26/2021     06/26/2021    CO2 27 06/26/2021    BUN 15 06/26/2021    CREATININE 0.3 (L) 06/26/2021    GLUCOSE 147 (H) 06/26/2021    CALCIUM 8.5 (L) 06/26/2021    PROT 6.5 06/25/2021    LABALBU 2.9 (L) 06/25/2021    BILITOT 0.5 06/25/2021    ALKPHOS 62 06/25/2021    AST 17 06/25/2021    ALT 8 06/25/2021    LABGLOM >60 06/26/2021    GFRAA >60 06/26/2021     Impression/Plan:  62 y.o male with Extensive SCLC     PET/CT on 06/15/2021 Mass centered in the right hilar region extending into the mediastinum consistent with history of small cell lung cancer with metabolically active metastatic mediastinal lymphadenopathy extending into the bilateral supraclavicular region and inferiorly into the retrocrural region.   Metabolically active right pleural metastases with corresponding pleural effusion  Metabolically active nodules in the skin of the abdomen are concerning for metastatic disease.   The esophagus is distended which appears to be secondary to compression by lower mediastinal lymphadenopathy.   Intense activity in the collapsed right lower lobe could be due to additional malignancy or postobstructive pneumonia.      Extensive SCLC, Recommended systemic therapy consisting of Carboplatin + Etoposide + Tecentriq. Side effects reviewed. He agreed to proceed.   Cycle # 1 Carboplatin + Etoposide + Tecentriq was on 06/16/2021  He had presented to the hospital on 06/18/2021 for worsening shortness of breath. CTA chest noted no PE. Moderate right pleural effusion  Advanced emphysematous changes with superimposed pneumonia seen within the with the previous CT scan of 06/11/2021 and PET-CT scan of 06/16/2021  Large right perihilar mass with extension into the mediastinum  Significant right hilar mediastinal core right paratracheal and supra and infraclavicular LN  Pleural metastasis. Chronically distended esophagus   The patient was admitted with acute COPD exacerbation, possible postobstructive pneumonia  The patient is doing better clinically. Pulmonary team on board  Vest treatments to facilitate bronchopulmonary clearance  IR guided right thoracentesis 06/22/2021; A total of 1200 cc of pleural effusion was removed  Continue IV steroids, nebs. Continue antibiotics  Continue to monitor his CBCD. Right subclavian Mediport 06/22/2021  Speech on board, failed swallow study. EGD was attempted 6/24 for PEG placement but extrinsic compression on the esophagus prevented the scope advancement. Open gastrostomy tube 06/25/2021. General surgery following  Will continue to follow.     06/26/2021  Annia Mooney MD

## 2021-06-27 NOTE — PROGRESS NOTES
INPATIENT CARDIOLOGY FOLLOW-UP    Name: Gavin Johns    Age: 62 y.o. Date of Admission: 6/18/2021  6:54 AM    Date of Service: 6/27/2021    Primary Cardiologist: Known to me from this admission    Chief Complaint: Follow-up for new onset atrial flutter    Interim History:  Overnight events noted. Patient fell early this morning, apparently was confused found off oxygen on the floor, had dislodged his G-tube. X-ray this morning confirms it is still in good position. Persistently tachycardic throughout the day yesterday and this morning, eventually started on diltiazem infusion by on-call cardiology and around 7 AM heart rate started to come down to the 110s to 120s. Review of Systems:   Negative except as described above    Problem List:  Patient Active Problem List   Diagnosis    Acute non-recurrent maxillary sinusitis    [facility] cell [facility] ([facility] [address]    Acute on chronic respiratory failure with hypoxia (HCC)    Severe protein-calorie malnutrition (HCC)    Typical atrial flutter (HCC)    Pneumonia of left lower lobe due to infectious organism    Pleural effusion       Current Medications:    Current Facility-Administered Medications:     dilTIAZem 100 mg in dextrose 5 % 100 mL infusion ([facility]), 5-15 mg/hr, Intravenous, Continuous, Arch Tim, DO, Last Rate: 5 mL/hr at 06/27/21 0651, 5 mg/hr at 06/27/21 0651    enoxaparin (LOVENOX) injection 50 mg, 1 mg/kg, Subcutaneous, Daily, Jewell Guzman. KAVYA Apple    metoprolol tartrate (LOPRESSOR) tablet 25 mg, 25 mg, Oral, BID, Angie Betancourt MD    levalbuterol [facility]) nebulization 0.63 mg, 0.63 mg, Nebulization, Q8H PRN, Rhona Shove V, DO, 0.63 mg at 06/26/21 1608    perflutren lipid microspheres (DEFINITY) injection 1.65 mg, 1.5 mL, Intravenous, ONCE PRN, Jewell Guzman. KAVYA Apple    digoxin (LANOXIN) injection 250 mcg, 250 mcg, Intravenous, 4 times per day, Jewell Guzman.  KAVYA Apple, 250 mcg at 06/27/21 0300    HYDROmorphone (DILAUDID) injection 0.5 mg, 0.5 mg, Intravenous, Q3H PRN, 0.5 mg at 06/26/21 0403 **OR** HYDROmorphone (DILAUDID) injection 1 mg, 1 mg, Intravenous, Q3H PRN, Shoaib Salcedo MD    naloxone [facility]) injection 0.4 mg, 0.4 mg, Intravenous, PRN, Shereen Blankenship MD    methylPREDNISolone sodium (SOLU-MEDROL) injection 40 mg, 40 mg, Intravenous, Daily, Shoaib Salcedo MD, 40 mg at 06/26/21 1023    guaiFENesin tablet 400 mg, 400 mg, Oral, 4x Daily PRN, Shoaib Salcedo MD, 400 mg at 06/22/21 2133    cefTRIAXone (ROCEPHIN) 1,000 mg in sterile water 10 mL IV syringe, 1,000 mg, Intravenous, Q24H, Shoaib Salcedo MD, 1,000 mg at 06/26/21 1023    doxycycline (VIBRAMYCIN) 100 mg in dextrose 5 % 100 mL IVPB, 100 mg, Intravenous, Q12H, Shoaib Salcedo MD, Stopped at 06/27/21 0402    sodium chloride flush 0.9 % injection 5-40 mL, 5-40 mL, Intravenous, 2 times per day, Shoaib Salcedo MD, 10 mL at 06/26/21 2052    sodium chloride flush 0.9 % injection 5-40 mL, 5-40 mL, Intravenous, PRN, Shoaib Salcedo MD, 10 mL at 06/21/21 1034    0.9 % sodium chloride infusion, 25 mL, Intravenous, PRN, Shoaib Salcedo MD    ondansetron (ZOFRAN-ODT) disintegrating tablet 4 mg, 4 mg, Oral, Q8H PRN, 4 mg at 06/20/21 0859 **OR** ondansetron (ZOFRAN) injection 4 mg, 4 mg, Intravenous, Q6H PRN, Shoaib Salcedo MD    polyethylene glycol [facility]) packet 17 g, 17 g, Oral, Daily PRN, Shoaib Salcedo MD    acetaminophen (TYLENOL) tablet 650 mg, 650 mg, Oral, Q6H PRN **OR** acetaminophen (TYLENOL) suppository 650 mg, 650 mg, Rectal, Q6H PRN, Shoaib Salcedo MD    famotidine (PEPCID) tablet 20 mg, 20 mg, Oral, BID, Shoaib Salcedo MD, 20 mg at 06/26/21 2052    [Held by provider] nicotine (NICODERM CQ) 21 MG/24HR 1 patch, 1 patch, Transdermal, Daily, Shoaib Salcedo MD, 1 patch at 06/25/21 1727    Physical Exam:  BP (!) 127/91   Pulse 158   Temp 98.7 °F (37.1 °C) (Oral)   Resp 20   Ht 5' 7\" (1.702 m)   Wt 110 lb (49.9 kg)   SpO2 90%   BMI 17.23 kg/m²   Wt Readings from Last 3 Encounters:   06/27/21 110 lb (49.9 kg)   06/16/21 104 lb 11.2 oz (47.5 kg)   06/14/21 106 lb 3.2 oz (48.2 kg)     Appearance: Ill-appearing gentleman, cachectic. Gurgling in his own secretions, difficult to understand speech  Skin: Intact, no rash  Head: Normocephalic, atraumatic  Eyes: EOMI, no conjunctival erythema  ENMT: No pharyngeal erythema, MMM, no rhinorrhea  Neck: Supple, no elevated JVP, no carotid bruits  Lungs: Rhonchi everywhere  Cardiac: Unable to hear heart tones because the noisy breath sounds  Abdomen: Surgical incision and G tube covered  Extremities: Moves all extremities x 4, skinny legs, no lower extremity edema  Neurologic: No focal motor deficits apparent, normal mood and affect  Peripheral Pulses: Intact posterior tibial pulses bilaterally    Intake/Output:    Intake/Output Summary (Last 24 hours) at 6/27/2021 0906  Last data filed at 6/27/2021 0302  Gross per 24 hour   Intake --   Output 950 ml   Net -950 ml     No intake/output data recorded.     Laboratory Tests:  Recent Labs     06/25/21  1233 06/26/21  0545 06/27/21  0245    138 136   K 4.4 3.8 3.8    102 99   CO2 27 27 28   BUN 19 15 16   CREATININE 0.4* 0.3* 0.3*   GLUCOSE 94 147* 101*   CALCIUM 8.0* 8.5* 8.6     Lab Results   Component Value Date    MG 2.2 06/27/2021     Recent Labs     06/25/21  1233   ALKPHOS 62   ALT 8   AST 17   PROT 6.5   BILITOT 0.5   LABALBU 2.9*     Recent Labs     06/25/21  1233 06/26/21  0545 06/27/21  0245   WBC 2.3* 2.0* 1.8*   RBC 3.99 3.64* 4.07   HGB 11.3* 10.3* 11.7*   HCT 36.2* 32.9* 35.7*   MCV 90.7 90.4 87.7   MCH 28.3 28.3 28.7   MCHC 31.2* 31.3* 32.8   RDW 14.5 14.4 14.0   PLT 90* 81* 80*   MPV 8.3 8.7 8.5     Lab Results   Component Value Date    TROPONINI <0.01 05/07/2021     Lab Results   Component Value Date    INR 1.2 06/22/2021    INR 1.1 05/07/2021    PROTIME 13.5 (H) 06/22/2021    PROTIME 11.7 05/07/2021     Lab Results   Component Value Date    TSH 3.550 2021     Lab Results   Component Value Date    LABA1C 5.7 (H) 2021     No results found for: EAG  Lab Results   Component Value Date    CHOL 93 2021     Lab Results   Component Value Date    TRIG 68 2021     Lab Results   Component Value Date    HDL 36 2021     Lab Results   Component Value Date    LDLCALC 43 2021     Lab Results   Component Value Date    LABVLDL 14 2021     No results found for: CHOLHDLRATIO  No results for input(s): PROBNP in the last 72 hours. Cardiac Tests:    EK: 1 atrial flutter 146 bpm    Telemetry: Atrial flutter with variable conduction 120s    Chest X-ray:   21    Impression   Right-sided chest port in good position.       No pneumothorax.       Moderate right-sided pleural effusions unchanged. Echocardiogram: pending    Stress test:      Cardiac catheterization:     ----------------------------------------------------------------------------------------------------------------------------------------------------------------  IMPRESSION:  1. New onset atrial flutter, onset 2021. Rate improved, on diltiazem infusion, IV digoxin loading. [id number] Score 0, but stroke risk underestimated due to presence of active malignancy  2. Acute hypoxic respiratory failure  3. Aspiration pneumonia  4. Pleural effusion  5. Recently diagnosed small cell lung cancer; metastasis to esophagus with distal esophageal compression  6. COPD  7. Long history of tobacco abuse  8. Status post open gastrostomy tube  9. Pancytopenia with recent drop in platelets 81  10. Severe malnutrition    RECOMMENDATIONS:  Patient's respiratory status is very tenuous and remains at risk for significant decompensation. He is drowning in his own secretions, ineffective clearance.      Continue efforts for rate control strategy at this time   Continue diltiazem infusion   Increase metoprolol tartrate to 25 twice daily   Last dose of IV digoxin loading this morning   Continue therapeutic enoxaparin as platelets remain greater than 50 K as per hematology recommendations   Not a candidate for HARPER cardioversion given esophageal obstruction and tenuous respiratory status   Per pulmonary, considering bronchoscopy   Surgery revising displaced G-tube today   Prognosis poor and guarded    Melissa Etienne MD, [address] [facility]    NOTE: This report was transcribed using voice recognition software. Every effort was made to ensure accuracy; however, inadvertent computerized transcription errors may be present.

## 2021-06-27 NOTE — PROGRESS NOTES
Hepatobiliary and Pancreatic Surgery Progress Note    Chirichella covering for Ezra Yunier    CC: s/p surgery    Subjective: Patient was confused overnight secondary to tking off his oxygen and pulled out his gastrostomy tube. A melgar catheter was inserted back into his abdomen by his nurse. Melgar was confirmed to be in place with a contrast study. OBJECTIVE      Physical    BP (!) 127/91   Pulse 158   Temp 98.7 °F (37.1 °C) (Oral)   Resp 20   Ht 5' 7\" (1.702 m)   Wt 110 lb (49.9 kg)   SpO2 90%   BMI 17.23 kg/m²       General appearance: appears in no acute distress  Lungs:respiratory effort normal without accessory numbers  Heart: no pedal edema  Abdomen: soft, nondistended, nontympanic, no guarding, no peritoneal signs, normoactive bowel sounds, melgar removed and a 22F gastrostomy tube was re-inserted  Extremities: ROM normal    ASSESSMENT: s/p open gastrostomy secondary for need for nutrition from lung cancer with patient removal of gastrostomy and placement of 22F replacement Gastrostomy tube    PLAN:    - check for placement  - tube secured with a binder  - okay to restart feedings on gastrograffin study confirms placement    Thank you for the consultation and allowing me to take part in Mr. Prather's care.       Felicia Delcid MD 6/27/2021 9:34 AM

## 2021-06-28 ENCOUNTER — APPOINTMENT (OUTPATIENT)
Dept: CT IMAGING | Age: 61
DRG: 951 | End: 2021-06-28
Payer: COMMERCIAL

## 2021-06-28 LAB
ANION GAP SERPL CALCULATED.3IONS-SCNC: 8 MMOL/L (ref 7–16)
ATYPICAL LYMPHOCYTE RELATIVE PERCENT: 1 % (ref 0–4)
BASOPHILS ABSOLUTE: 0 E9/L (ref 0–0.2)
BASOPHILS RELATIVE PERCENT: 0 % (ref 0–2)
BUN BLDV-MCNC: 14 MG/DL (ref 6–20)
CALCIUM SERPL-MCNC: 8.6 MG/DL (ref 8.6–10.2)
CHLORIDE BLD-SCNC: 97 MMOL/L (ref 98–107)
CO2: 30 MMOL/L (ref 22–29)
CREAT SERPL-MCNC: 0.3 MG/DL (ref 0.7–1.2)
EKG ATRIAL RATE: 187 BPM
EKG Q-T INTERVAL: 256 MS
EKG QRS DURATION: 86 MS
EKG QTC CALCULATION (BAZETT): 398 MS
EKG R AXIS: 122 DEGREES
EKG T AXIS: 139 DEGREES
EKG VENTRICULAR RATE: 146 BPM
EOSINOPHILS ABSOLUTE: 0 E9/L (ref 0.05–0.5)
EOSINOPHILS RELATIVE PERCENT: 0 % (ref 0–6)
GFR AFRICAN AMERICAN: >60
GFR NON-AFRICAN AMERICAN: >60 ML/MIN/1.73
GLUCOSE BLD-MCNC: 96 MG/DL (ref 74–99)
HCT VFR BLD CALC: 33.7 % (ref 37–54)
HEMOGLOBIN: 11.2 G/DL (ref 12.5–16.5)
LV EF: 63 %
LVEF MODALITY: NORMAL
LYMPHOCYTES ABSOLUTE: 0.42 E9/L (ref 1.5–4)
LYMPHOCYTES RELATIVE PERCENT: 29 % (ref 20–42)
MAGNESIUM: 2.1 MG/DL (ref 1.6–2.6)
MCH RBC QN AUTO: 28.7 PG (ref 26–35)
MCHC RBC AUTO-ENTMCNC: 33.2 % (ref 32–34.5)
MCV RBC AUTO: 86.4 FL (ref 80–99.9)
METER GLUCOSE: 108 MG/DL (ref 74–99)
MONOCYTES ABSOLUTE: 0.03 E9/L (ref 0.1–0.95)
MONOCYTES RELATIVE PERCENT: 2 % (ref 2–12)
NEUTROPHILS ABSOLUTE: 0.95 E9/L (ref 1.8–7.3)
NEUTROPHILS RELATIVE PERCENT: 68 % (ref 43–80)
PDW BLD-RTO: 14 FL (ref 11.5–15)
PHOSPHORUS: 2.5 MG/DL (ref 2.5–4.5)
PLATELET # BLD: 69 E9/L (ref 130–450)
PLATELET CONFIRMATION: NORMAL
PMV BLD AUTO: 9 FL (ref 7–12)
POTASSIUM SERPL-SCNC: 3.6 MMOL/L (ref 3.5–5)
RBC # BLD: 3.9 E12/L (ref 3.8–5.8)
RBC # BLD: NORMAL 10*6/UL
SODIUM BLD-SCNC: 135 MMOL/L (ref 132–146)
WBC # BLD: 1.4 E9/L (ref 4.5–11.5)

## 2021-06-28 PROCEDURE — 2700000000 HC OXYGEN THERAPY PER DAY

## 2021-06-28 PROCEDURE — 2580000003 HC RX 258: Performed by: STUDENT IN AN ORGANIZED HEALTH CARE EDUCATION/TRAINING PROGRAM

## 2021-06-28 PROCEDURE — 6360000002 HC RX W HCPCS: Performed by: STUDENT IN AN ORGANIZED HEALTH CARE EDUCATION/TRAINING PROGRAM

## 2021-06-28 PROCEDURE — 2060000000 HC ICU INTERMEDIATE R&B

## 2021-06-28 PROCEDURE — 2500000003 HC RX 250 WO HCPCS: Performed by: INTERNAL MEDICINE

## 2021-06-28 PROCEDURE — 84100 ASSAY OF PHOSPHORUS: CPT

## 2021-06-28 PROCEDURE — 74177 CT ABD & PELVIS W/CONTRAST: CPT

## 2021-06-28 PROCEDURE — 6360000002 HC RX W HCPCS

## 2021-06-28 PROCEDURE — 6360000002 HC RX W HCPCS: Performed by: INTERNAL MEDICINE

## 2021-06-28 PROCEDURE — 2500000003 HC RX 250 WO HCPCS: Performed by: STUDENT IN AN ORGANIZED HEALTH CARE EDUCATION/TRAINING PROGRAM

## 2021-06-28 PROCEDURE — 36415 COLL VENOUS BLD VENIPUNCTURE: CPT

## 2021-06-28 PROCEDURE — 83735 ASSAY OF MAGNESIUM: CPT

## 2021-06-28 PROCEDURE — 82962 GLUCOSE BLOOD TEST: CPT

## 2021-06-28 PROCEDURE — 93010 ELECTROCARDIOGRAM REPORT: CPT | Performed by: INTERNAL MEDICINE

## 2021-06-28 PROCEDURE — 99233 SBSQ HOSP IP/OBS HIGH 50: CPT | Performed by: INTERNAL MEDICINE

## 2021-06-28 PROCEDURE — 99233 SBSQ HOSP IP/OBS HIGH 50: CPT | Performed by: NURSE PRACTITIONER

## 2021-06-28 PROCEDURE — 6370000000 HC RX 637 (ALT 250 FOR IP): Performed by: STUDENT IN AN ORGANIZED HEALTH CARE EDUCATION/TRAINING PROGRAM

## 2021-06-28 PROCEDURE — 93306 TTE W/DOPPLER COMPLETE: CPT

## 2021-06-28 PROCEDURE — 80048 BASIC METABOLIC PNL TOTAL CA: CPT

## 2021-06-28 PROCEDURE — 6360000004 HC RX CONTRAST MEDICATION: Performed by: RADIOLOGY

## 2021-06-28 PROCEDURE — 85025 COMPLETE CBC W/AUTO DIFF WBC: CPT

## 2021-06-28 PROCEDURE — 2580000003 HC RX 258

## 2021-06-28 PROCEDURE — 99291 CRITICAL CARE FIRST HOUR: CPT | Performed by: INTERNAL MEDICINE

## 2021-06-28 PROCEDURE — 2580000003 HC RX 258: Performed by: RADIOLOGY

## 2021-06-28 RX ORDER — DIGOXIN 0.25 MG/ML
250 INJECTION INTRAMUSCULAR; INTRAVENOUS EVERY 6 HOURS
Status: DISCONTINUED | OUTPATIENT
Start: 2021-06-29 | End: 2021-06-29

## 2021-06-28 RX ORDER — METOPROLOL TARTRATE 5 MG/5ML
5 INJECTION INTRAVENOUS EVERY 6 HOURS
Status: CANCELLED | OUTPATIENT
Start: 2021-06-28 | End: 2021-06-29

## 2021-06-28 RX ORDER — SODIUM CHLORIDE 9 MG/ML
INJECTION, SOLUTION INTRAVENOUS ONCE
Status: CANCELLED | OUTPATIENT
Start: 2021-06-28

## 2021-06-28 RX ORDER — METOPROLOL TARTRATE 5 MG/5ML
2.5 INJECTION INTRAVENOUS EVERY 6 HOURS
Status: DISCONTINUED | OUTPATIENT
Start: 2021-06-28 | End: 2021-07-02 | Stop reason: HOSPADM

## 2021-06-28 RX ORDER — METOPROLOL TARTRATE 5 MG/5ML
2.5 INJECTION INTRAVENOUS EVERY 6 HOURS
Status: DISCONTINUED | OUTPATIENT
Start: 2021-06-28 | End: 2021-06-28

## 2021-06-28 RX ORDER — DIGOXIN 0.25 MG/ML
500 INJECTION INTRAMUSCULAR; INTRAVENOUS ONCE
Status: COMPLETED | OUTPATIENT
Start: 2021-06-28 | End: 2021-06-28

## 2021-06-28 RX ORDER — DIGOXIN 0.25 MG/ML
125 INJECTION INTRAMUSCULAR; INTRAVENOUS DAILY
Status: DISCONTINUED | OUTPATIENT
Start: 2021-06-28 | End: 2021-06-30

## 2021-06-28 RX ORDER — METOPROLOL TARTRATE 5 MG/5ML
5 INJECTION INTRAVENOUS EVERY 6 HOURS
Status: DISCONTINUED | OUTPATIENT
Start: 2021-06-28 | End: 2021-06-28 | Stop reason: DRUGHIGH

## 2021-06-28 RX ORDER — OLANZAPINE 5 MG/1
5 TABLET ORAL NIGHTLY
Status: DISCONTINUED | OUTPATIENT
Start: 2021-06-28 | End: 2021-07-02 | Stop reason: HOSPADM

## 2021-06-28 RX ORDER — SODIUM CHLORIDE 0.9 % (FLUSH) 0.9 %
10 SYRINGE (ML) INJECTION PRN
Status: COMPLETED | OUTPATIENT
Start: 2021-06-28 | End: 2021-06-28

## 2021-06-28 RX ADMIN — DOXYCYCLINE 100 MG: 100 INJECTION, POWDER, LYOPHILIZED, FOR SOLUTION INTRAVENOUS at 02:33

## 2021-06-28 RX ADMIN — METHYLPREDNISOLONE SODIUM SUCCINATE 40 MG: 40 INJECTION, POWDER, LYOPHILIZED, FOR SOLUTION INTRAMUSCULAR; INTRAVENOUS at 10:11

## 2021-06-28 RX ADMIN — SODIUM CHLORIDE, PRESERVATIVE FREE 10 ML: 5 INJECTION INTRAVENOUS at 21:05

## 2021-06-28 RX ADMIN — WATER 1000 MG: 1 INJECTION INTRAMUSCULAR; INTRAVENOUS; SUBCUTANEOUS at 10:10

## 2021-06-28 RX ADMIN — GUAIFENESIN 400 MG: 400 TABLET ORAL at 11:37

## 2021-06-28 RX ADMIN — DOXYCYCLINE 100 MG: 100 INJECTION, POWDER, LYOPHILIZED, FOR SOLUTION INTRAVENOUS at 15:00

## 2021-06-28 RX ADMIN — DIGOXIN 500 MCG: 250 INJECTION, SOLUTION INTRAMUSCULAR; INTRAVENOUS; PARENTERAL at 20:04

## 2021-06-28 RX ADMIN — FAMOTIDINE 20 MG: 20 TABLET ORAL at 11:37

## 2021-06-28 RX ADMIN — ENOXAPARIN SODIUM 50 MG: 60 INJECTION SUBCUTANEOUS at 10:11

## 2021-06-28 RX ADMIN — DIGOXIN 125 MCG: 250 INJECTION, SOLUTION INTRAMUSCULAR; INTRAVENOUS; PARENTERAL at 10:11

## 2021-06-28 RX ADMIN — DEXTROSE MONOHYDRATE 10 MG/HR: 50 INJECTION, SOLUTION INTRAVENOUS at 21:40

## 2021-06-28 RX ADMIN — METOPROLOL TARTRATE 2.5 MG: 5 INJECTION INTRAVENOUS at 13:59

## 2021-06-28 RX ADMIN — IOPAMIDOL 75 ML: 755 INJECTION, SOLUTION INTRAVENOUS at 09:49

## 2021-06-28 RX ADMIN — METOPROLOL TARTRATE 2.5 MG: 5 INJECTION INTRAVENOUS at 10:10

## 2021-06-28 RX ADMIN — Medication 10 ML: at 10:12

## 2021-06-28 RX ADMIN — METOPROLOL TARTRATE 2.5 MG: 5 INJECTION INTRAVENOUS at 20:12

## 2021-06-28 ASSESSMENT — PAIN SCALES - GENERAL
PAINLEVEL_OUTOF10: 0
PAINLEVEL_OUTOF10: 0

## 2021-06-28 NOTE — PROGRESS NOTES
INPATIENT CARDIOLOGY FOLLOW-UP    Name: Dave Flores    Age: 62 y.o. Date of Admission: 6/18/2021  6:54 AM    Date of Service: 6/28/2021    Primary Cardiologist: Known to me from this admission    Chief Complaint: Follow-up for new onset atrial flutter    Interim History:  Still difficulty with secretions but managing with intermittent suctioning pulmonary toilet. Diltiazem infusion discontinued last evening due to low blood pressure. Heart rate improved currently atrial flutter in the 110s. Still issues with the G-tube not functioning. Review of Systems:   Negative except as described above    Problem List:  Patient Active Problem List   Diagnosis    Acute non-recurrent maxillary sinusitis    [facility] cell lung [facility] ([facility] [address]    Acute on chronic respiratory failure with hypoxia (HCC)    Severe protein-calorie malnutrition (HCC)    Typical atrial flutter (HCC)    Pneumonia of left lower lobe due to infectious organism    Pleural effusion       Current Medications:    Current Facility-Administered Medications:     enoxaparin (LOVENOX) injection 50 mg, 1 mg/kg, Subcutaneous, BID, Marielle Pitt MD    digoxin (LANOXIN) injection 125 mcg, 125 mcg, Intravenous, Daily, Yadiel Espinosa MD    metoprolol (LOPRESSOR) injection 2.5 mg, 2.5 mg, Intravenous, Q6H, Yadiel Espinosa MD    dilTIAZem 100 mg in dextrose 5 % 100 mL infusion ([facility]), 5-15 mg/hr, Intravenous, Continuous, Carlos Perez DO, Stopped at 06/27/21 2126    [Held by provider] metoprolol tartrate (LOPRESSOR) tablet 25 mg, 25 mg, Oral, BID, Yadiel Espinosa MD    [facility]) nebulization 0.63 mg, 0.63 mg, Nebulization, Q4H PRN, Anthony Casey MD, 0.63 mg at 06/27/21 1606    perflutren lipid microspheres (DEFINITY) injection 1.65 mg, 1.5 mL, Intravenous, ONCE PRN, KAVYA Griffiths    HYDROmorphone (DILAUDID) injection 0.5 mg, 0.5 mg, Intravenous, Q3H PRN, 0.5 mg at 06/26/21 0403 **OR** HYDROmorphone (DILAUDID) injection 1 mg, 1 mg, Intravenous, Q3H PRN, Ranjan Johnson MD    naloxone [facility]) injection 0.4 mg, 0.4 mg, Intravenous, PRN, Samuel Rick MD    methylPREDNISolone sodium (SOLU-MEDROL) injection 40 mg, 40 mg, Intravenous, Daily, Ranjan Johnson MD, 40 mg at 06/27/21 1010    guaiFENesin tablet 400 mg, 400 mg, Oral, 4x Daily PRN, Ranjan Johnson MD, 400 mg at 06/22/21 2133    cefTRIAXone (ROCEPHIN) 1,000 mg in sterile water 10 mL IV syringe, 1,000 mg, Intravenous, Q24H, Ranjan Johnson MD, 1,000 mg at 06/27/21 1010    doxycycline (VIBRAMYCIN) 100 mg in dextrose 5 % 100 mL IVPB, 100 mg, Intravenous, Q12H, Ranjan Johnson MD, Stopped at 06/28/21 0411    sodium chloride flush 0.9 % injection 5-40 mL, 5-40 mL, Intravenous, 2 times per day, Ranjan Johnson MD, 10 mL at 06/27/21 2039    sodium chloride flush 0.9 % injection 5-40 mL, 5-40 mL, Intravenous, PRN, Ranjan Johnson MD, 10 mL at 06/21/21 1034    0.9 % sodium chloride infusion, 25 mL, Intravenous, PRN, Ranjan Johnson MD    ondansetron (ZOFRAN-ODT) disintegrating tablet 4 mg, 4 mg, Oral, Q8H PRN, 4 mg at 06/20/21 0859 **OR** ondansetron (ZOFRAN) injection 4 mg, 4 mg, Intravenous, Q6H PRN, Ranjan Johnson MD    polyethylene glycol [facility]) packet 17 g, 17 g, Oral, Daily PRN, Ranjan Johnson MD    acetaminophen (TYLENOL) tablet 650 mg, 650 mg, Oral, Q6H PRN **OR** acetaminophen (TYLENOL) suppository 650 mg, 650 mg, Rectal, Q6H PRN, Ranjan Johnson MD    famotidine (PEPCID) tablet 20 mg, 20 mg, Oral, BID, Ranjan Johnson MD, 20 mg at 06/26/21 2052    [Held by provider] nicotine (NICODERM CQ) 21 MG/24HR 1 patch, 1 patch, Transdermal, Daily, Ranjan Johnson MD, 1 patch at 06/25/21 7857    Physical Exam:  /85   Pulse 112   Temp 97.3 °F (36.3 °C) (Oral)   Resp 26   Ht 5' 7\" (1.702 m)   Wt 100 lb 1.6 oz (45.4 kg)   SpO2 94%   BMI 15.68 kg/m²   Wt Readings from Last 3 Encounters:   06/28/21 100 lb 1.6 oz (45.4 kg)   06/16/21 104 lb 11.2 oz (47.5 kg)   06/14/21 106 lb 3.2 oz (48.2 kg)     Appearance: Ill-appearing gentleman, cachectic. Gurgling in his own secretions, difficult to understand speech  Skin: Intact, no rash  Head: Normocephalic, atraumatic  Eyes: EOMI, no conjunctival erythema  ENMT: No pharyngeal erythema, MMM, no rhinorrhea  Neck: Supple, no elevated JVP, no carotid bruits  Lungs: Rhonchi everywhere  Cardiac: Unable to hear heart tones because the noisy breath sounds  Abdomen: Surgical incision and G tube covered  Extremities: Moves all extremities x 4, skinny legs, no lower extremity edema  Neurologic: No focal motor deficits apparent, normal mood and affect  Peripheral Pulses: Intact posterior tibial pulses bilaterally    Intake/Output:    Intake/Output Summary (Last 24 hours) at 6/28/2021 0910  Last data filed at 6/27/2021 2108  Gross per 24 hour   Intake 0 ml   Output 1325 ml   Net -1325 ml     No intake/output data recorded.     Laboratory Tests:  Recent Labs     06/26/21  0545 06/27/21  0245 06/28/21  0239    136 135   K 3.8 3.8 3.6    99 97*   CO2 27 28 30*   BUN 15 16 14   CREATININE 0.3* 0.3* 0.3*   GLUCOSE 147* 101* 96   CALCIUM 8.5* 8.6 8.6     Lab Results   Component Value Date    MG 2.1 06/28/2021     Recent Labs     06/25/21  1233   ALKPHOS 62   ALT 8   AST 17   PROT 6.5   BILITOT 0.5   LABALBU 2.9*     Recent Labs     06/26/21  0545 06/27/21  0245 06/28/21  0239   WBC 2.0* 1.8* 1.4*   RBC 3.64* 4.07 3.90   HGB 10.3* 11.7* 11.2*   HCT 32.9* 35.7* 33.7*   MCV 90.4 87.7 86.4   MCH 28.3 28.7 28.7   MCHC 31.3* 32.8 33.2   RDW 14.4 14.0 14.0   PLT 81* 80* 69*   MPV 8.7 8.5 9.0     Lab Results   Component Value Date    TROPONINI <0.01 05/07/2021     Lab Results   Component Value Date    INR 1.2 06/22/2021    INR 1.1 05/07/2021    PROTIME 13.5 (H) 06/22/2021    PROTIME 11.7 05/07/2021     Lab Results   Component Value Date    TSH 3.550 06/26/2021     Lab Results   Component Value Date    LABA1C 5.7 (H) 2021     No results found for: EAG  Lab Results   Component Value Date    CHOL 93 2021     Lab Results   Component Value Date    TRIG 68 2021     Lab Results   Component Value Date    HDL 36 2021     Lab Results   Component Value Date    LDLCALC 43 2021     Lab Results   Component Value Date    LABVLDL 14 2021     No results found for: CHOLHDLRATIO  No results for input(s): PROBNP in the last 72 hours. Cardiac Tests:    EK: 1 atrial flutter 146 bpm    Telemetry: Atrial flutter with variable conduction 110s    Chest X-ray:   21    Impression   Right-sided chest port in good position.       No pneumothorax.       Moderate right-sided pleural effusions unchanged. Echocardiogram: pending    Stress test:      Cardiac catheterization:     ----------------------------------------------------------------------------------------------------------------------------------------------------------------  IMPRESSION:  1. New onset atrial flutter, onset 2021. Rate improved, off diltiazem infusion, s/p IV digoxin loading. [id number] Score 0, but stroke risk underestimated due to presence of active malignancy  2. Acute hypoxic respiratory failure  3. Aspiration pneumonia  4. Pleural effusion  5. Recently diagnosed small cell lung cancer; metastasis to esophagus with distal esophageal compression  6. COPD  7. Long history of tobacco abuse  8. Status post open gastrostomy tube  9. Pancytopenia with recent drop in platelets 81 -> 69  10. Severe malnutrition    RECOMMENDATIONS:  Patient's respiratory status is very tenuous and remains at risk for significant decompensation.      Continue efforts for rate control strategy at this time   Since he still n.p.o. and with G-tube issues, convert metoprolol to 2.5 mg IV every 6 hours temporarily   Start digoxin 125 mcg IV daily   Continue therapeutic enoxaparin as platelets remain greater than 50 K as per hematology recommendations   Not a candidate for HARPER cardioversion given esophageal obstruction and tenuous respiratory status   Pulmonary note reviewed   Aggressive pulmonary toilet   G-tube issues per surgery   Prognosis remains poor and guarded    Erickson Reyes MD, [facility]    NOTE: This report was transcribed using voice recognition software. Every effort was made to ensure accuracy; however, inadvertent computerized transcription errors may be present.

## 2021-06-28 NOTE — CARE COORDINATION
Met with patient- confirmed that discharge plan remains home where he lives independently. Per general surgery okay to use G-tube. Await pt tolerating tube feeds. [facility] following- HHC orders needed, tube feed orders will be needed. [facility] DME following for potential home O2 needs. Pt denies the need for any assistive devices.

## 2021-06-28 NOTE — PROGRESS NOTES
CT reviewed - no extravasation of oral contrast.  G-tube present in stomach. Small amount of free air which is consistent with recent surgery. Ok to use G-tube.  Continue Binder    Electronically signed by Camille Opitz, DO on 6/28/2021 at 11:42 AM

## 2021-06-28 NOTE — PROGRESS NOTES
Pulmonary Progress Note for Jasmina    Admit Date: 2021  Hospital day                               PCP: Manju Chávez DO    Chief Complaint (s):  Patient Active Problem List   Diagnosis    Acute non-recurrent maxillary sinusitis    [facility] cell lung carcinoma ([facility] [address]    Acute on chronic respiratory failure with hypoxia ([facility] [address]    Severe protein-calorie malnutrition (HCC)    Typical atrial flutter (HCC)    Pneumonia of left lower lobe due to infectious organism    Pleural effusion       Subjective:  · Seen today, the patient continues to struggle with secretions. He is now amenable to a bronchoscopy for pulmonary toilet. Vitals:  VITALS:  /85   Pulse 112   Temp 97.3 °F (36.3 °C) (Oral)   Resp 26   Ht 5' 7\" (1.702 m)   Wt 100 lb 1.6 oz (45.4 kg)   SpO2 94%   BMI 15.68 kg/m²     24HR INTAKE/OUTPUT:      Intake/Output Summary (Last 24 hours) at 2021 1336  Last data filed at 2021 2108  Gross per 24 hour   Intake --   Output 575 ml   Net -575 ml       24HR PULSE OXIMETRY RANGE:    SpO2  Av %  Min: 91 %  Max: 96 %    Medications:  IV:   dilTIAZem Stopped (21)    sodium chloride         Scheduled Meds:   enoxaparin  1 mg/kg Subcutaneous BID    digoxin  125 mcg Intravenous Daily    metoprolol  2.5 mg Intravenous Q6H    [Held by provider] metoprolol tartrate  25 mg Oral BID    methylPREDNISolone  40 mg Intravenous Daily    cefTRIAXone (ROCEPHIN) IV  1,000 mg Intravenous Q24H    doxycycline (VIBRAMYCIN) IV  100 mg Intravenous Q12H    sodium chloride flush  5-40 mL Intravenous 2 times per day    famotidine  20 mg Oral BID    [Held by provider] nicotine  1 patch Transdermal Daily       Diet:   Diet NPO     EXAM:  General: No distress. Sleeping. Cachectic  Eyes: PERRL. No sclera icterus. No conjunctival injection. ENT: No discharge. Pharynx clear. Neck: Trachea midline. Normal thyroid. Resp: No accessory muscle use. LLL rales. No wheezing. previous. CT HEAD WO CONTRAST   Final Result   No acute intracranial abnormality. XR ABDOMEN FOR NG/OG/NE TUBE PLACEMENT   Final Result   Extravasation of contrast following Gastrografin injection suggesting   abnormal position of gastrostomy tube. XR ABDOMEN FOR NG/OG/NE TUBE PLACEMENT   Final Result   Tip of the gastric tube in proper position. Recent postoperative changes as above commented. CTA CHEST W CONTRAST   Final Result   No evidence of pulmonary embolism. Again noted are multiple areas of consolidation and scattered patchy   opacities, progressed since the previous exam and is concerning for   multifocal airspace disease process. Moderate size right pleural effusion with pleural metastatic implants are   again noted. Large right perihilar mass with extension to the mediastinum is again noted. Significant mediastinal lymphadenopathy including supraclavicular lymph nodes   is again noted suggestive of disease extension. CT ABDOMEN PELVIS WO CONTRAST Additional Contrast? None   Final Result   Extensive postoperative free air in the abdomen. Air also identified in the anterior abdominal wall subcutaneous tissues. Consolidation in the bilateral lung bases may be secondary to atelectasis   postoperative versus superimposed pneumonia in the proper clinical setting. Partially imaged right-sided pleural effusion. FL MODIFIED BARIUM SWALLOW W VIDEO   Final Result   Positive aspiration to thin and nectar consistencies of barium contrast were. Please see separate speech pathology report for full discussion of findings   and recommendations. US THORACENTESIS Which side should the procedure be performed? Right   Final Result   Successful ultrasound guided thoracentesis. XR CHEST PORTABLE   Final Result   Right-sided chest port in good position. No pneumothorax.       Moderate right-sided pleural effusions unchanged. FLUORO FOR SURGICAL PROCEDURES   Final Result   Intraprocedural fluoroscopic spot images as above. See separate procedure   report for more information. XR CHEST PORTABLE   Final Result   Stable abnormal chest with the mediastinal and hilar adenopathy with soft   tissue mass in the right hilum concerning for malignancy. Persistent bibasilar infiltrates and pleural effusion likely pneumonia or   edema. CTA CHEST W CONTRAST   Final Result   1. There is no evidence of a pulmonary embolus. 2. Moderate size right pleural effusion   3. Advanced emphysematous changes with superimposed pneumonia seen within the   left lower lobe and within the lingula. This finding is new when compared   with the previous CT scan of 06/11/2021 and PET-CT scan of 06/16/2021.   4. Large right perihilar mass with extension into the mediastinum consistent   with the history of metastatic small cell lung CA   5. Significant right hilar, mediastinal core right paratracheal and supra and   infraclavicular lymphadenopathy. 6. Pleural metastasis. Chronically distended esophagus secondary to the   metastatic lymphadenopathy within the inferior posterior mediastinum. XR CHEST PORTABLE   Final Result   No significant change             Assessment:  1. Large right pleural effusion: Likely malignant, cytology either is pending but was not sent. 2. Aspiration pneumonia, left side. It is unclear why a swallowing evaluation was done with the patient clearly has no esophageal lumen. 3. Esophageal occlusion: Secondary to likely malignant mediastinal adenopathy with external compression as seen above  4. Confusion: Worrisome for brain metastasis  5. Abnormal placed in G-tube: It was replaced after the patient pulled his original tube out despite it being well sutured in place per the operative note. Plan:  1. Continue aerosol treatments and vest treatments. 2. Bronchoscopy for pulmonary toilet. Unfortunately will likely only provide temporary relief  3. Replace the G-tube  4. Overall prognosis is grim. Time at the bedside, reviewing labs and radiographs, reviewing updated notes and consultations, discussing with staff and family was more than 45 minutes. Please note that voice recognition technology was used in the preparation of this note and make therefore it may contain inadvertent transcription errors. If the patient is a COVID 19 isolation patient, the above physical exam reflects that of the examining physician for the day. Severiano Smaller, MD,  MALFA., F.C.C.P.     [facility], [address] [place] [address], [address], [facility] [place]

## 2021-06-28 NOTE — PROGRESS NOTES
Palliative Care Department  Palliative Care Progress Note  Provider: NENA Maloney CNP  [phone]    Hospital Day: 11  Date of Initial Consult: 6/25/2021  Referring Provider: Dr. Vilma Perez was consulted for assistance with: Code status Discussion and Assist with goals of care    Chief Complaint: Joaquin Mccray is a 62 y.o. male with chief complaint of SOB    HPI:   Joaquin Mccray is a 62 y.o. male with significant medical history of recently diagnosed extensive small cell lung CA, COPD, continued tobacco use (40 PPY), known to Dr. Cristiano Arana, s/p initiation of chemotherapy with Cycle # 1 Carboplatin + Etoposide + Tecentriq on 06/16/2021, who was admitted on 6/18/2021 with worsening SOB and has received treatment for PNA, pleural effusion, and is s/p open gastrostomy placement due to esophageal compression. He had RRT following gastrostomy placement for tachycardia, hypotension, and hypoxia and was transferred to the ICU. Palliative has been asked to discuss goals of care and code status. ASSESSMENT/PLAN:     Pertinent Hospital Diagnoses:  Current medical issues leading to Palliative Medicine involvement include   Active Hospital Problems    Diagnosis Date Noted    Typical atrial flutter (HCC) [I48.3]     Pneumonia of left lower lobe due to infectious organism [J18.9]     Pleural effusion [J90]     Severe protein-calorie malnutrition ([address] [E43] 06/25/2021    Acute on chronic respiratory failure with hypoxia ([facility] [address] [J96.21] 06/18/2021    Small cell lung carcinoma ([facility] [address] [C34.90] 06/14/2021     Palliative Care Encounter / Counseling Regarding Goals of Care:  Please see detailed goals of care discussion as below.  At this time, Joaquin Mccray, Does have capacity for medical decision-making. Capacity is time limited and situation/question specific.    The patient's daughter Yudi Farrell would be surrogate medical decision-maker if needed   Outcome of goals of care meeting: live minutes in counseling and coordination of care at the bedside regarding goals of care and see above. NENA Prakash CNP  Palliative Medicine    Patient and the plan of care discussed with the other IDT members of Palliative Care Team, and with consultants and patient, as appropriate and available. Thank you for allowing Palliative Medicine to participate in the care of Oleksandr Aguilar. Note: This report was completed using computerDoubleUp voiced recognition software. Every effort has been made to ensure accuracy; however, inadvertent computerized transcription errors may be present.

## 2021-06-28 NOTE — PROGRESS NOTES
Inpatient Medical Oncology Progress Note    Subjective:  No fever. SOB. Bronch in am    Objective:  /72   Pulse 86   Temp 97.6 °F (36.4 °C) (Oral)   Resp 26   Ht 5' 7\" (1.702 m)   Wt 100 lb 1.6 oz (45.4 kg)   SpO2 92%   BMI 15.68 kg/m²   GENERAL: Alert, oriented x 3, tired  HEENT: PERRLA; EOMI. Oropharynx clear. NECK: Supple. Without lymphadenopathy. LUNGS: scattered wheezing tangela  CARDIOVASCULAR: Regular rate. No murmurs, rubs or gallops. ABDOMEN: Soft. Non-tender, non-distended. PEG tube  EXTREMITIES: Without clubbing, cyanosis, or edema. NEUROLOGIC: No focal deficits.    ECOG PS 2    Diagnostics:  Lab Results   Component Value Date    WBC 1.4 (L) 06/28/2021    HGB 11.2 (L) 06/28/2021    HCT 33.7 (L) 06/28/2021    MCV 86.4 06/28/2021    PLT 69 (L) 06/28/2021     Lab Results   Component Value Date     06/28/2021    K 3.6 06/28/2021    CL 97 (L) 06/28/2021    CO2 30 (H) 06/28/2021    BUN 14 06/28/2021    CREATININE 0.3 (L) 06/28/2021    GLUCOSE 96 06/28/2021    CALCIUM 8.6 06/28/2021    PROT 6.5 06/25/2021    LABALBU 2.9 (L) 06/25/2021    BILITOT 0.5 06/25/2021    ALKPHOS 62 06/25/2021    AST 17 06/25/2021    ALT 8 06/25/2021    LABGLOM >60 06/28/2021    GFRAA >60 06/28/2021     Impression/Plan:  62 y.o male with Extensive SCLC     PET/CT on 06/15/2021 Mass centered in the right hilar region extending into the mediastinum consistent with history of small cell lung cancer with metabolically active metastatic mediastinal lymphadenopathy extending into the bilateral supraclavicular region and inferiorly into the retrocrural region.   Metabolically active right pleural metastases with corresponding pleural effusion  Metabolically active nodules in the skin of the abdomen are concerning for metastatic disease.   The esophagus is distended which appears to be secondary to compression by lower mediastinal lymphadenopathy.   Intense activity in the collapsed right lower lobe could be due to additional malignancy or postobstructive pneumonia.      Extensive SCLC, Recommended systemic therapy consisting of Carboplatin + Etoposide + Tecentriq. Side effects reviewed. He agreed to proceed.   Cycle # 1 Carboplatin + Etoposide + Tecentriq was on 06/16/2021  He had presented to the hospital on 06/18/2021 for worsening shortness of breath. CTA chest noted no PE. Moderate right pleural effusion  Advanced emphysematous changes with superimposed pneumonia seen within the with the previous CT scan of 06/11/2021 and PET-CT scan of 06/16/2021  Large right perihilar mass with extension into the mediastinum  Significant right hilar mediastinal core right paratracheal and supra and infraclavicular LN  Pleural metastasis. Chronically distended esophagus   The patient was admitted with acute COPD exacerbation, possible postobstructive pneumonia  The patient is doing better clinically. Pulmonary team on board  Bronchoscopy for pulmonary diet  IR guided right thoracentesis 06/22/2021; A total of 1200 cc of pleural effusion was removed  Continue IV steroids, nebs. Continue antibiotics  Continue to monitor his CBCD. Right subclavian Mediport 06/22/2021  Speech on board, failed swallow study. EGD was attempted 6/24 for PEG placement but extrinsic compression on the esophagus prevented the scope advancement. Open gastrostomy tube 06/25/2021. General surgery following. Ok to use G tube  Will continue to follow.     06/28/2021  Juvencio Salazar MD

## 2021-06-28 NOTE — PROGRESS NOTES
[facility] Progress Note    Admitting Date and Time: 6/18/2021  6:54 AM  Admit Dx: Acute on chronic respiratory failure with hypoxia (HCC) [J96.21]    Subjective:  Patient is being followed for Acute on chronic respiratory failure with hypoxia ([address] [J96.21]   Pt feels ok, still with shortness of breath and crackly voice    ROS: denies fever, chills, cp, n/v, HA unless stated above.      enoxaparin  1 mg/kg Subcutaneous Daily    metoprolol tartrate  25 mg Oral BID    methylPREDNISolone  40 mg Intravenous Daily    cefTRIAXone (ROCEPHIN) IV  1,000 mg Intravenous Q24H    doxycycline (VIBRAMYCIN) IV  100 mg Intravenous Q12H    sodium chloride flush  5-40 mL Intravenous 2 times per day    famotidine  20 mg Oral BID    [Held by provider] nicotine  1 patch Transdermal Daily     levalbuterol, 0.63 mg, Q4H PRN  perflutren lipid microspheres, 1.5 mL, ONCE PRN  HYDROmorphone, 0.5 mg, Q3H PRN   Or  HYDROmorphone, 1 mg, Q3H PRN  naloxone, 0.4 mg, PRN  guaiFENesin, 400 mg, 4x Daily PRN  sodium chloride flush, 5-40 mL, PRN  sodium chloride, 25 mL, PRN  ondansetron, 4 mg, Q8H PRN   Or  ondansetron, 4 mg, Q6H PRN  polyethylene glycol, 17 g, Daily PRN  acetaminophen, 650 mg, Q6H PRN   Or  acetaminophen, 650 mg, Q6H PRN         Objective:    /85   Pulse 112   Temp 97.3 °F (36.3 °C) (Oral)   Resp 26   Ht 5' 7\" (1.702 m)   Wt 100 lb 1.6 oz (45.4 kg)   SpO2 94%   BMI 15.68 kg/m²     General Appearance: alert and oriented to person, place and time and in no acute distress  Skin: warm and dry  Head: normocephalic and atraumatic  Eyes: pupils equal, round, and reactive to light, extraocular eye movements intact, conjunctivae normal  Neck: neck supple and non tender without mass   Pulmonary/Chest: crackles bilaterally- no wheezes, normal air movement, no respiratory distress  Cardiovascular: normal rate, normal S1 and S2 and no carotid bruits  Abdomen: soft, non-tender, non-distended, normal bowel sounds, no masses or organomegaly  Extremities: no cyanosis, no clubbing and no edema  Neurologic: no cranial nerve deficit and speech normal        Recent Labs     06/26/21  0545 06/27/21  0245 06/28/21  0239    136 135   K 3.8 3.8 3.6    99 97*   CO2 27 28 30*   BUN 15 16 14   CREATININE 0.3* 0.3* 0.3*   GLUCOSE 147* 101* 96   CALCIUM 8.5* 8.6 8.6       Recent Labs     06/26/21  0545 06/27/21  0245 06/28/21  0239   WBC 2.0* 1.8* 1.4*   RBC 3.64* 4.07 3.90   HGB 10.3* 11.7* 11.2*   HCT 32.9* 35.7* 33.7*   MCV 90.4 87.7 86.4   MCH 28.3 28.7 28.7   MCHC 31.3* 32.8 33.2   RDW 14.4 14.0 14.0   PLT 81* 80* 69*   MPV 8.7 8.5 9.0       Assessment:    Active Problems:    Small cell lung carcinoma (HCC)    Acute on chronic respiratory failure with hypoxia (HCC)    Severe protein-calorie malnutrition (HCC)    Typical atrial flutter (HCC)    Pneumonia of left lower lobe due to infectious organism    Pleural effusion  Resolved Problems:    * No resolved hospital problems. *      Plan:  1. Acute on chronic hypoxic respiratory failure in association with emphysema and lung cancer as well as right pleural effusion that is presumed to be malignant, not sure if fluid cytology was sent after thoracentesis. O2 sat was at 86%, currently requiring 6 L of oxygen obtain oxygen saturation above 90%. CTA of the chest was negative for PE  2. Dysphagia none due to to compress esophagus secondary to lung cancer, unable to place NG tube, status post G-tube placement with open approach. 3.  Possible aspiration pneumonia, patient was started on doxycycline and Rocephin. 4.  Hypotension, started after the patient received his open G-tube placement, initially thought to be due to sedating medication from the OR as the patient received ketamine, fentanyl, Dilaudid, etomidate, Versed and bupivacaine.   Status post Narcan which did not help, received several boluses of IV fluids, question septic picture, patient was transferred to the ICU where he remains for 2 nights and transferred out of the ICU. CT of the abdomen showed postsurgical changes. 5.  Acute exacerbation of COPD, continue steroids and antibiotics as well as bronchodilators. 6.  New onset A. fib with RVR, normal TSH, started on metoprolol 25 mg twice daily, digoxin and diltiazem, cannot do HARPER cardioversion due to obstructive esophagus, [id number] score is 0 but with increased risk of clotting due to cancer the patient was started on Lovenox full dose 50 mg twice daily. 7.  Goals of care, patient is full code, palliative care was involved and patient apparently has poor insight yet he is the decision-maker and he continues to be full code, patient has very poor prognosis, he reported that he wouldn't want to be resuscitated but he needed to talk to family first.       NOTE: This report was transcribed using voice recognition software. Every effort was made to ensure accuracy; however, inadvertent computerized transcription errors may be present.   Electronically signed by Diaz Monique MD on 6/28/2021 at 8:33 AM

## 2021-06-28 NOTE — PROGRESS NOTES
GENERAL SURGERY  DAILY PROGRESS NOTE  6/28/2021  Cc: dysphagia    Subjective:  Gtube accidentally pulled out over weekend. As this was stammed, replaced at bedside. XR Gtube study with contrast showed some possible extrav of contrast.    Patient without abdominal pain. Objective:  /85   Pulse 112   Temp 97.3 °F (36.3 °C) (Oral)   Resp 26   Ht 5' 7\" (1.702 m)   Wt 100 lb 1.6 oz (45.4 kg)   SpO2 94%   BMI 15.68 kg/m²     GENERAL:  Sitting in chair, awake, alert, cooperative, no apparent distress  HEAD: Normocephalic, atraumatic  EYES: No sclera icterus, pupils equal  LUNGS:  On supplemental O2  CARDIOVASCULAR:  Regular rate  ABDOMEN:  Soft, non-tender, non-distended. No peritoneal signs.   22F gtube in place with abdominal binder  EXTREMITIES: No edema or swelling  SKIN: Warm and dry, no rashes or lesions    Assessment/Plan:  62 y.o. male s/p open gastrostomy secondary for need for nutrition from lung cancer with patient removal of gastrostomy and placement of 22F replacement Gastrostomy tube    Will obtain CT with IV contrast to assess location of G tube given XR findings      Electronically signed by Tanvir Bourne DO on 6/28/2021 at 11:38 AM

## 2021-06-29 ENCOUNTER — APPOINTMENT (OUTPATIENT)
Dept: GENERAL RADIOLOGY | Age: 61
DRG: 951 | End: 2021-06-29
Payer: COMMERCIAL

## 2021-06-29 LAB
ANION GAP SERPL CALCULATED.3IONS-SCNC: 11 MMOL/L (ref 7–16)
ATYPICAL LYMPHOCYTE RELATIVE PERCENT: 1.8 % (ref 0–4)
BASOPHILS ABSOLUTE: 0 E9/L (ref 0–0.2)
BASOPHILS RELATIVE PERCENT: 0 % (ref 0–2)
BUN BLDV-MCNC: 23 MG/DL (ref 6–20)
CALCIUM SERPL-MCNC: 8.9 MG/DL (ref 8.6–10.2)
CHLORIDE BLD-SCNC: 99 MMOL/L (ref 98–107)
CO2: 29 MMOL/L (ref 22–29)
CREAT SERPL-MCNC: 0.6 MG/DL (ref 0.7–1.2)
EKG ATRIAL RATE: 87 BPM
EKG P AXIS: 73 DEGREES
EKG P-R INTERVAL: 134 MS
EKG Q-T INTERVAL: 378 MS
EKG QRS DURATION: 96 MS
EKG QTC CALCULATION (BAZETT): 454 MS
EKG R AXIS: 91 DEGREES
EKG T AXIS: 70 DEGREES
EKG VENTRICULAR RATE: 87 BPM
EOSINOPHILS ABSOLUTE: 0 E9/L (ref 0.05–0.5)
EOSINOPHILS RELATIVE PERCENT: 0 % (ref 0–6)
GFR AFRICAN AMERICAN: >60
GFR NON-AFRICAN AMERICAN: >60 ML/MIN/1.73
GLUCOSE BLD-MCNC: 100 MG/DL (ref 74–99)
HCT VFR BLD CALC: 37.3 % (ref 37–54)
HEMOGLOBIN: 12 G/DL (ref 12.5–16.5)
LYMPHOCYTES ABSOLUTE: 0.3 E9/L (ref 1.5–4)
LYMPHOCYTES RELATIVE PERCENT: 25.4 % (ref 20–42)
MAGNESIUM: 2.3 MG/DL (ref 1.6–2.6)
MCH RBC QN AUTO: 28.5 PG (ref 26–35)
MCHC RBC AUTO-ENTMCNC: 32.2 % (ref 32–34.5)
MCV RBC AUTO: 88.6 FL (ref 80–99.9)
METAMYELOCYTES RELATIVE PERCENT: 7.9 % (ref 0–1)
MONOCYTES ABSOLUTE: 0.04 E9/L (ref 0.1–0.95)
MONOCYTES RELATIVE PERCENT: 3.5 % (ref 2–12)
MYELOCYTE PERCENT: 3.5 % (ref 0–0)
NEUTROPHILS ABSOLUTE: 0.76 E9/L (ref 1.8–7.3)
NEUTROPHILS RELATIVE PERCENT: 57.9 % (ref 43–80)
NUCLEATED RED BLOOD CELLS: 0 /100 WBC
PDW BLD-RTO: 14.1 FL (ref 11.5–15)
PHOSPHORUS: 2.8 MG/DL (ref 2.5–4.5)
PLATELET # BLD: 76 E9/L (ref 130–450)
PLATELET CONFIRMATION: NORMAL
PMV BLD AUTO: 9 FL (ref 7–12)
POLYCHROMASIA: ABNORMAL
POTASSIUM SERPL-SCNC: 4.1 MMOL/L (ref 3.5–5)
RBC # BLD: 4.21 E12/L (ref 3.8–5.8)
SODIUM BLD-SCNC: 139 MMOL/L (ref 132–146)
WBC # BLD: 1.1 E9/L (ref 4.5–11.5)

## 2021-06-29 PROCEDURE — 93010 ELECTROCARDIOGRAM REPORT: CPT | Performed by: INTERNAL MEDICINE

## 2021-06-29 PROCEDURE — 2500000003 HC RX 250 WO HCPCS: Performed by: INTERNAL MEDICINE

## 2021-06-29 PROCEDURE — 71045 X-RAY EXAM CHEST 1 VIEW: CPT

## 2021-06-29 PROCEDURE — 6370000000 HC RX 637 (ALT 250 FOR IP): Performed by: STUDENT IN AN ORGANIZED HEALTH CARE EDUCATION/TRAINING PROGRAM

## 2021-06-29 PROCEDURE — 99233 SBSQ HOSP IP/OBS HIGH 50: CPT | Performed by: INTERNAL MEDICINE

## 2021-06-29 PROCEDURE — 84100 ASSAY OF PHOSPHORUS: CPT

## 2021-06-29 PROCEDURE — 83735 ASSAY OF MAGNESIUM: CPT

## 2021-06-29 PROCEDURE — 6360000002 HC RX W HCPCS: Performed by: INTERNAL MEDICINE

## 2021-06-29 PROCEDURE — 2060000000 HC ICU INTERMEDIATE R&B

## 2021-06-29 PROCEDURE — 2500000003 HC RX 250 WO HCPCS: Performed by: STUDENT IN AN ORGANIZED HEALTH CARE EDUCATION/TRAINING PROGRAM

## 2021-06-29 PROCEDURE — 2580000003 HC RX 258

## 2021-06-29 PROCEDURE — 80048 BASIC METABOLIC PNL TOTAL CA: CPT

## 2021-06-29 PROCEDURE — 93005 ELECTROCARDIOGRAM TRACING: CPT | Performed by: INTERNAL MEDICINE

## 2021-06-29 PROCEDURE — 85025 COMPLETE CBC W/AUTO DIFF WBC: CPT

## 2021-06-29 PROCEDURE — 92526 ORAL FUNCTION THERAPY: CPT

## 2021-06-29 PROCEDURE — 97165 OT EVAL LOW COMPLEX 30 MIN: CPT

## 2021-06-29 PROCEDURE — 6360000002 HC RX W HCPCS: Performed by: STUDENT IN AN ORGANIZED HEALTH CARE EDUCATION/TRAINING PROGRAM

## 2021-06-29 PROCEDURE — 36415 COLL VENOUS BLD VENIPUNCTURE: CPT

## 2021-06-29 PROCEDURE — 51798 US URINE CAPACITY MEASURE: CPT

## 2021-06-29 PROCEDURE — 51702 INSERT TEMP BLADDER CATH: CPT

## 2021-06-29 PROCEDURE — 2580000003 HC RX 258: Performed by: STUDENT IN AN ORGANIZED HEALTH CARE EDUCATION/TRAINING PROGRAM

## 2021-06-29 PROCEDURE — 2700000000 HC OXYGEN THERAPY PER DAY

## 2021-06-29 PROCEDURE — 6370000000 HC RX 637 (ALT 250 FOR IP): Performed by: INTERNAL MEDICINE

## 2021-06-29 PROCEDURE — 97161 PT EVAL LOW COMPLEX 20 MIN: CPT

## 2021-06-29 RX ORDER — ZIPRASIDONE MESYLATE 20 MG/ML
20 INJECTION, POWDER, LYOPHILIZED, FOR SOLUTION INTRAMUSCULAR ONCE
Status: COMPLETED | OUTPATIENT
Start: 2021-06-29 | End: 2021-06-29

## 2021-06-29 RX ORDER — DILTIAZEM HYDROCHLORIDE 5 MG/ML
15 INJECTION INTRAVENOUS ONCE
Status: COMPLETED | OUTPATIENT
Start: 2021-06-29 | End: 2021-06-29

## 2021-06-29 RX ORDER — DILTIAZEM HYDROCHLORIDE 5 MG/ML
20 INJECTION INTRAVENOUS PRN
Status: DISCONTINUED | OUTPATIENT
Start: 2021-06-29 | End: 2021-07-02 | Stop reason: HOSPADM

## 2021-06-29 RX ORDER — OLANZAPINE 10 MG/1
10 INJECTION, POWDER, LYOPHILIZED, FOR SOLUTION INTRAMUSCULAR ONCE
Status: COMPLETED | OUTPATIENT
Start: 2021-06-29 | End: 2021-06-29

## 2021-06-29 RX ADMIN — DIGOXIN 125 MCG: 250 INJECTION, SOLUTION INTRAMUSCULAR; INTRAVENOUS; PARENTERAL at 11:08

## 2021-06-29 RX ADMIN — DOXYCYCLINE 100 MG: 100 INJECTION, POWDER, LYOPHILIZED, FOR SOLUTION INTRAVENOUS at 15:32

## 2021-06-29 RX ADMIN — OLANZAPINE 10 MG: 10 INJECTION, POWDER, FOR SOLUTION INTRAMUSCULAR at 03:39

## 2021-06-29 RX ADMIN — OLANZAPINE 5 MG: 5 TABLET, FILM COATED ORAL at 00:42

## 2021-06-29 RX ADMIN — ENOXAPARIN SODIUM 50 MG: 60 INJECTION SUBCUTANEOUS at 22:01

## 2021-06-29 RX ADMIN — METOPROLOL TARTRATE 2.5 MG: 5 INJECTION INTRAVENOUS at 01:50

## 2021-06-29 RX ADMIN — TBO-FILGRASTIM 300 MCG: 300 INJECTION, SOLUTION SUBCUTANEOUS at 17:26

## 2021-06-29 RX ADMIN — OLANZAPINE 5 MG: 5 TABLET, FILM COATED ORAL at 22:01

## 2021-06-29 RX ADMIN — WATER 1000 MG: 1 INJECTION INTRAMUSCULAR; INTRAVENOUS; SUBCUTANEOUS at 09:36

## 2021-06-29 RX ADMIN — METHYLPREDNISOLONE SODIUM SUCCINATE 40 MG: 40 INJECTION, POWDER, LYOPHILIZED, FOR SOLUTION INTRAMUSCULAR; INTRAVENOUS at 09:37

## 2021-06-29 RX ADMIN — METOPROLOL TARTRATE 2.5 MG: 5 INJECTION INTRAVENOUS at 15:06

## 2021-06-29 RX ADMIN — HYDROMORPHONE HYDROCHLORIDE 1 MG: 1 INJECTION, SOLUTION INTRAMUSCULAR; INTRAVENOUS; SUBCUTANEOUS at 22:45

## 2021-06-29 RX ADMIN — Medication 10 ML: at 22:02

## 2021-06-29 RX ADMIN — FAMOTIDINE 20 MG: 20 TABLET ORAL at 09:41

## 2021-06-29 RX ADMIN — DILTIAZEM HYDROCHLORIDE 15 MG: 5 INJECTION INTRAVENOUS at 19:00

## 2021-06-29 RX ADMIN — WATER 1.2 ML: 1 INJECTION INTRAMUSCULAR; INTRAVENOUS; SUBCUTANEOUS at 18:19

## 2021-06-29 RX ADMIN — FAMOTIDINE 20 MG: 20 TABLET ORAL at 22:01

## 2021-06-29 RX ADMIN — DOXYCYCLINE 100 MG: 100 INJECTION, POWDER, LYOPHILIZED, FOR SOLUTION INTRAVENOUS at 03:00

## 2021-06-29 RX ADMIN — SODIUM CHLORIDE, PRESERVATIVE FREE 10 ML: 5 INJECTION INTRAVENOUS at 11:08

## 2021-06-29 RX ADMIN — ZIPRASIDONE MESYLATE 20 MG: 20 INJECTION, POWDER, LYOPHILIZED, FOR SOLUTION INTRAMUSCULAR at 18:04

## 2021-06-29 RX ADMIN — DEXTROSE MONOHYDRATE 5 MG/HR: 50 INJECTION, SOLUTION INTRAVENOUS at 05:13

## 2021-06-29 RX ADMIN — SODIUM CHLORIDE, PRESERVATIVE FREE 10 ML: 5 INJECTION INTRAVENOUS at 15:07

## 2021-06-29 ASSESSMENT — PAIN SCALES - GENERAL: PAINLEVEL_OUTOF10: 7

## 2021-06-29 NOTE — PLAN OF CARE
Problem: Malnutrition  (NI-5.2)  Goal: Food and/or Nutrient Delivery  Description: Individualized approach for food/nutrient provision.   Outcome: Ongoing 97.8

## 2021-06-29 NOTE — FLOWSHEET NOTE
[facility] Catheter Team Documentation      Date: 6/29/2021  Time: 4:08 PM  Nursing Unit: [address] INTERNAL MEDICINE 2  Room Number: 7787/4959-Q  Patient Name: Mahendra Fernandez  Sex: male  MRN: [id number]  Nursing Unit Contact Nurse: Derek Ibarra        Reason for Call: need straight cath    Arrival Time to Nursing Unit: 1550    Catheter Size/Type: 16 Coude    # of Attempts Prior to Catheter Team Called: 2    # of Attempts by Catheter Team: 3    Procedure Start Time 1036    Procedure Performed  yes    Procedure End Time 1605    Successful Insertion of Indwelling Urinary Catheter: Yes    Urine Culture for C&S Collected and Sent: No    Reason for Difficult Catheterization: Male-attempt to straight cath with 12Fr then 14 Fr catheter. Unsuccessful, had small amount of bleeding from urethra upon attempts. Did successfully place 16 FR indwelling Coude catheter with moderate difficulty, upon return of urine, did get back one small clot.  Irrigated with 50mL saline and returned without difficulty, clear june colored urine now returning    Urologist on Case: No    Urologist Notified: No    PCP Notified: Yes (Floor RN to notify)    Urologist Consulted: No    Urologist Notified: No    LDA Documented: Yes    Prior Urological History and Reason for Difficult Insertion: n/a    Successful: Yes    Residual Urine Amount: 630 cc    Urine Color: Clear june    Clots: Yes (one clot)    Urologist on Case: n/a     Urologist Notified: No    Event End Time: 602 [address]        Electronically signed by Ede Cat RN on 6/29/2021 at 4:08 PM

## 2021-06-29 NOTE — PROGRESS NOTES
Physical Therapy    Facility/Department: [address] INTERNAL MEDICINE 2  Initial Assessment    NAME: Eladio Casey  : 1960  MRN: [id number]    Date of Service: 2021    Patient Diagnosis(es): The primary encounter diagnosis was Acute on chronic respiratory failure with hypoxia ([facility] [address] Diagnoses of Pneumonia of left lower lobe due to infectious organism and Pleural effusion were also pertinent to this visit. has a past medical history of Alcohol abuse, Cancer ([facility] [address] [facility] Lung mass, Seasonal allergies, and Tobacco abuse.   has a past surgical history that includes hernia repair; Tonsillectomy; thoracentesis (Right, 2021); bronchoscopy (N/A, 6/3/2021); bronchoscopy (N/A, 6/3/2021);  [address] Surgery (N/A, 2021); Upper gastrointestinal endoscopy (N/A, 2021); and gastrostomy (N/A, 2021). Referring provider:  Frannie Rodriguez MD    PT Order:  PT eval and treat     Evaluating PT:  Cassy Valdez PT, DPT PT [id number]    Room #:  4409/2370-N  Diagnosis:  Acute on chronic respiratory failure with hypoxia [facility]) [J96.21]  Procedure/Surgery: Open gastrostomy tube placement  (21)  Precautions:  Fall risk, high flow O2  Equipment Needs:  TBD    SUBJECTIVE:    Pt lives alone in a 2 story home with 3 stairs to enter and 1 rail. Bed and bath is on second floor. Pt ambulated with no device PTA. Unsure since pt's speech difficult to understand at times. OBJECTIVE:   Initial Evaluation  Date:  Treatment Short Term/ Long Term   Goals   Was pt agreeable to Eval/treatment? yes     Does pt have pain?  None reported     Bed Mobility  Rolling: NT  Supine to sit: Min A  Sit to supine: SBA  Scooting: Supervision along the side of the bed  independent   Transfers Sit to stand: NT  Stand to sit: NT  Stand pivot: NT  SBA   Ambulation    NT  50+ feet with AAD if needed SBA    Stair negotiation: ascended and descended  NT      ROM BLE:  WFL     Strength BLE:  Grossly 4-/5  Grossly 4/5   Balance Sitting EOB: SBA  Dynamic Standing:  NT  Sitting EOB:  independent  Dynamic Standing:  SBA with AAD if needed   AM-PAC 6 Clicks 32/23       Orientation:  Impaired. Pt's speech difficult to understand. Sensation:  Pt denies numbness and tingling to extremities      Vitals:  SPO2 lowest 91% on 7L while sitting EOB. Patient education  Pt educated on PT objectives during eval and while in the hospital, safety during mobility. Patient response to education:   Pt verbalized understanding Pt demonstrated skill Pt requires further education in this area   yes With cueing yes     ASSESSMENT:    Conditions Requiring Skilled Therapeutic Intervention:    [x]Decreased strength     []Decreased ROM  [x]Decreased functional mobility  [x]Decreased balance   [x]Decreased endurance   []Decreased posture  []Decreased sensation  []Decreased coordination   []Decreased vision  [x]Decreased safety awareness   []Increased pain       Comments:  Pt found in bed. Nursing okayed pt to sit EOB only during evaluation. No report of dizziness during functional mobility. Pt's/ family goals   1. None stated    Prognosis is good for reaching above PT goals. Patient and or family understand(s) diagnosis, prognosis, and plan of care.   yes    PHYSICAL THERAPY PLAN OF CARE:    PT POC is established based on physician order and patient diagnosis     Diagnosis:  Acute on chronic respiratory failure with hypoxia ([facility] [address] [J96.21]    Current Treatment Recommendations:     [x] Strengthening to improve independence with functional mobility   [] ROM to improve independence with functional mobility   [x] Balance Training to improve static/dynamic balance and to reduce fall risk  [x] Endurance Training to improve activity tolerance during functional mobility   [x] Transfer Training to improve safety and independence with all functional transfers   [x] Gait Training to improve gait mechanics, endurance and assess need for appropriate assistive device  [] Stair Training in preparation for safe discharge home and/or into the community   [x] Positioning to prevent skin breakdown and contractures  [x] Safety and Education Training   [x] Patient/Caregiver Education   [] HEP  [] Other     PT long term treatment goals are located in above grid    Frequency of treatments: 2-5x/week x 1-2 weeks. Time in  1230  Time out  1240    Evaluation Time includes thorough review of current medical information, gathering information on past medical history/social history and prior level of function, completion of standardized testing/informal observation of tasks, assessment of data and education on plan of care and goals.     CPT codes:  [x] Low Complexity PT evaluation 31365  [] Moderate Complexity PT evaluation 67020  [] High Complexity PT evaluation 65191  [] PT Re-evaluation 31512  [] Therapeutic activities 41229 __minutes  [] Therapeutic exercises 79963 __ minutes      Radha Hastings, PT, DPT  PT [id number]

## 2021-06-29 NOTE — CARE COORDINATION
Chart reviewed- RRT last evening for rapid atrial flutter. Per pulm note, Possible bronchoscopy today. Per palliative on 6/28: There are no further PM needs at this time. PM will now sign off. If new PM needs arise, please re-consult. Pt's discharge goal is to return home. Pt resides at home independently[facility]. Await clinical progress. 1440: Backdoor referral made to Etelvina Colon at [facility] pt has Criteria and they would be able to accept- Pt would need precert to admit to [facility]. Await clinical progress/plan of care.

## 2021-06-29 NOTE — PROGRESS NOTES
Pulmonary Progress Note for Jasmina    Admit Date: 2021  Hospital day                               PCP: Casa Moran DO    Chief Complaint (s):  Patient Active Problem List   Diagnosis    Acute non-recurrent maxillary sinusitis    Small cell lung carcinoma ([facility] [address]    Acute on chronic respiratory failure with hypoxia ([facility] [address]    Severe protein-calorie malnutrition (HCC)    Typical atrial flutter (HCC)    Pneumonia of left lower lobe due to infectious organism    Pleural effusion       Subjective:  · Today's bronchoscopy was canceled because of atrial fibrillation and ST segment changes as well as oxygen needs. Vitals:  VITALS:  BP (!) 94/53   Pulse 91   Temp 97.3 °F (36.3 °C) (Temporal)   Resp 26   Ht 5' 7\" (1.702 m)   Wt 100 lb (45.4 kg)   SpO2 97%   BMI 15.66 kg/m²     24HR INTAKE/OUTPUT:      Intake/Output Summary (Last 24 hours) at 2021 1415  Last data filed at 2021 1200  Gross per 24 hour   Intake 10 ml   Output 700 ml   Net -690 ml       24HR PULSE OXIMETRY RANGE:    SpO2  Av.8 %  Min: 92 %  Max: 98 %    Medications:  IV:   sodium chloride         Scheduled Meds:   sterile water        enoxaparin  1 mg/kg Subcutaneous BID    digoxin  125 mcg Intravenous Daily    metoprolol  2.5 mg Intravenous Q6H    OLANZapine  5 mg Oral Nightly    [Held by provider] metoprolol tartrate  25 mg Oral BID    methylPREDNISolone  40 mg Intravenous Daily    cefTRIAXone (ROCEPHIN) IV  1,000 mg Intravenous Q24H    doxycycline (VIBRAMYCIN) IV  100 mg Intravenous Q12H    sodium chloride flush  5-40 mL Intravenous 2 times per day    famotidine  20 mg Oral BID    [Held by provider] nicotine  1 patch Transdermal Daily       Diet:   Diet NPO     EXAM:  General: No distress. Sleeping. Cachectic  Eyes: PERRL. No sclera icterus. No conjunctival injection. ENT: No discharge. Pharynx clear. Neck: Trachea midline. Normal thyroid. Resp: No accessory muscle use. LLL rales.  No previous. CT HEAD WO CONTRAST   Final Result   No acute intracranial abnormality. XR ABDOMEN FOR NG/OG/NE TUBE PLACEMENT   Final Result   Extravasation of contrast following Gastrografin injection suggesting   abnormal position of gastrostomy tube. XR ABDOMEN FOR NG/OG/NE TUBE PLACEMENT   Final Result   Tip of the gastric tube in proper position. Recent postoperative changes as above commented. CTA CHEST W CONTRAST   Final Result   No evidence of pulmonary embolism. Again noted are multiple areas of consolidation and scattered patchy   opacities, progressed since the previous exam and is concerning for   multifocal airspace disease process. Moderate size right pleural effusion with pleural metastatic implants are   again noted. Large right perihilar mass with extension to the mediastinum is again noted. Significant mediastinal lymphadenopathy including supraclavicular lymph nodes   is again noted suggestive of disease extension. CT ABDOMEN PELVIS WO CONTRAST Additional Contrast? None   Final Result   Extensive postoperative free air in the abdomen. Air also identified in the anterior abdominal wall subcutaneous tissues. Consolidation in the bilateral lung bases may be secondary to atelectasis   postoperative versus superimposed pneumonia in the proper clinical setting. Partially imaged right-sided pleural effusion. FL MODIFIED BARIUM SWALLOW W VIDEO   Final Result   Positive aspiration to thin and nectar consistencies of barium contrast were. Please see separate speech pathology report for full discussion of findings   and recommendations. US THORACENTESIS Which side should the procedure be performed? Right   Final Result   Successful ultrasound guided thoracentesis. XR CHEST PORTABLE   Final Result   Right-sided chest port in good position. No pneumothorax.       Moderate right-sided pleural effusions unchanged. FLUORO FOR SURGICAL PROCEDURES   Final Result   Intraprocedural fluoroscopic spot images as above. See separate procedure   report for more information. XR CHEST PORTABLE   Final Result   Stable abnormal chest with the mediastinal and hilar adenopathy with soft   tissue mass in the right hilum concerning for malignancy. Persistent bibasilar infiltrates and pleural effusion likely pneumonia or   edema. CTA CHEST W CONTRAST   Final Result   1. There is no evidence of a pulmonary embolus. 2. Moderate size right pleural effusion   3. Advanced emphysematous changes with superimposed pneumonia seen within the   left lower lobe and within the lingula. This finding is new when compared   with the previous CT scan of 06/11/2021 and PET-CT scan of 06/16/2021.   4. Large right perihilar mass with extension into the mediastinum consistent   with the history of metastatic small cell lung CA   5. Significant right hilar, mediastinal core right paratracheal and supra and   infraclavicular lymphadenopathy. 6. Pleural metastasis. Chronically distended esophagus secondary to the   metastatic lymphadenopathy within the inferior posterior mediastinum. XR CHEST PORTABLE   Final Result   No significant change         XR CHEST PORTABLE    (Results Pending)       Assessment:  1. Large right pleural effusion: Likely malignant, cytology either is pending but was not sent. 2. Aspiration pneumonia, left side. It is unclear why a swallowing evaluation was done with the patient clearly has no esophageal lumen. 3. Esophageal occlusion: Secondary to likely malignant mediastinal adenopathy with external compression as seen above  4. Confusion: Worrisome for brain metastasis  5. Abnormal placed in G-tube: It was replaced after the patient pulled his original tube out despite it being well sutured in place per the operative note. Plan:  1.  Continue aerosol treatments and vest treatments. 2. Bronchoscopy for pulmonary toilet. We will keep n.p.o. again and try tomorrow. 3. Replace the G-tube  4. Overall prognosis is grim. Time at the bedside, reviewing labs and radiographs, reviewing updated notes and consultations, discussing with staff and family was more than 45 minutes. Please note that voice recognition technology was used in the preparation of this note and make therefore it may contain inadvertent transcription errors. If the patient is a COVID 19 isolation patient, the above physical exam reflects that of the examining physician for the day. Adriana Willis MD,  M.SWATI., F.C.C.P.     [facility], [address] [place] [address], 201 [address] [place] [address]

## 2021-06-29 NOTE — SIGNIFICANT EVENT
Called for tachycardia. HR 160s, significant artifact. Given adenosine but her port access infiltrated so unclear if she got the dose. While iv access was being established her heart rate improved and appeared to be consistent with afib with rvr. Given 5 mg IV lopressor with no signficant improvement. Given cardizem bolus and started on drip. 250 cc ivf bolus.  Later notified he was agitated, trial a dose of zyprexa./     cctime 35 mins  Scott Zazueta MD 10:43 PM 06/28/21

## 2021-06-29 NOTE — PROGRESS NOTES
6/29/2021  4:41 PM      Comprehensive Nutrition Assessment    Type and Reason for Visit:  Reassess    Nutrition Recommendations/Plan: When medically feasible to resume EN (after bronch/NPO), recommend gradually increase to goal rate from Trickle feed to more closely meet needs:     TF RECOMMENDATION:  Standard w/ fiber (Jevity 1.5) @ 40 ml/hr + 1 protein modular daily  Will provide 960 ml tv, 1440 kcals, 61 gm pro, 730 ml free water (1544 kcals & 87 gm pro w/ protein modular)  Regimen will meet 100% est nutrient needs      Nutrition Assessment:  Pt continues to decline. Pt is NPO for a repeat Bronch on 6/30. On 6/28 PEG replaced after pt pulled out PEG . Pt remains nutritionally at risk d/t inadequate calorie/protein intake. TF rec and continue to monitor. Malnutrition Assessment:  Malnutrition Status:  Severe malnutrition    Context:  Chronic Illness     Findings of the 6 clinical characteristics of malnutrition:  Energy Intake:  Mild decrease in energy intake (Comment) (Pt stated prior to adm poor PO and appetite ~1-2wks d/t chemo)  Weight Loss:  1 - Mild weight loss (specify amount and time period) (26#s over 20months)     Body Fat Loss:  7 - Severe body fat loss Orbital, Triceps   Muscle Mass Loss:  7 - Severe muscle mass loss Temples (temporalis), Clavicles (pectoralis & deltoids), Hand (interosseous)  Fluid Accumulation:  No significant fluid accumulation     Strength:  Not Performed    Estimated Daily Nutrient Needs:  Energy (kcal):  8003-7411 (MSJ x SF 1.2); Weight Used for Energy Requirements:  Current     Protein (g):  70-80 (1.5.-1.8 g/kg);  Weight Used for Protein Requirements:  Current        Fluid (ml/day):  4155-5713; Method Used for Fluid Requirements:  1 ml/kcal      Nutrition Related Findings:  A&O x1, +BS, Abd Gastrostomy, +1.5 L I/Os, No Edema, Missing Teeth      Wounds:  Surgical Incision, Skin Tears       Current Nutrition Therapies:    Diet NPO  Current Tube Feeding (TF) Orders:  · Feeding Route: PEG  · Formula: Standard with Fiber  · Schedule: Continuous (trickle feeds)  · Goal TF & Flush Orders Provides: @ 10 ml/hr; 240 ml tv, 360 kcals, 15 gm pro, 182 ml free water      Anthropometric Measures:  · Height: 5' 7\" (170.2 cm)  · Current Body Weight: 100 lb (45.4 kg) (6/29)   · Admission Body Weight: 106 lb 4.2 oz (48.2 kg) (6/20 Bed Scale)    · Usual Body Weight: 131 lb 12.8 oz (59.8 kg) (10/05/2019 actual)     · Ideal Body Weight: 148 lbs; % Ideal Body Weight 67.6 %   · BMI: 15.7  · BMI Categories: Underweight (BMI less than 18.5)       Nutrition Diagnosis:   · In context of chronic illness, Severe malnutrition related to catabolic illness (2/2 Cancer on Chemo Tx) as evidenced by NPO or clear liquid status due to medical condition, poor intake prior to admission, weight loss, severe loss of subcutaneous fat, severe muscle loss      Nutrition Interventions:   Food and/or Nutrient Delivery:  Continue NPO (Resume TF and will be providing recommendation)  Nutrition Education/Counseling:  Education not indicated   Coordination of Nutrition Care:  Continue to monitor while inpatient    Goals:  Pt to chuck EN at goal rate       Nutrition Monitoring and Evaluation:   Behavioral-Environmental Outcomes:  None Identified   Food/Nutrient Intake Outcomes:  Enteral Nutrition Intake/Tolerance  Physical Signs/Symptoms Outcomes:  Biochemical Data, GI Status, Nausea or Vomiting, Fluid Status or Edema, Hemodynamic Status, Nutrition Focused Physical Findings, Skin, Weight     Discharge Planning:     Too soon to determine     Electronically signed by Emmy Homans, RD, CNSC, LD on 6/29/21 at 4:41 PM EDT    Contact: [phone]

## 2021-06-29 NOTE — PROGRESS NOTES
Inpatient Medical Oncology Progress Note    Subjective:  No fever. SOB. Today's bronchoscopy was canceled because of atrial fibrillation and ST segment changes as well as oxygen needs    Objective:  /71   Pulse 106   Temp 97.3 °F (36.3 °C) (Temporal)   Resp 24   Ht 5' 7\" (1.702 m)   Wt 100 lb (45.4 kg)   SpO2 94%   BMI 15.66 kg/m²   GENERAL: Alert, oriented x 3, tired  HEENT: PERRLA; EOMI. Oropharynx clear. NECK: Supple. Without lymphadenopathy. LUNGS: scattered wheezing tangela  CARDIOVASCULAR: Regular rate. No murmurs, rubs or gallops. ABDOMEN: Soft. Non-tender, non-distended. PEG tube  EXTREMITIES: Without clubbing, cyanosis, or edema. NEUROLOGIC: No focal deficits.    ECOG PS 2    Diagnostics:  Lab Results   Component Value Date    WBC 1.1 (L) 06/29/2021    HGB 12.0 (L) 06/29/2021    HCT 37.3 06/29/2021    MCV 88.6 06/29/2021    PLT 76 (L) 06/29/2021     Lab Results   Component Value Date     06/29/2021    K 4.1 06/29/2021    CL 99 06/29/2021    CO2 29 06/29/2021    BUN 23 (H) 06/29/2021    CREATININE 0.6 (L) 06/29/2021    GLUCOSE 100 (H) 06/29/2021    CALCIUM 8.9 06/29/2021    PROT 6.5 06/25/2021    LABALBU 2.9 (L) 06/25/2021    BILITOT 0.5 06/25/2021    ALKPHOS 62 06/25/2021    AST 17 06/25/2021    ALT 8 06/25/2021    LABGLOM >60 06/29/2021    GFRAA >60 06/29/2021     Impression/Plan:  62 y.o male with Extensive SCLC     PET/CT on 06/15/2021 Mass centered in the right hilar region extending into the mediastinum consistent with history of small cell lung cancer with metabolically active metastatic mediastinal lymphadenopathy extending into the bilateral supraclavicular region and inferiorly into the retrocrural region.   Metabolically active right pleural metastases with corresponding pleural effusion  Metabolically active nodules in the skin of the abdomen are concerning for metastatic disease.   The esophagus is distended which appears to be secondary to compression by lower mediastinal lymphadenopathy.   Intense activity in the collapsed right lower lobe could be due to additional malignancy or postobstructive pneumonia.      Extensive SCLC, Recommended systemic therapy consisting of Carboplatin + Etoposide + Tecentriq. Side effects reviewed. He agreed to proceed.   Cycle # 1 Carboplatin + Etoposide + Tecentriq was on 06/16/2021  He had presented to the hospital on 06/18/2021 for worsening shortness of breath. CTA chest noted no PE. Moderate right pleural effusion  Advanced emphysematous changes with superimposed pneumonia seen within the with the previous CT scan of 06/11/2021 and PET-CT scan of 06/16/2021  Large right perihilar mass with extension into the mediastinum  Significant right hilar mediastinal core right paratracheal and supra and infraclavicular LN  Pleural metastasis. Chronically distended esophagus   The patient was admitted with acute COPD exacerbation, possible postobstructive pneumonia  The patient is doing better clinically. Pulmonary team on board  Today's bronchoscopy was canceled because of atrial fibrillation and ST segment changes as well as oxygen needs  IR guided right thoracentesis 06/22/2021; A total of 1200 cc of pleural effusion was removed  Continue IV steroids, nebs. Continue antibiotics  Continue to monitor his CBCD. Right subclavian Mediport 06/22/2021  Speech on board, failed swallow study. EGD was attempted 6/24 for PEG placement but extrinsic compression on the esophagus prevented the scope advancement. Open gastrostomy tube 06/25/2021. General surgery following. Ok to use G tube  Possible Bronch in am  Will continue to follow.     06/29/2021  Ravindra Salgado MD

## 2021-06-29 NOTE — PROGRESS NOTES
SPEECH LANGUAGE PATHOLOGY  DAILY PROGRESS NOTE        PATIENT NAME:  Héctor Slaughter      :  1960          TODAY'S DATE:  2021 ROOM:  [address] [place] [address]    Pt in bed. Nursing aid present at bedside. Wet respirations noted upon entering. Aid reported pt denied suctioning and is not coughing. Pt was unable to vocalize due to the severity and build up of secretions in the pharyngeal cavity. With max encouragement from SLP and aid, pt was able to cough and suction secretions. Strongly encouraged pt to continue suctioning throughout the day to prevent aspiration of secretions. Pt re-educated on risks of aspiration. Pt demonstrated limited understanding. CPT code(s) 83963  dysphagia tx   Total minutes :  15 minutes    Natalie M D'Amico M. A. CF-SLP [id number]  Speech Language Pathologist

## 2021-06-29 NOTE — PROGRESS NOTES
[facility] Progress Note    Admitting Date and Time: 6/18/2021  6:54 AM  Admit Dx: Acute on chronic respiratory failure with hypoxia (HCC) [J96.21]    Subjective:  Patient is being followed for Acute on chronic respiratory failure with hypoxia ([address] [J96.21]   Pt feels ok, still with shortness of breath and crackly voice  Went into Afib with RVR, given adenosine initially as it was thought to be SVT, but was clearer to be AFib with RVR when HR slowed down, given IV metoprolol with no improvement, and started on cardizem drip. Patient became confused last night and this morning, has restrains and a sitter    ROS: denies fever, chills, cp, n/v, HA unless stated above.      sterile water        enoxaparin  1 mg/kg Subcutaneous BID    digoxin  125 mcg Intravenous Daily    metoprolol  2.5 mg Intravenous Q6H    OLANZapine  5 mg Oral Nightly    [Held by provider] metoprolol tartrate  25 mg Oral BID    methylPREDNISolone  40 mg Intravenous Daily    cefTRIAXone (ROCEPHIN) IV  1,000 mg Intravenous Q24H    doxycycline (VIBRAMYCIN) IV  100 mg Intravenous Q12H    sodium chloride flush  5-40 mL Intravenous 2 times per day    famotidine  20 mg Oral BID    [Held by provider] nicotine  1 patch Transdermal Daily     levalbuterol, 0.63 mg, Q4H PRN  perflutren lipid microspheres, 1.5 mL, ONCE PRN  HYDROmorphone, 0.5 mg, Q3H PRN   Or  HYDROmorphone, 1 mg, Q3H PRN  naloxone, 0.4 mg, PRN  guaiFENesin, 400 mg, 4x Daily PRN  sodium chloride flush, 5-40 mL, PRN  sodium chloride, 25 mL, PRN  ondansetron, 4 mg, Q8H PRN   Or  ondansetron, 4 mg, Q6H PRN  polyethylene glycol, 17 g, Daily PRN  acetaminophen, 650 mg, Q6H PRN   Or  acetaminophen, 650 mg, Q6H PRN         Objective:    BP 98/65   Pulse 89   Temp 98 °F (36.7 °C) (Oral)   Resp 26   Ht 5' 7\" (1.702 m)   Wt 100 lb (45.4 kg)   SpO2 97%   BMI 15.66 kg/m²     General Appearance: alert and oriented to self, very cachetic   Skin: warm and dry  Head: normocephalic and atraumatic  Eyes: pupils equal, round, and reactive to light, extraocular eye movements intact, conjunctivae normal  Neck: neck supple and non tender without mass   Pulmonary/Chest: crackles bilaterally- no wheezes  Cardiovascular: normal rate, normal S1 and S2 and no carotid bruits  Abdomen: soft, non-tender, non-distended, normal bowel sounds, no masses or organomegaly  Extremities: no cyanosis, no clubbing and no edema  Neurologic: no cranial nerve deficit and speech normal        Recent Labs     06/27/21  0245 06/28/21  0239 06/29/21  0331    135 139   K 3.8 3.6 4.1   CL 99 97* 99   CO2 28 30* 29   BUN 16 14 23*   CREATININE 0.3* 0.3* 0.6*   GLUCOSE 101* 96 100*   CALCIUM 8.6 8.6 8.9       Recent Labs     06/27/21 0245 06/28/21 0239 06/29/21  0331   WBC 1.8* 1.4* 1.1*   RBC 4.07 3.90 4.21   HGB 11.7* 11.2* 12.0*   HCT 35.7* 33.7* 37.3   MCV 87.7 86.4 88.6   MCH 28.7 28.7 28.5   MCHC 32.8 33.2 32.2   RDW 14.0 14.0 14.1   PLT 80* 69* 76*   MPV 8.5 9.0 9.0       Assessment:    Active Problems:    Small cell lung carcinoma (HCC)    Acute on chronic respiratory failure with hypoxia (HCC)    Severe protein-calorie malnutrition (HCC)    Typical atrial flutter (HCC)    Pneumonia of left lower lobe due to infectious organism    Pleural effusion  Resolved Problems:    * No resolved hospital problems. *      Plan:  1. Acute on chronic hypoxic respiratory failure in association with emphysema and lung cancer as well as right pleural effusion that is presumed to be malignant, not sure if fluid cytology was sent after thoracentesis. O2 sat was at 86%, currently requiring 6 L of oxygen obtain oxygen saturation above 90%. CTA of the chest was negative for PE, will need bronchoscopy  2. Dysphagia none due to to compress esophagus secondary to lung cancer, unable to place NG tube, status post G-tube placement with open approach. Not functioning well.    3.  Possible aspiration pneumonia, patient was started on doxycycline and Rocephin. 4.  Hypotension, started after the patient received his open G-tube placement, initially thought to be due to sedating medication from the OR as the patient received ketamine, fentanyl, Dilaudid, etomidate, Versed and bupivacaine. Status post Narcan which did not help, received several boluses of IV fluids, question septic picture, patient was transferred to the ICU where he remains for 2 nights and transferred out of the ICU. CT of the abdomen showed postsurgical changes. 5.  Acute exacerbation of COPD, continue steroids and antibiotics as well as bronchodilators. 6.  New onset A. fib with RVR, normal TSH, still in and out of RVR, back on cardizem drip, sswitched metoprolol to IV 2.5 mg q6, on digoxin 125 mcg IV daily. Cannot do HARPER cardioversion due to obstructive esophagus, [id number] score is 0 but with increased risk of clotting due to cancer the patient was started on Lovenox full dose 50 mg twice daily. 7.  Goals of care, patient is full code, palliative care was involved and patient apparently has poor insight yet he is the decision-maker and he continues to be full code, patient has very poor prognosis, he reported that he wouldn't want to be resuscitated but he needed to talk to family first.       NOTE: This report was transcribed using voice recognition software. Every effort was made to ensure accuracy; however, inadvertent computerized transcription errors may be present.   Electronically signed by Irina Matos MD on 6/29/2021 at 8:19 AM

## 2021-06-29 NOTE — PROGRESS NOTES
OCCUPATIONAL THERAPY INITIAL EVALUATION      Date:2021  Patient Name: Gavin Johns  MRN: [id number]  : 1960  Room: [address] [place] [address]      Evaluating OT: Liam Perez OTR/L [id number]    Referring Provider: Mary Alice Rueda MD  Specific Provider Orders/Date: OT eval and treat 2021      Diagnosis: Acute on chronic respiratory failure with hypoxia     Surgery:   Past Surgical History:   Procedure Laterality Date    BRONCHOSCOPY N/A 6/3/2021    BRONCHOSCOPY W/EBUS FNA performed by Sofiya Allen MD at [facility] 6/3/2021    BRONCHOSCOPY performed by Sofiya Allen MD at [facility] [address] N/A 2021    OPEN GASTROSTOMY TUBE performed by Reza Foster MD at [address] [place] [address] N/A 2021    MEDI PORT INSERTION performed by Reza Foster MD at [place] [address] Right 2021    RIGHT THORACENTESIS ULTRASOUND performed by Sofiya Allen MD at [address] [place] [address] ENDOSCOPY N/A 2021    EGD DIAGNOSTIC ONLY performed by Reza Foster MD at [facility] ENDOSCOPY        Pertinent Medical History: Lung Cancer, Alcohol abuse   S/P port surgery 2021  s/p open gastrostomy placement due to esophageal compression 2021     Past Medical History:   Diagnosis Date    Alcohol abuse     Cancer ([address]     Lung    Lung mass     right    Seasonal allergies     Tobacco abuse          Past Surgical History:   Procedure Laterality Date    BRONCHOSCOPY N/A 6/3/2021    BRONCHOSCOPY W/EBUS FNA performed by Sofiya Allen MD at [facility] 6/3/2021    BRONCHOSCOPY performed by Sofiya Allen MD at [facility] [address] N/A 2021    OPEN GASTROSTOMY TUBE performed by Reza Foster MD at [address] [place] [address] N/A 2021    MEDI PORT INSERTION performed by Reza Foster MD at [facility] THORACENTESIS Right 5/17/2021    RIGHT THORACENTESIS ULTRASOUND performed by Leanne Rivero MD at [facility] ENDOSCOPY N/A 6/24/2021    EGD DIAGNOSTIC ONLY performed by Patrice Rosales MD at [address] [place] [address] Drive of current deficits    [] Functional mobility  [x]ADLs  [x] Strength               []Cognition    [x] Functional transfers   [x] IADLs         [x] Safety Awareness   [x]Endurance    [x] Fine Coordination              [x] Balance      [] Vision/perception   [x]Sensation     []Gross Motor Coordination  [] ROM  [] Delirium                   [] Motor Control     OT PLAN OF CARE   OT POC based on physician orders, patient diagnosis and results of clinical assessment    Frequency/Duration 1-3 days/wk for 2 weeks PRN   Specific OT Treatment Interventions to include:   * Instruction/training on adapted ADL techniques and AE recommendations to increase functional independence within precautions       * Training on energy conservation strategies, correct breathing pattern and techniques to improve independence/tolerance for self-care routine  * Functional transfer/mobility training/DME recommendations for increased independence, safety, and fall prevention  * Patient/Family education to increase follow through with safety techniques and functional independence  * Recommendation of environmental modifications for increased safety with functional transfers/mobility and ADLs  * Therapeutic exercise to improve motor endurance, ROM, and functional strength for ADLs/functional transfers  * Therapeutic activities to facilitate/challenge dynamic balance, stand tolerance for increased safety and independence with ADLs  * Therapeutic activities to facilitate gross/fine motor skills for increased independence with ADLs      Recommended Adaptive Equipment:   To be further assessed      Precautions:  Falls, High flow O2     Home Living: Pt lives alone  in a 2.5 story with 3 step(s) to enter and 2 rail(s); bed/bath on second level    Bathroom setup: difficult to obtain information from pt  Equipment owned: unknown- difficult to obtain information from pt   Prior Level of Function: Per patient  Independent  with ADLs , Independent  with IADLs; using no AD for ambulation. Driving: unknown    Pain Level: Pt did not indicate pain at present time   Cognition: A&O: 2/3; person and place generally  Follows 1 step directions   Memory:  fair    Sequencing:  fair    Problem solving:  fair    Judgement/safety:  fair      Functional Assessment: AM-PAC Daily Activity Raw Score: 14/24   Initial Eval Status  Date: 6/29/21 Treatment Status  Date: STG/LTG  Frequency/duration  2-3x/week, 5-7 days   Feeding Minimal Assist   Independent    Grooming Minimal Assist seated at EOB   Moderate [facility]    UB Dressing Minimal Assist   Independent    LB Dressing Maximal Assist   Moderate Assist    Bathing Maximal Assist  Moderate Assist    Toileting Moderate A using urinal and bed pan   Minimal Assist    Bed Mobility  Supine to sit: Min A    Sit to supine: Independent   Supine to sit: Independent   Sit to supine: Independent    Functional Transfers Sit to stand: NT   Stand to sit: NT   Stand pivot: NT  Nursing requested to only sit pt at EOB       Functional Mobility  (Ambulation) NT      Balance Sitting:     Static:  Fair    Dynamic:Fair-  Standing: NT     Activity Tolerance Poor     Visual/  Perceptual Glasses: reading           BUE  ROM/Strength/  Fine motor Coordination RUE: ROM WFL     Strength: grossly 4-/5      Strength:  WFL     Coordination: WFL    LUE: ROM  WFL     Strength: grossly 4-/5      Strength:  WFL     Coordination: WFL       Hand dominance: R     Hearing: WFL  Sensation:  No c/o numbness or tingling  Tone:  WFL  Edema: None noted                                   Comments: Upon arrival, patient supine in bed .   At end of session, patient supine in bed  with call light and phone within reach, all lines and tubes intact. Pt demonstrating fair  understanding of education/techniques. Patient would benefit from continued skilled OT  to improve safety, functional independence and quality of life. ARehab Potential: Good for established goals    Patient / Family Goal:  Not stated     Evaluation time includes thorough review of current medical information, gathering information on past medical & social history & PLOF, completion of standardized testing, informal observation of tasks, consultation with other medical professions/disciplines, assessment of data & development of POC/goals.      Evaluation Complexity: Low    Time In: 12:30   Time Out:  12:40     Total Time: 10          Treatment Charges: Mins Units   Ther Ex  [id number]     Manual Therapy Yuliana Baez [id number]     ADL/Home Mgt [id number]     Neuro Re-ed [id number]     Group Therapy      Orthotic manage/training  [id number]     Non-Billable Time     Total Timed Treatment          Danielle Welsh OTR/L [id number]

## 2021-06-29 NOTE — PROGRESS NOTES
INPATIENT CARDIOLOGY FOLLOW-UP    Name: Maralee Litten    Age: 62 y.o. Date of Admission: 6/18/2021  6:54 AM    Date of Service: 6/29/2021    Primary Cardiologist: Known to me from this admission    Chief Complaint: Follow-up for new onset atrial flutter    Interim History:  RRT last evening for rapid atrial flutter in the 160s. Put back on diltiazem infusion. Got another 500 mcg of IV digoxin (had already been loaded and was on daily dose so we need to watch dig levels). He actually converted to sinus rhythm around midnight. Possible bronchoscopy today. Still gurgling on secretions. States he wants to go home.     Review of Systems:   Negative except as described above    Problem List:  Patient Active Problem List   Diagnosis    Acute non-recurrent maxillary sinusitis    Small cell lung carcinoma ([facility] [address]    Acute on chronic respiratory failure with hypoxia (HCC)    Severe protein-calorie malnutrition (HCC)    Typical atrial flutter (HCC)    Pneumonia of left lower lobe due to infectious organism    Pleural effusion       Current Medications:    Current Facility-Administered Medications:     sterile water injection, , , ,     enoxaparin (LOVENOX) injection 50 mg, 1 mg/kg, Subcutaneous, BID, Betty Amaya MD, 50 mg at 06/28/21 1011    digoxin (LANOXIN) injection 125 mcg, 125 mcg, Intravenous, Daily, Gage Cristina MD, 125 mcg at 06/28/21 1011    metoprolol (LOPRESSOR) injection 2.5 mg, 2.5 mg, Intravenous, Q6H, Celsa Ospina MD, 2.5 mg at 06/29/21 0150    OLANZapine (ZYPREXA) tablet 5 mg, 5 mg, Oral, Nightly, Jazmín oLngoria MD, 5 mg at 06/29/21 0042    [Held by provider] metoprolol tartrate (LOPRESSOR) tablet 25 mg, 25 mg, Oral, BID, Gage Cristina MD    levalbuterol Jennifer Shoulder) nebulization 0.63 mg, 0.63 mg, Nebulization, Q4H PRN, Jayy Myrick MD, 0.63 mg at 06/27/21 1606    perflutren lipid microspheres (DEFINITY) injection 1.65 mg, 1.5 mL, Intravenous, ONCE PRN, Juan Francois KAVYA Apple    HYDROmorphone (DILAUDID) injection 0.5 mg, 0.5 mg, Intravenous, Q3H PRN, 0.5 mg at 06/26/21 0403 **OR** HYDROmorphone (DILAUDID) injection 1 mg, 1 mg, Intravenous, Q3H PRN, Bruno Edwards MD    naloxone [facility]) injection 0.4 mg, 0.4 mg, Intravenous, PRN, Raguel Lombard, MD    methylPREDNISolone sodium (SOLU-MEDROL) injection 40 mg, 40 mg, Intravenous, Daily, Bruno Edwards MD, 40 mg at 06/28/21 1011    guaiFENesin tablet 400 mg, 400 mg, Oral, 4x Daily PRN, Bruno Edwards MD, 400 mg at 06/28/21 1137    cefTRIAXone (ROCEPHIN) 1,000 mg in sterile water 10 mL IV syringe, 1,000 mg, Intravenous, Q24H, Bruno Edwards MD, 1,000 mg at 06/28/21 1010    doxycycline (VIBRAMYCIN) 100 mg in dextrose 5 % 100 mL IVPB, 100 mg, Intravenous, Q12H, Bruno Edwards MD, Stopped at 06/28/21 1600    sodium chloride flush 0.9 % injection 5-40 mL, 5-40 mL, Intravenous, 2 times per day, Bruno Edwards MD, 10 mL at 06/28/21 1012    sodium chloride flush 0.9 % injection 5-40 mL, 5-40 mL, Intravenous, PRN, Bruno Edwards MD, 10 mL at 06/21/21 1034    0.9 % sodium chloride infusion, 25 mL, Intravenous, PRN, Bruno Edwards MD    ondansetron (ZOFRAN-ODT) disintegrating tablet 4 mg, 4 mg, Oral, Q8H PRN, 4 mg at 06/20/21 0859 **OR** ondansetron (ZOFRAN) injection 4 mg, 4 mg, Intravenous, Q6H PRN, Bruno Edwards MD    polyethylene glycol [facility]) packet 17 g, 17 g, Oral, Daily PRN, Bruno Edwards MD    acetaminophen (TYLENOL) tablet 650 mg, 650 mg, Oral, Q6H PRN **OR** acetaminophen (TYLENOL) suppository 650 mg, 650 mg, Rectal, Q6H PRN, Bruno Edwards MD    famotidine (PEPCID) tablet 20 mg, 20 mg, Oral, BID, Bruno Edwards MD, 20 mg at 06/28/21 1137    [Held by provider] nicotine (NICODERM CQ) 21 MG/24HR 1 patch, 1 patch, Transdermal, Daily, Bruno Edwards MD, 1 patch at 06/25/21 8569    Physical Exam:  BP 98/65   Pulse 89   Temp 98 °F (36.7 °C) (Oral)   Resp 26   Ht 5' 7\" (1.702 m)   Wt 100 lb (45.4 kg)   SpO2 97%   BMI 15.66 kg/m²   Wt Readings from Last 3 Encounters:   06/29/21 100 lb (45.4 kg)   06/16/21 104 lb 11.2 oz (47.5 kg)   06/14/21 106 lb 3.2 oz (48.2 kg)     Appearance: Ill-appearing gentleman, cachectic. Gurgling in his own secretions, difficult to understand speech  Skin: Intact, no rash  Head: Normocephalic, atraumatic  Eyes: EOMI, no conjunctival erythema  ENMT: No pharyngeal erythema, MMM, no rhinorrhea  Neck: Supple, no elevated JVP, no carotid bruits  Lungs: Rhonchi everywhere  Cardiac: Unable to hear heart tones because the noisy breath sounds  Abdomen: G tube/abdominal binder  Extremities: Moves all extremities x 4, skinny legs, no lower extremity edema  Neurologic: No focal motor deficits apparent, normal mood and affect  Peripheral Pulses: Intact posterior tibial pulses bilaterally    Intake/Output:    Intake/Output Summary (Last 24 hours) at 6/29/2021 0912  Last data filed at 6/29/2021 0742  Gross per 24 hour   Intake 0 ml   Output 700 ml   Net -700 ml     No intake/output data recorded. Laboratory Tests:  Recent Labs     06/27/21  0245 06/28/21 0239 06/29/21  0331    135 139   K 3.8 3.6 4.1   CL 99 97* 99   CO2 28 30* 29   BUN 16 14 23*   CREATININE 0.3* 0.3* 0.6*   GLUCOSE 101* 96 100*   CALCIUM 8.6 8.6 8.9     Lab Results   Component Value Date    MG 2.3 06/29/2021     No results for input(s): ALKPHOS, ALT, AST, PROT, BILITOT, BILIDIR, LABALBU in the last 72 hours.   Recent Labs     06/27/21  0245 06/28/21 0239 06/29/21  0331   WBC 1.8* 1.4* 1.1*   RBC 4.07 3.90 4.21   HGB 11.7* 11.2* 12.0*   HCT 35.7* 33.7* 37.3   MCV 87.7 86.4 88.6   MCH 28.7 28.7 28.5   MCHC 32.8 33.2 32.2   RDW 14.0 14.0 14.1   PLT 80* 69* 76*   MPV 8.5 9.0 9.0     Lab Results   Component Value Date    TROPONINI <0.01 05/07/2021     Lab Results   Component Value Date    INR 1.2 06/22/2021    INR 1.1 05/07/2021    PROTIME 13.5 (H) 06/22/2021    PROTIME 11.7 05/07/2021     Lab hours   Continue digoxin 125 mcg IV daily   Check digoxin level tomorrow   DC diltiazem infusion since he is back in sinus   Continue therapeutic enoxaparin as platelets remain greater than 50 K as per hematology recommendations   Not a candidate for HARPER cardioversion given esophageal obstruction and tenuous respiratory status   Pulmonary note reviewed, possible bronchoscopy today   Aggressive pulmonary toilet   G-tube issues per surgery   Prognosis remains poor and guarded   Consider hospice, patient maintaining full code, palliative following    Magnus Teixeira MD, [address] [facility] Cardiology    NOTE: This report was transcribed using voice recognition software. Every effort was made to ensure accuracy; however, inadvertent computerized transcription errors may be present.

## 2021-06-30 LAB
AADO2: 406.8 MMHG
ANION GAP SERPL CALCULATED.3IONS-SCNC: 8 MMOL/L (ref 7–16)
B.E.: 5.3 MMOL/L (ref -3–3)
B.E.: 8 MMOL/L (ref -3–3)
BASOPHILS ABSOLUTE: 0 E9/L (ref 0–0.2)
BASOPHILS RELATIVE PERCENT: 0 % (ref 0–2)
BUN BLDV-MCNC: 18 MG/DL (ref 6–20)
BURR CELLS: ABNORMAL
CALCIUM SERPL-MCNC: 9.5 MG/DL (ref 8.6–10.2)
CHLORIDE BLD-SCNC: 100 MMOL/L (ref 98–107)
CO2: 34 MMOL/L (ref 22–29)
COHB: 0.4 % (ref 0–1.5)
COHB: 0.6 % (ref 0–1.5)
COMMENT: ABNORMAL
CREAT SERPL-MCNC: 0.4 MG/DL (ref 0.7–1.2)
CRITICAL: ABNORMAL
CRITICAL: ABNORMAL
DATE ANALYZED: ABNORMAL
DATE ANALYZED: ABNORMAL
DATE OF COLLECTION: ABNORMAL
DATE OF COLLECTION: ABNORMAL
DIGOXIN LEVEL: 1.2 NG/ML (ref 0.8–2)
DIGOXIN LEVEL: >10 NG/ML (ref 0.8–2)
DOHLE BODIES: ABNORMAL
EKG ATRIAL RATE: 328 BPM
EKG P AXIS: -62 DEGREES
EKG Q-T INTERVAL: 352 MS
EKG QRS DURATION: 96 MS
EKG QTC CALCULATION (BAZETT): 411 MS
EKG R AXIS: 99 DEGREES
EKG T AXIS: -45 DEGREES
EKG VENTRICULAR RATE: 82 BPM
EOSINOPHILS ABSOLUTE: 0 E9/L (ref 0.05–0.5)
EOSINOPHILS RELATIVE PERCENT: 0 % (ref 0–6)
FIO2: 100 %
GFR AFRICAN AMERICAN: >60
GFR NON-AFRICAN AMERICAN: >60 ML/MIN/1.73
GLUCOSE BLD-MCNC: 87 MG/DL (ref 74–99)
HCO3: 35.8 MMOL/L (ref 22–26)
HCO3: 35.8 MMOL/L (ref 22–26)
HCT VFR BLD CALC: 33.9 % (ref 37–54)
HEMOGLOBIN: 11 G/DL (ref 12.5–16.5)
HHB: 0.5 % (ref 0–5)
HHB: 26.1 % (ref 0–5)
LAB: ABNORMAL
LAB: ABNORMAL
LYMPHOCYTES ABSOLUTE: 0.25 E9/L (ref 1.5–4)
LYMPHOCYTES RELATIVE PERCENT: 12.8 % (ref 20–42)
Lab: ABNORMAL
Lab: ABNORMAL
MAGNESIUM: 2.2 MG/DL (ref 1.6–2.6)
MCH RBC QN AUTO: 28.9 PG (ref 26–35)
MCHC RBC AUTO-ENTMCNC: 32.4 % (ref 32–34.5)
MCV RBC AUTO: 89.2 FL (ref 80–99.9)
METAMYELOCYTES RELATIVE PERCENT: 23.1 % (ref 0–1)
METHB: 0.2 % (ref 0–1.5)
METHB: 0.4 % (ref 0–1.5)
MODE: ABNORMAL
MODE: ABNORMAL
MONOCYTES ABSOLUTE: 0.1 E9/L (ref 0.1–0.95)
MONOCYTES RELATIVE PERCENT: 5.1 % (ref 2–12)
MYELOCYTE PERCENT: 11.1 % (ref 0–0)
NEUTROPHILS ABSOLUTE: 1.54 E9/L (ref 1.8–7.3)
NEUTROPHILS RELATIVE PERCENT: 47 % (ref 43–80)
NUCLEATED RED BLOOD CELLS: 0 /100 WBC
O2 CONTENT: 13 ML/DL
O2 CONTENT: 17.6 ML/DL
O2 SATURATION: 73.6 % (ref 92–98.5)
O2 SATURATION: 99.5 % (ref 92–98.5)
O2HB: 72.9 % (ref 94–97)
O2HB: 98.9 % (ref 94–97)
OPERATOR ID: 101
OPERATOR ID: 316
PATIENT TEMP: 37 C
PATIENT TEMP: 37 C
PCO2: 66.6 MMHG (ref 35–45)
PCO2: 88.2 MMHG (ref 35–45)
PDW BLD-RTO: 14.3 FL (ref 11.5–15)
PFO2: 2.15 MMHG/%
PH BLOOD GAS: 7.23 (ref 7.35–7.45)
PH BLOOD GAS: 7.35 (ref 7.35–7.45)
PHOSPHORUS: 3.1 MG/DL (ref 2.5–4.5)
PLATELET # BLD: 68 E9/L (ref 130–450)
PLATELET CONFIRMATION: NORMAL
PMV BLD AUTO: 9 FL (ref 7–12)
PO2: 214.6 MMHG (ref 75–100)
PO2: 44.7 MMHG (ref 75–100)
POIKILOCYTES: ABNORMAL
POLYCHROMASIA: ABNORMAL
POTASSIUM SERPL-SCNC: 3.5 MMOL/L (ref 3.5–5)
PROMYELOCYTES PERCENT: 0.9 % (ref 0–0)
RBC # BLD: 3.8 E12/L (ref 3.8–5.8)
RI(T): 190 %
SODIUM BLD-SCNC: 142 MMOL/L (ref 132–146)
SOURCE, BLOOD GAS: ABNORMAL
SOURCE, BLOOD GAS: ABNORMAL
THB: 12.3 G/DL (ref 11.5–16.5)
THB: 12.7 G/DL (ref 11.5–16.5)
TIME ANALYZED: 1508
TIME ANALYZED: 844
TOXIC GRANULATION: ABNORMAL
VACUOLATED NEUTROPHILS: ABNORMAL
WBC # BLD: 1.9 E9/L (ref 4.5–11.5)

## 2021-06-30 PROCEDURE — 2700000000 HC OXYGEN THERAPY PER DAY

## 2021-06-30 PROCEDURE — 2060000000 HC ICU INTERMEDIATE R&B

## 2021-06-30 PROCEDURE — 82805 BLOOD GASES W/O2 SATURATION: CPT

## 2021-06-30 PROCEDURE — 93010 ELECTROCARDIOGRAM REPORT: CPT | Performed by: INTERNAL MEDICINE

## 2021-06-30 PROCEDURE — 2500000003 HC RX 250 WO HCPCS: Performed by: INTERNAL MEDICINE

## 2021-06-30 PROCEDURE — 6360000002 HC RX W HCPCS: Performed by: STUDENT IN AN ORGANIZED HEALTH CARE EDUCATION/TRAINING PROGRAM

## 2021-06-30 PROCEDURE — 2580000003 HC RX 258: Performed by: STUDENT IN AN ORGANIZED HEALTH CARE EDUCATION/TRAINING PROGRAM

## 2021-06-30 PROCEDURE — 93005 ELECTROCARDIOGRAM TRACING: CPT | Performed by: INTERNAL MEDICINE

## 2021-06-30 PROCEDURE — 6370000000 HC RX 637 (ALT 250 FOR IP): Performed by: STUDENT IN AN ORGANIZED HEALTH CARE EDUCATION/TRAINING PROGRAM

## 2021-06-30 PROCEDURE — 36415 COLL VENOUS BLD VENIPUNCTURE: CPT

## 2021-06-30 PROCEDURE — 94669 MECHANICAL CHEST WALL OSCILL: CPT

## 2021-06-30 PROCEDURE — 6360000002 HC RX W HCPCS: Performed by: INTERNAL MEDICINE

## 2021-06-30 PROCEDURE — 80048 BASIC METABOLIC PNL TOTAL CA: CPT

## 2021-06-30 PROCEDURE — 83735 ASSAY OF MAGNESIUM: CPT

## 2021-06-30 PROCEDURE — 80162 ASSAY OF DIGOXIN TOTAL: CPT

## 2021-06-30 PROCEDURE — 85025 COMPLETE CBC W/AUTO DIFF WBC: CPT

## 2021-06-30 PROCEDURE — 2500000003 HC RX 250 WO HCPCS: Performed by: STUDENT IN AN ORGANIZED HEALTH CARE EDUCATION/TRAINING PROGRAM

## 2021-06-30 PROCEDURE — 99291 CRITICAL CARE FIRST HOUR: CPT | Performed by: INTERNAL MEDICINE

## 2021-06-30 PROCEDURE — 6370000000 HC RX 637 (ALT 250 FOR IP): Performed by: INTERNAL MEDICINE

## 2021-06-30 PROCEDURE — 99233 SBSQ HOSP IP/OBS HIGH 50: CPT | Performed by: INTERNAL MEDICINE

## 2021-06-30 PROCEDURE — 2580000003 HC RX 258: Performed by: INTERNAL MEDICINE

## 2021-06-30 PROCEDURE — 94660 CPAP INITIATION&MGMT: CPT

## 2021-06-30 PROCEDURE — 84100 ASSAY OF PHOSPHORUS: CPT

## 2021-06-30 RX ORDER — 0.9 % SODIUM CHLORIDE 0.9 %
250 INTRAVENOUS SOLUTION INTRAVENOUS ONCE
Status: DISCONTINUED | OUTPATIENT
Start: 2021-06-30 | End: 2021-06-30

## 2021-06-30 RX ORDER — OXYCODONE HCL 5 MG/5 ML
5 SOLUTION, ORAL ORAL EVERY 4 HOURS PRN
Status: DISCONTINUED | OUTPATIENT
Start: 2021-06-30 | End: 2021-07-02 | Stop reason: HOSPADM

## 2021-06-30 RX ORDER — 0.9 % SODIUM CHLORIDE 0.9 %
250 INTRAVENOUS SOLUTION INTRAVENOUS ONCE
Status: COMPLETED | OUTPATIENT
Start: 2021-06-30 | End: 2021-06-30

## 2021-06-30 RX ORDER — OXYCODONE HCL 5 MG/5 ML
10 SOLUTION, ORAL ORAL EVERY 4 HOURS PRN
Status: DISCONTINUED | OUTPATIENT
Start: 2021-06-30 | End: 2021-07-02 | Stop reason: HOSPADM

## 2021-06-30 RX ADMIN — TBO-FILGRASTIM 300 MCG: 300 INJECTION, SOLUTION SUBCUTANEOUS at 11:43

## 2021-06-30 RX ADMIN — DILTIAZEM HYDROCHLORIDE 20 MG: 5 INJECTION INTRAVENOUS at 05:51

## 2021-06-30 RX ADMIN — OLANZAPINE 5 MG: 5 TABLET, FILM COATED ORAL at 22:29

## 2021-06-30 RX ADMIN — Medication 10 ML: at 22:30

## 2021-06-30 RX ADMIN — ENOXAPARIN SODIUM 50 MG: 60 INJECTION SUBCUTANEOUS at 10:39

## 2021-06-30 RX ADMIN — METHYLPREDNISOLONE SODIUM SUCCINATE 40 MG: 40 INJECTION, POWDER, LYOPHILIZED, FOR SOLUTION INTRAMUSCULAR; INTRAVENOUS at 10:37

## 2021-06-30 RX ADMIN — METOPROLOL TARTRATE 2.5 MG: 5 INJECTION INTRAVENOUS at 01:25

## 2021-06-30 RX ADMIN — FAMOTIDINE 20 MG: 20 TABLET ORAL at 10:38

## 2021-06-30 RX ADMIN — HYDROMORPHONE HYDROCHLORIDE 1 MG: 1 INJECTION, SOLUTION INTRAMUSCULAR; INTRAVENOUS; SUBCUTANEOUS at 04:49

## 2021-06-30 RX ADMIN — Medication 10 ML: at 10:53

## 2021-06-30 RX ADMIN — DOXYCYCLINE 100 MG: 100 INJECTION, POWDER, LYOPHILIZED, FOR SOLUTION INTRAVENOUS at 14:16

## 2021-06-30 RX ADMIN — WATER 1000 MG: 1 INJECTION INTRAMUSCULAR; INTRAVENOUS; SUBCUTANEOUS at 10:37

## 2021-06-30 RX ADMIN — AMIODARONE HYDROCHLORIDE 1 MG/MIN: 50 INJECTION, SOLUTION INTRAVENOUS at 18:02

## 2021-06-30 RX ADMIN — AMIODARONE HYDROCHLORIDE 150 MG: 50 INJECTION, SOLUTION INTRAVENOUS at 17:36

## 2021-06-30 RX ADMIN — SODIUM CHLORIDE 250 ML: 9 INJECTION, SOLUTION INTRAVENOUS at 16:49

## 2021-06-30 RX ADMIN — DOXYCYCLINE 100 MG: 100 INJECTION, POWDER, LYOPHILIZED, FOR SOLUTION INTRAVENOUS at 04:50

## 2021-06-30 RX ADMIN — ENOXAPARIN SODIUM 50 MG: 60 INJECTION SUBCUTANEOUS at 22:30

## 2021-06-30 RX ADMIN — AMIODARONE HYDROCHLORIDE 0.5 MG/MIN: 50 INJECTION, SOLUTION INTRAVENOUS at 22:28

## 2021-06-30 RX ADMIN — NALXONE HYDROCHLORIDE 0.4 MG: 0.4 INJECTION INTRAMUSCULAR; INTRAVENOUS; SUBCUTANEOUS at 08:24

## 2021-06-30 RX ADMIN — METOPROLOL TARTRATE 2.5 MG: 5 INJECTION INTRAVENOUS at 08:44

## 2021-06-30 RX ADMIN — AMIODARONE HYDROCHLORIDE 1 MG/MIN: 50 INJECTION, SOLUTION INTRAVENOUS at 22:28

## 2021-06-30 RX ADMIN — FAMOTIDINE 20 MG: 20 TABLET ORAL at 22:29

## 2021-06-30 ASSESSMENT — PAIN SCALES - GENERAL
PAINLEVEL_OUTOF10: 0
PAINLEVEL_OUTOF10: 7

## 2021-06-30 NOTE — DISCHARGE INSTR - COC
Continuity of Care Form    Patient Name: Mohit Lo   :  1960  MRN:  [id number]    Admit date:  2021  Discharge date:  ***    Code Status Order: Full Code   Advance Directives:   Advance Care Flowsheet Documentation     Date/Time Healthcare Directive Type of Healthcare Directive Copy in [address] Agent's Name Healthcare Agent's Phone Number    21 0325  No, patient does not have an advance directive for healthcare treatment -- -- -- -- --    21 2150  No, patient does not have an advance directive for healthcare treatment -- -- -- -- --    21 1234  No, patient does not have an advance directive for healthcare treatment -- -- -- -- --          Admitting Physician:  Maki Max MD  PCP: Anna Houston DO    Discharging Nurse: [facility] Unit/Room#: 7011/5158-Z  Discharging Unit Phone Number: ***    Emergency Contact:   Extended Emergency Contact Information  Primary Emergency Contact: [address] Phone: [phone]  Relation: Child   needed? No    Past Surgical History:  Past Surgical History:   Procedure Laterality Date    BRONCHOSCOPY N/A 6/3/2021    BRONCHOSCOPY W/EBUS FNA performed by Yasmany Monaco MD at [address] [place] [address] N/A 6/3/2021    BRONCHOSCOPY performed by Yasmany Monaco MD at [address] [place] [address] N/A 2021    OPEN GASTROSTOMY TUBE performed by Gucci Nair MD at [address] [place] [address] N/A 2021    MEDI PORT INSERTION performed by Gucci Nair MD at [place] [address] Right 2021    RIGHT THORACENTESIS ULTRASOUND performed by Yasmany Monaco MD at [facility] N/A 2021    EGD DIAGNOSTIC ONLY performed by Gucci Nair MD at [facility] ENDOSCOPY       Immunization History:      There is no immunization history on file for this patient.     Active Problems:  Patient Active Problem List   Diagnosis Code    Acute non-recurrent maxillary sinusitis J01.00    Small cell lung carcinoma (HCC) C34.90    Acute on chronic respiratory failure with hypoxia (HCC) J96.21    Severe protein-calorie malnutrition ([facility] [address] E43    Typical atrial flutter (HCC) I48.3    Pneumonia of left lower lobe due to infectious organism J18.9    Pleural effusion J90       Isolation/Infection:   Isolation          No Isolation        Patient Infection Status     Infection Onset Added Last Indicated Last Indicated By Review Planned Expiration Resolved Resolved By    None active    Resolved    COVID-19 Rule Out 06/20/21 06/20/21 06/20/21 Respiratory Panel, Molecular, with COVID-19 (Restricted: peds pts or suitable admitted adults) (Ordered)   06/20/21 Rule-Out Test Resulted    COVID-19 Rule Out 06/17/21 06/17/21 06/18/21 COVID-19, Rapid (Ordered)   06/18/21 Rule-Out Test Resulted    COVID-19 Rule Out 05/28/21 05/28/21 05/28/21 Covid-19 Ambulatory (Ordered)   05/31/21 Rule-Out Test Resulted    COVID-19 Rule Out 05/17/21 05/17/21 05/17/21 COVID-19, Rapid (Ordered)   05/17/21 Rule-Out Test Resulted    COVID-19 Rule Out 05/07/21 05/07/21 05/07/21 COVID-19, Rapid (Ordered)   05/07/21 Rule-Out Test Resulted          Nurse Assessment:  Last Vital Signs: BP (!) 91/55   Pulse 91   Temp 97.6 °F (36.4 °C) (Axillary)   Resp 26   Ht 5' 7\" (1.702 m)   Wt 100 lb (45.4 kg)   SpO2 92%   BMI 15.66 kg/m²     Last documented pain score (0-10 scale): Pain Level: 7  Last Weight:   Wt Readings from Last 1 Encounters:   06/29/21 100 lb (45.4 kg)     Mental Status:  {IP PT MENTAL STATUS:20030}    IV Access:  {[facility] IV ACCESS:777305897}    Nursing Mobility/ADLs:  Walking   {[facility] DME SYAZ:603921882}  Transfer  {[facility] DME LYCR:851064779}  Bathing  {[facility] DME FSML:754367041}  Dressing  {[facility] DME KOEQ:029914040}  Toileting  {[facility] DME YHTO:872617610}  Feeding  {[facility] DME FJXI:157256046}  Med Admin  {P DME YCWO:860808073}  Med Delivery   [address] [employer] MED OBFDVLPE:528951883}    Wound Care Documentation and Therapy:  Wound 21 Radial Left 2x3 cm (Active)   Wound Etiology Skin Tear 21   Dressing Status Clean;Dry; Intact 21 0745   Wound Cleansed Cleansed with saline 21   Dressing/Treatment Foam 21   Wound Length (cm) 2 cm 21   Wound Width (cm) 3 cm 21 1500   Wound Surface Area (cm^2) 6 cm^2 21   Wound Assessment Other (Comment) 21   Drainage Amount Small 21   Drainage Description Sanguinous 21 0003   Odor None 21 0003   Number of days: 5        Elimination:  Continence:   · Bowel: {YES / DB:73359}  · Bladder: {YES / CF:87898}  Urinary Catheter: {Urinary Catheter:724213531}   Colostomy/Ileostomy/Ileal Conduit: {YES / ER:29858}       Date of Last BM: ***    Intake/Output Summary (Last 24 hours) at 2021 1058  Last data filed at 2021 0818  Gross per 24 hour   Intake 29 ml   Output 750 ml   Net -721 ml     I/O last 3 completed shifts:   In: 34 [I.V.:10; NG/GT:19]  Out: 750 [Urine:750]    Safety Concerns:     [address] [employer] Safety Concerns:253332439}    Impairments/Disabilities:      [employer] Impairments/Disabilities:181244840}    Nutrition Therapy:  Current Nutrition Therapy:   [address] [employer] Diet List:049240593}    Routes of Feeding: {[facility] DME Other Feedings:401348967}  Liquids: {Slp liquid thickness:81702}  Daily Fluid Restriction: {[facility] DME Yes amt example:644129301}  Last Modified Barium Swallow with Video (Video Swallowing Test): {Done Not Done PEFZ:343143153}    Treatments at the Time of Hospital Discharge:   Respiratory Treatments: ***  Oxygen Therapy:  {Therapy; copd oxygen:90599}  Ventilator:    {[facility] CC Vent NKQI:116588080}    Rehab Therapies: {THERAPEUTIC INTERVENTION:7470107688}  Weight Bearing Status/Restrictions: [address] [facility] Weight Bearin}  Other Medical Equipment (for information only, NOT a DME order): {EQUIPMENT:449539026}  Other Treatments: ***    Patient's personal belongings (please select all that are sent with patient):  {[facility] DME Belongings:689719556}    RN SIGNATURE:  {Esignature:113047065}    CASE MANAGEMENT/SOCIAL WORK SECTION    Inpatient Status Date: ***    Readmission Risk Assessment Score:  Readmission Risk              Risk of Unplanned Readmission:  24           Discharging to Facility/ Agency   · Name: [address] [place] [address]  · [address] [facility] [address]  · [address] [facility]  · [phone]    Dialysis Facility (if applicable)   · Name:  · Address:  · Dialysis Schedule:  · Phone:  · Fax:    / signature: Electronically signed by ZANDRA River on 6/30/2021 at 10:58 AM      PHYSICIAN SECTION    Prognosis: {Prognosis:6989187952}    Condition at Discharge: [facility] Patient Condition:780231366}    Rehab Potential (if transferring to Rehab): {Prognosis:3158125998}    Recommended Labs or Other Treatments After Discharge: ***    Physician Certification: I certify the above information and transfer of Jason Velasco  is necessary for the continuing treatment of the diagnosis listed and that he requires LTAC for {LESS:260154458} 30 days.      Update Admission H&P: {[facility] DME Changes in [facility]:403753864}    PHYSICIAN SIGNATURE:  {Esignature:738224720}

## 2021-06-30 NOTE — PROGRESS NOTES
Date: 6/30/2021    Time: 1:49 PM    Patient Placed On BIPAP/CPAP/ Non-Invasive Ventilation? Yes    If no must comment. Facial area red/color change? No           If YES are Blister/Lesion present? No   If yes must notify nursing staff  BIPAP/CPAP skin barrier?   Yes    Skin barrier type:mepilexlite       Comments:        Millie Wise RCP

## 2021-06-30 NOTE — PROGRESS NOTES
said it is too much air blowing on him. I was able to speak with him somewhat and he denies additional symptoms at this time, specifically shortness of breath, pain, nausea or vomiting. He says his mouth is very dry. His repeat labs were improved so hopefully he is able to have the BiPAP removed at least for some time. I did readdress CODE STATUS and he wishes to remain a full code and continue with all treatment. Inpatient medications reviewed: yes  Home Medications reviewed: yes    OBJECTIVE:   Prognosis: Poor    Physical Exam:  BP (!) 91/54   Pulse 116   Temp 97.6 °F (36.4 °C) (Axillary)   Resp 16   Ht 5' 7\" (1.702 m)   Wt 100 lb (45.4 kg)   SpO2 100%   BMI 15.66 kg/m²   Gen:  Elderly, cachectic, NAD, awake, alert  HEENT:  Normocephalic, atraumatic, mucosa dry, left eye with disconjugate gaze, EOMI, sclera anicteric  Neck:  Trachea midline  Lungs: BiPAP, congested cough, rhonchi, respirations unlabored  Heart[de-identified]  RRR, distant heart tones  Abd:  Soft, non tender, non distended, bowel sounds present  : Deferred  Ext: Sarcopenia, moving all extremities, no edema, pulses present, clubbing x10 fingers  Skin:  Warm and dry without rash, bruising, petechiae  Neuro:  Alert, oriented x 3; following commands    Objective data reviewed: labs, images, records, medication use, vitals and chart  Relevant data: Decreased white count, ANC 1540, platelets 29,575    Time/Communication  Greater than 50% of time spent, total 35 minutes in counseling and coordination of care at the bedside/over the telephone regarding goals of care, symptom management, diagnosis and prognosis and see above. Thank you for allowing Palliative Medicine to participate in the care of Isiah Quintero. Provider WANDY Velasquez PA-C  Palliative Medicine    Note: This report was completed using computerize voiced recognition software.   Every effort has been made to ensure accuracy; however, inadvertent computerized transcription errors may be present.

## 2021-06-30 NOTE — PROGRESS NOTES
Per nursing request, Dr Lawerence Bumpers paged for patient status update. Await return call.  Romelia vasquez

## 2021-06-30 NOTE — PROGRESS NOTES
Alejandra Aleman from the endoscopy dept informed this worker that Dr Jacquelin Black has cancelled Mr Radha Hem bronchoscopy for today, without a reschedule at this time, due to patient's status.  Romelia vasquez

## 2021-06-30 NOTE — PROGRESS NOTES
Inpatient Medical Oncology Progress Note    Subjective:  No fever. SOB. Again, bronchoscopy was canceled, the patient now with overt respiratory failure and hypercapnia. Objective:  BP 80/60   Pulse 153   Temp 97.9 °F (36.6 °C) (Axillary)   Resp 19   Ht 5' 7\" (1.702 m)   Wt 100 lb (45.4 kg)   SpO2 100%   BMI 15.66 kg/m²   GENERAL: confused, tired  HEENT: PERRLA; EOMI. Oropharynx dry  NECK: Supple. Without lymphadenopathy. LUNGS: scattered wheezing tangela  CARDIOVASCULAR: Tachycardic. ABDOMEN: Soft. Non-tender, non-distended. PEG tube  EXTREMITIES: Without clubbing, cyanosis, or edema.    ECOG PS 2-3    Diagnostics:  Lab Results   Component Value Date    WBC 1.9 (L) 06/30/2021    HGB 11.0 (L) 06/30/2021    HCT 33.9 (L) 06/30/2021    MCV 89.2 06/30/2021    PLT 68 (L) 06/30/2021     Lab Results   Component Value Date     06/30/2021    K 3.5 06/30/2021     06/30/2021    CO2 34 (H) 06/30/2021    BUN 18 06/30/2021    CREATININE 0.4 (L) 06/30/2021    GLUCOSE 87 06/30/2021    CALCIUM 9.5 06/30/2021    PROT 6.5 06/25/2021    LABALBU 2.9 (L) 06/25/2021    BILITOT 0.5 06/25/2021    ALKPHOS 62 06/25/2021    AST 17 06/25/2021    ALT 8 06/25/2021    LABGLOM >60 06/30/2021    GFRAA >60 06/30/2021     Impression/Plan:  62 y.o male with Extensive SCLC     PET/CT on 06/15/2021 Mass centered in the right hilar region extending into the mediastinum consistent with history of small cell lung cancer with metabolically active metastatic mediastinal lymphadenopathy extending into the bilateral supraclavicular region and inferiorly into the retrocrural region.   Metabolically active right pleural metastases with corresponding pleural effusion  Metabolically active nodules in the skin of the abdomen are concerning for metastatic disease.   The esophagus is distended which appears to be secondary to compression by lower mediastinal lymphadenopathy.   Intense activity in the collapsed right lower lobe could be due to additional malignancy or postobstructive pneumonia.      Extensive SCLC, Recommended systemic therapy consisting of Carboplatin + Etoposide + Tecentriq. Side effects reviewed. He agreed to proceed.   Cycle # 1 Carboplatin + Etoposide + Tecentriq was on 06/16/2021  He had presented to the hospital on 06/18/2021 for worsening shortness of breath. CTA chest noted no PE. Moderate right pleural effusion  Advanced emphysematous changes with superimposed pneumonia seen within the with the previous CT scan of 06/11/2021 and PET-CT scan of 06/16/2021  Large right perihilar mass with extension into the mediastinum  Significant right hilar mediastinal core right paratracheal and supra and infraclavicular LN  Pleural metastasis. Chronically distended esophagus   The patient was admitted with acute COPD exacerbation, possible postobstructive pneumonia  The patient is doing better clinically. Pulmonary team on board  Today's bronchoscopy was canceled because of atrial fibrillation and ST segment changes as well as oxygen needs  IR guided right thoracentesis 06/22/2021; A total of 1200 cc of pleural effusion was removed  Continue IV steroids, nebs. Continue antibiotics  Continue to monitor his CBCD. Right subclavian Mediport 06/22/2021  Speech on board, failed swallow study. EGD was attempted 6/24 for PEG placement but extrinsic compression on the esophagus prevented the scope advancement. Open gastrostomy tube 06/25/2021. General surgery following. Ok to use G tube  Bronchoscopy for pulmonary toilet again has been canceled due to the patient's tenuous status  Overall very poor prognosis. Hospice care recommended. Palliative care on board.     06/30/2021  Yahir Pitt MD

## 2021-06-30 NOTE — PROGRESS NOTES
Pulmonary Progress Note for Jasmina    Admit Date: 2021  Hospital day                               PCP: Wyatt Cowan DO    Chief Complaint (s):  Patient Active Problem List   Diagnosis    Acute non-recurrent maxillary sinusitis    [facility] cell lung carcinoma ([facility] [address] [facility]    Acute on chronic respiratory failure with hypoxia ([facility] [address] [facility]    Severe protein-calorie malnutrition (HCC)    Typical atrial flutter (HCC)    Pneumonia of left lower lobe due to infectious organism    Pleural effusion       Subjective:  · Again, bronchoscopy was canceled, the patient now with overt respiratory failure and hypercapnia. He is now on BiPAP. He appears comfortable and is awake and alert. Vitals:  VITALS:  BP 90/62   Pulse 116   Temp 97.6 °F (36.4 °C) (Axillary)   Resp 16   Ht 5' 7\" (1.702 m)   Wt 100 lb (45.4 kg)   SpO2 100%   BMI 15.66 kg/m²     24HR INTAKE/OUTPUT:      Intake/Output Summary (Last 24 hours) at 2021 1421  Last data filed at 2021 1348  Gross per 24 hour   Intake 19 ml   Output 1450 ml   Net -1431 ml       24HR PULSE OXIMETRY RANGE:    SpO2  Av.5 %  Min: 92 %  Max: 100 %    Medications:  IV:   sodium chloride         Scheduled Meds:   tbo-filgrastim  300 mcg Subcutaneous Daily    enoxaparin  1 mg/kg Subcutaneous BID    [Held by provider] digoxin  125 mcg Intravenous Daily    metoprolol  2.5 mg Intravenous Q6H    OLANZapine  5 mg Oral Nightly    [Held by provider] metoprolol tartrate  25 mg Oral BID    methylPREDNISolone  40 mg Intravenous Daily    cefTRIAXone (ROCEPHIN) IV  1,000 mg Intravenous Q24H    doxycycline (VIBRAMYCIN) IV  100 mg Intravenous Q12H    sodium chloride flush  5-40 mL Intravenous 2 times per day    famotidine  20 mg Oral BID    [Held by provider] nicotine  1 patch Transdermal Daily       Diet:   Diet NPO     EXAM:  General: No distress. On BiPAP  Eyes: PERRL. No sclera icterus. No conjunctival injection. ENT: No discharge.  Pharynx clear.  Neck: Trachea midline. Normal thyroid. Resp: No accessory muscle use. LLL rales. No wheezing. Diffuse rhonchi. CV: Regular rate. Regular rhythm. No murmur or rub. Abd: Binder in place  Skin: Warm and dry. No nodule on exposed extremities. No rash on exposed extremities. Ext: No cyanosis, clubbing, edema  Lymph: No cervical LAD. No supraclavicular LAD. M/S: No cyanosis. No joint deformity. Significant clubbing. Neuro: Awake. Follows commands. Positive pupils/gag/corneals. Normal pain response. Results:  CBC:   Recent Labs     06/28/21 0239 06/29/21  0331 06/30/21  0520   WBC 1.4* 1.1* 1.9*   HGB 11.2* 12.0* 11.0*   HCT 33.7* 37.3 33.9*   MCV 86.4 88.6 89.2   PLT 69* 76* 68*     BMP:   Recent Labs     06/28/21 0239 06/29/21  0331 06/30/21  0520    139 142   K 3.6 4.1 3.5   CL 97* 99 100   CO2 30* 29 34*   PHOS 2.5 2.8 3.1   BUN 14 23* 18   CREATININE 0.3* 0.6* 0.4*     LIVER PROFILE:   No results for input(s): AST, ALT, LIPASE, BILIDIR, BILITOT, ALKPHOS in the last 72 hours. Invalid input(s): AMYLASE,  ALB  PT/INR:   No results for input(s): PROTIME, INR in the last 72 hours. APTT: No results for input(s): APTT in the last 72 hours. Pathology:  1. N/A      Microbiology:  1. None    Recent ABG:   Recent Labs     06/30/21  0844   PH 7.226*   PO2 44.7*   PCO2 88.2*   HCO3 35.8*   BE 5.3*   O2SAT 73.6*   METHB 0.4   O2HB 72.9*   COHB 0.6   O2CON 13.0   HHB 26.1*   THB 12.7     FiO2 : 100 %       Recent Films:  XR CHEST PORTABLE   Final Result   1. Persistent moderate large size right side pleural effusion with bi   basilar infiltrates. See report CT chest of June 28.      2.  Status post recent abdominal surgery, free intraperitoneal air observed. The      3. The right axillar region subcutaneous emphysema. CT ABDOMEN PELVIS W IV CONTRAST Additional Contrast? None   Final Result   Moderate amount of free air, improved from previous.   Given history of recent surgery, this is presumably postoperative. Bowel perforation thought less   likely. Gastrostomy tube is within the stomach without extravasated contrast.      Partial visualization of right pleural effusion. Extensive consolidative opacities involving the right greater than left lower   lobe. Infiltrates from pneumonia should be considered. Continued follow-up   recommended. Minimal free fluid again identified in the abdomen/pelvis. Other findings are also similar to previous. CT HEAD WO CONTRAST   Final Result   No acute intracranial abnormality. XR ABDOMEN FOR NG/OG/NE TUBE PLACEMENT   Final Result   Extravasation of contrast following Gastrografin injection suggesting   abnormal position of gastrostomy tube. XR ABDOMEN FOR NG/OG/NE TUBE PLACEMENT   Final Result   Tip of the gastric tube in proper position. Recent postoperative changes as above commented. CTA CHEST W CONTRAST   Final Result   No evidence of pulmonary embolism. Again noted are multiple areas of consolidation and scattered patchy   opacities, progressed since the previous exam and is concerning for   multifocal airspace disease process. Moderate size right pleural effusion with pleural metastatic implants are   again noted. Large right perihilar mass with extension to the mediastinum is again noted. Significant mediastinal lymphadenopathy including supraclavicular lymph nodes   is again noted suggestive of disease extension. CT ABDOMEN PELVIS WO CONTRAST Additional Contrast? None   Final Result   Extensive postoperative free air in the abdomen. Air also identified in the anterior abdominal wall subcutaneous tissues. Consolidation in the bilateral lung bases may be secondary to atelectasis   postoperative versus superimposed pneumonia in the proper clinical setting. Partially imaged right-sided pleural effusion.          FL MODIFIED BARIUM SWALLOW W VIDEO   Final Result   Positive aspiration to thin and nectar consistencies of barium contrast were. Please see separate speech pathology report for full discussion of findings   and recommendations. US THORACENTESIS Which side should the procedure be performed? Right   Final Result   Successful ultrasound guided thoracentesis. XR CHEST PORTABLE   Final Result   Right-sided chest port in good position. No pneumothorax. Moderate right-sided pleural effusions unchanged. FLUORO FOR SURGICAL PROCEDURES   Final Result   Intraprocedural fluoroscopic spot images as above. See separate procedure   report for more information. XR CHEST PORTABLE   Final Result   Stable abnormal chest with the mediastinal and hilar adenopathy with soft   tissue mass in the right hilum concerning for malignancy. Persistent bibasilar infiltrates and pleural effusion likely pneumonia or   edema. CTA CHEST W CONTRAST   Final Result   1. There is no evidence of a pulmonary embolus. 2. Moderate size right pleural effusion   3. Advanced emphysematous changes with superimposed pneumonia seen within the   left lower lobe and within the lingula. This finding is new when compared   with the previous CT scan of 06/11/2021 and PET-CT scan of 06/16/2021.   4. Large right perihilar mass with extension into the mediastinum consistent   with the history of metastatic small cell lung CA   5. Significant right hilar, mediastinal core right paratracheal and supra and   infraclavicular lymphadenopathy. 6. Pleural metastasis. Chronically distended esophagus secondary to the   metastatic lymphadenopathy within the inferior posterior mediastinum. XR CHEST PORTABLE   Final Result   No significant change             Assessment:  1. Large right pleural effusion: Likely malignant, cytology either is pending but was not sent. 2. Aspiration pneumonia, left side.   It is unclear why a swallowing evaluation was done with the patient clearly has no esophageal lumen. 3. Esophageal occlusion: Secondary to likely malignant mediastinal adenopathy with external compression as seen above  4. Confusion: Worrisome for brain metastasis  5. Abnormal placed in G-tube: It was replaced after the patient pulled his original tube out despite it being well sutured in place per the operative note. Plan:  1. Continue aerosol treatments and vest treatments. We will recheck ABGs  2. Bronchoscopy for pulmonary toilet. This again has been canceled due to the patient's tenuous status. 3. Overall prognosis is grim. Hospice care is in order. Time at the bedside, reviewing labs and radiographs, reviewing updated notes and consultations, discussing with staff and family was more than 45 minutes. Please note that voice recognition technology was used in the preparation of this note and make therefore it may contain inadvertent transcription errors. If the patient is a COVID 19 isolation patient, the above physical exam reflects that of the examining physician for the day. Holger Farrell MD,  M.D., F.C.C.P.     [facility], [address] [place] [address], [address] [place] [address]

## 2021-06-30 NOTE — PROGRESS NOTES
INPATIENT CARDIOLOGY FOLLOW-UP    Name: Jason Velasco    Age: 62 y.o. Date of Admission: 6/18/2021  6:54 AM    Date of Service: 6/30/2021    Primary Cardiologist: Known to me from this admission    Chief Complaint: Follow-up for new onset atrial flutter    Interim History:  Predominantly maintained sinus rhythm throughout the day yesterday, last evening had a brief run of atrial flutter was given IV diltiazem and went back to sinus. Then around 6 AM went back into  2: 1 atrial flutter with a rate of 150 bpm.  He was also hypotensive this morning. Apparently was agitated overnight, got some ziprasidone and olanzapine last night. Got hydromorphone this morning. On my initial evaluation he was extremely lethargic, with almost agonal appearing breathing and minimally responsive to sternal rub. Morning blood work showed digoxin level came back at greater than 10 ng/mL. Electrolytes were fairly reasonable and he was not at all bradycardic, but was not clear whether some level of digoxin toxicity was contributing to his altered mental status as there were additional confounding factors of medications.     Review of Systems:   Unable to obtain in detail this morning    Problem List:  Patient Active Problem List   Diagnosis    Acute non-recurrent maxillary sinusitis    Small cell lung carcinoma ([facility] [address]    Acute on chronic respiratory failure with hypoxia (HCC)    Severe protein-calorie malnutrition (HCC)    Typical atrial flutter (HCC)    Pneumonia of left lower lobe due to infectious organism    Pleural effusion       Current Medications:    Current Facility-Administered Medications:     Tbo-Filgrastim (GRANIX) injection 300 mcg, 300 mcg, Subcutaneous, Daily, Karma Jones MD, 300 mcg at 06/29/21 1726    dilTIAZem injection 20 mg, 20 mg, Intravenous, PRN, Ramya Khan MD, 20 mg at 06/30/21 0551    dilTIAZem 100 mg in dextrose 5 % 100 mL infusion ([facility]), 10 mg/hr, Intravenous, PRN, Nereida Clement MD    enoxaparin (LOVENOX) injection 50 mg, 1 mg/kg, Subcutaneous, BID, Bo Boo MD, 50 mg at 06/29/21 2201    [Held by provider] digoxin (LANOXIN) injection 125 mcg, 125 mcg, Intravenous, Daily, Lexi Haile MD, 125 mcg at 06/29/21 1108    metoprolol (LOPRESSOR) injection 2.5 mg, 2.5 mg, Intravenous, Q6H, Jerad Calero MD, 2.5 mg at 06/30/21 0844    OLANZapine (ZYPREXA) tablet 5 mg, 5 mg, Oral, Nightly, Jazmín Longoria MD, 5 mg at 06/29/21 2201    [Held by provider] metoprolol tartrate (LOPRESSOR) tablet 25 mg, 25 mg, Oral, BID, Lexi Haile MD    [facility] nebulization 0.63 mg, 0.63 mg, Nebulization, Q4H PRN, Kathia Stapleton MD, 0.63 mg at 06/27/21 1606    perflutren lipid microspheres (DEFINITY) injection 1.65 mg, 1.5 mL, Intravenous, ONCE PRN, Jatinder Ramos.  KAVYA Apple    HYDROmorphone (DILAUDID) injection 0.5 mg, 0.5 mg, Intravenous, Q3H PRN, 0.5 mg at 06/26/21 0403 **OR** HYDROmorphone (DILAUDID) injection 1 mg, 1 mg, Intravenous, Q3H PRN, Marco House MD, 1 mg at 06/30/21 0449    naloxone [facility]) injection 0.4 mg, 0.4 mg, Intravenous, PRN, Kathia Stapleton MD, 0.4 mg at 06/30/21 0824    methylPREDNISolone sodium (SOLU-MEDROL) injection 40 mg, 40 mg, Intravenous, Daily, Marco House MD, 40 mg at 06/29/21 0367    guaiFENesin tablet 400 mg, 400 mg, Oral, 4x Daily PRN, Marco House MD, 400 mg at 06/28/21 1137    cefTRIAXone (ROCEPHIN) 1,000 mg in sterile water 10 mL IV syringe, 1,000 mg, Intravenous, Q24H, Marco House MD, 1,000 mg at 06/29/21 0936    doxycycline (VIBRAMYCIN) 100 mg in dextrose 5 % 100 mL IVPB, 100 mg, Intravenous, Q12H, Marco House MD, Stopped at 06/30/21 0630    sodium chloride flush 0.9 % injection 5-40 mL, 5-40 mL, Intravenous, 2 times per day, Marco House MD, 10 mL at 06/29/21 2202    sodium chloride flush 0.9 % injection 5-40 mL, 5-40 mL, Intravenous, PRN, Marco House MD, 10 mL at 06/29/21 1507   0.9 % sodium chloride infusion, 25 mL, Intravenous, PRN, Sophie Ratliff MD    ondansetron (ZOFRAN-ODT) disintegrating tablet 4 mg, 4 mg, Oral, Q8H PRN, 4 mg at 06/20/21 0859 **OR** ondansetron (ZOFRAN) injection 4 mg, 4 mg, Intravenous, Q6H PRN, Sophie Ratliff MD    polyethylene glycol [facility]) packet 17 g, 17 g, Oral, Daily PRN, Sophie Ratliff MD    acetaminophen (TYLENOL) tablet 650 mg, 650 mg, Oral, Q6H PRN **OR** acetaminophen (TYLENOL) suppository 650 mg, 650 mg, Rectal, Q6H PRN, Sophie Ratliff MD    famotidine (PEPCID) tablet 20 mg, 20 mg, Oral, BID, Sophie Ratliff MD, 20 mg at 06/29/21 2201    [Held by provider] nicotine (NICODERM CQ) 21 MG/24HR 1 patch, 1 patch, Transdermal, Daily, Sophie Ratliff MD, 1 patch at 06/25/21 1727    Physical Exam:  BP (!) 91/55   Pulse 91   Temp 97.6 °F (36.4 °C) (Axillary)   Resp 26   Ht 5' 7\" (1.702 m)   Wt 100 lb (45.4 kg)   SpO2 92%   BMI 15.66 kg/m²   Wt Readings from Last 3 Encounters:   06/29/21 100 lb (45.4 kg)   06/16/21 104 lb 11.2 oz (47.5 kg)   06/14/21 106 lb 3.2 oz (48.2 kg)     Appearance: Ill-appearing gentleman, cachectic. Looks worse than prior days. Very lethargic initially. Gurgling in his own secretions, difficult to understand speech  Skin: Intact, no rash  Head: Normocephalic, atraumatic  Eyes: EOMI, no conjunctival erythema  ENMT: No pharyngeal erythema, MMM, no rhinorrhea  Neck: Supple, no elevated JVP, no carotid bruits  Lungs: Rhonchi everywhere  Cardiac: Tachycardic.   Unable to hear heart tones because the noisy breath sounds  Abdomen: G tube/abdominal binder  Extremities: Moves all extremities x 4, skinny legs, no lower extremity edema  Neurologic: No focal motor deficits apparent, normal mood and affect  Peripheral Pulses: Intact posterior tibial pulses bilaterally    Intake/Output:    Intake/Output Summary (Last 24 hours) at 6/30/2021 1026  Last data filed at 6/30/2021 0818  Gross per 24 hour   Intake 29 ml Output 750 ml   Net -721 ml     No intake/output data recorded. Laboratory Tests:  Recent Labs     21  03321  0520    139 142   K 3.6 4.1 3.5   CL 97* 99 100   CO2 30* 29 34*   BUN 14 23* 18   CREATININE 0.3* 0.6* 0.4*   GLUCOSE 96 100* 87   CALCIUM 8.6 8.9 9.5     Lab Results   Component Value Date    MG 2.2 2021     No results for input(s): ALKPHOS, ALT, AST, PROT, BILITOT, BILIDIR, LABALBU in the last 72 hours. Recent Labs     21  0520   WBC 1.4* 1.1* 1.9*   RBC 3.90 4.21 3.80   HGB 11.2* 12.0* 11.0*   HCT 33.7* 37.3 33.9*   MCV 86.4 88.6 89.2   MCH 28.7 28.5 28.9   MCHC 33.2 32.2 32.4   RDW 14.0 14.1 14.3   PLT 69* 76* 68*   MPV 9.0 9.0 9.0     Lab Results   Component Value Date    TROPONINI <0.01 2021     Lab Results   Component Value Date    INR 1.2 2021    INR 1.1 2021    PROTIME 13.5 (H) 2021    PROTIME 11.7 2021     Lab Results   Component Value Date    TSH 3.550 2021     Lab Results   Component Value Date    LABA1C 5.7 (H) 2021     No results found for: EAG  Lab Results   Component Value Date    CHOL 93 2021     Lab Results   Component Value Date    TRIG 68 2021     Lab Results   Component Value Date    HDL 36 2021     Lab Results   Component Value Date    LDLCALC 43 2021     Lab Results   Component Value Date    LABVLDL 14 2021     No results found for: CHOLHDLRATIO  No results for input(s): PROBNP in the last 72 hours. Cardiac Tests:    EK: 1 atrial flutter 146 bpm    Telemetry: 2: 1 atrial flutter 150 -> atrial flutter 80s after metoprolol    Chest X-ray:   21    Impression   Right-sided chest port in good position.       No pneumothorax.       Moderate right-sided pleural effusions unchanged.      Echocardiogram: pending    Stress test:      Cardiac catheterization: ----------------------------------------------------------------------------------------------------------------------------------------------------------------  IMPRESSION:  1. New onset atrial flutter, onset 6/25/2021. In and out of flutter. 2. Elevated digoxin level. Greater than 10. Stat repeat 1.6 ng/mL. Thus likely lab error. No evidence of cardiac bradycardia arrhythmias attributable to digoxin toxicity  3. Acute hypoxic and hypercapnic respiratory failure. PCO2 80s with a pH of 7.23  4. Aspiration pneumonia  5. Pleural effusion  6. Recently diagnosed small cell lung cancer; metastasis to esophagus with distal esophageal compression  7. COPD  8. Long history of tobacco abuse  9. Status post open gastrostomy tube  10. Pancytopenia with recent drop in platelets 68  11. Severe malnutrition    RECOMMENDATIONS:  Patient doing very poorly. Initial concern this morning for digoxin toxicity based on blood work greater than 10 ng/mL with altered mental status, stat repeat showing was likely a lab error. Will hold off on digoxin TEJA at this time. Mental status improved after administration of naloxone. He is also being placed on BiPAP for hypercapnic respiratory failure.      Continue metoprolol 2.5 mg IV every 6 hours   Resume digoxin 125 mcg IV daily tomorrow   Check digoxin level tomorrow   If converts back to sinus will start amiodarone to help maintain sinus rhythm; or if becomes hemodynamically unstable with rapid atrial flutter will utilize amiodarone in that situation as well though less desirable as he has not been anticoagulated for sufficient duration to limit stroke risk with potential chemical cardioversion   Continue therapeutic enoxaparin as platelets remain greater than 50 K as per hematology recommendations   Not a candidate for HARPER cardioversion given esophageal obstruction and tenuous respiratory status   Aggressive pulmonary toilet; bronchoscopy canceled yesterday   G-tube issues per surgery   Prognosis remains very poor and guarded   Recommend hospice, patient maintaining full code, palliative following    Discussed in detail with Dr. Manas Lagos    Due to the immediate potential for life-threatening deterioration due to respiratory failure, tachycardia, altered mental status, I spent 35 minutes providing critical care. Ann Kuo MD, [address] [place] [address] Cardiology    NOTE: This report was transcribed using voice recognition software. Every effort was made to ensure accuracy; however, inadvertent computerized transcription errors may be present.

## 2021-06-30 NOTE — CARE COORDINATION
Requiring AVAPS. Bronch cancelled per pulm. Palliative care following-full code at present. S/p G tube on 6/25. Continues on iv abxs, iv steroid. Erla Nephew following( backdoor referral)- will need precert. From home alone PTA. [facility] also following. N-17 in epic, transport form on chart.   Janelle Benavides RN case manager

## 2021-06-30 NOTE — PROGRESS NOTES
Occupational Therapy  Patient treatment attempted this AM.  Patient on hold per nursing due to high heart rate. Will attempt at a later time.   Onetha Kassandra BRITTANY/L [id number]

## 2021-06-30 NOTE — PROGRESS NOTES
EKG obtained. Dr. Gabriella Haywood updated on rhythm and rate.   No new orders  Confirmed with CMR that patient converted to sinus rhythm at 5:08pm and IV amiodarone started at 536pm

## 2021-06-30 NOTE — PROGRESS NOTES
[facility] Progress Note    Admitting Date and Time: 6/18/2021  6:54 AM  Admit Dx: Acute on chronic respiratory failure with hypoxia (HCC) [J96.21]    Subjective:  Patient is being followed for Acute on chronic respiratory failure with hypoxia ([address] [J96.21]   Pt was mainly lethargic, responding to sternal rub, apparently last night he was confused agitated and combative, he received Geodon 20 mg IM x1, during the night he asked for pain medication and received 1 mg of IV Dilaudid. Interestingly patient dig level came back as greater than 10, we were not sure that this acute encephalopathy if it was related to the oversedation versus dig toxicity. Patient went back into A. fib with RVR on, we gave him Narcan and he became more awake yet he started having some repetitive movement in his right upper extremity. He was desaturating on several liters nasal cannula but he really mouth breathes. I obtained ABGs that showed respiratory acidosis with pH 7.2 and PCO2 of 88 with hypoxia PaO2 of 44. Blood pressure was 100/67. When the patient became more awake. Denied having chest pain or any other pain,  He reported that his breathing is okay. We ended up giving the patient IV metoprolol and his heart rate went down from 150 to 79. Discussed with cardiology. We will treat dig toxicity given that the neuro manifestation might be related to dig toxicity, will give Digibind. I will also start the patient on BiPAP.        digoxin immune paul  40 mg Intravenous Once    tbo-filgrastim  300 mcg Subcutaneous Daily    enoxaparin  1 mg/kg Subcutaneous BID    [Held by provider] digoxin  125 mcg Intravenous Daily    metoprolol  2.5 mg Intravenous Q6H    OLANZapine  5 mg Oral Nightly    [Held by provider] metoprolol tartrate  25 mg Oral BID    methylPREDNISolone  40 mg Intravenous Daily    cefTRIAXone (ROCEPHIN) IV  1,000 mg Intravenous Q24H    doxycycline (VIBRAMYCIN) IV  100 mg Intravenous Q12H    sodium chloride flush  5-40 mL Intravenous 2 times per day    famotidine  20 mg Oral BID    [Held by provider] nicotine  1 patch Transdermal Daily     dilTIAZem, 20 mg, PRN  dilTIAZem, 10 mg/hr, PRN  levalbuterol, 0.63 mg, Q4H PRN  perflutren lipid microspheres, 1.5 mL, ONCE PRN  HYDROmorphone, 0.5 mg, Q3H PRN   Or  HYDROmorphone, 1 mg, Q3H PRN  naloxone, 0.4 mg, PRN  guaiFENesin, 400 mg, 4x Daily PRN  sodium chloride flush, 5-40 mL, PRN  sodium chloride, 25 mL, PRN  ondansetron, 4 mg, Q8H PRN   Or  ondansetron, 4 mg, Q6H PRN  polyethylene glycol, 17 g, Daily PRN  acetaminophen, 650 mg, Q6H PRN   Or  acetaminophen, 650 mg, Q6H PRN         Objective:    BP (!) 76/50 Comment: post cardizem admin SIVP  Pulse 84   Temp 97.5 °F (36.4 °C) (Temporal)   Resp 22   Ht 5' 7\" (1.702 m)   Wt 100 lb (45.4 kg)   SpO2 97%   BMI 15.66 kg/m²     General Appearance: alert and oriented to self, very cachetic was lethargic than more awake after Narcan  Skin: warm and dry  Head: normocephalic and atraumatic  Eyes: pupils equal, round, and reactive to light, extraocular eye movements intact, conjunctivae normal  Neck: neck supple and non tender without mass   Pulmonary/Chest: crackles bilaterally- no wheezes  Cardiovascular: normal rate, normal S1 and S2 and no carotid bruits  Abdomen: soft, non-tender, non-distended, normal bowel sounds, no masses or organomegaly  Extremities: no cyanosis, no clubbing and no edema  Neurologic: no cranial nerve deficit, repetitive movements right upper extremity and hypercapnic        Recent Labs     06/28/21  0239 06/29/21  0331 06/30/21  0520    139 142   K 3.6 4.1 3.5   CL 97* 99 100   CO2 30* 29 34*   BUN 14 23* 18   CREATININE 0.3* 0.6* 0.4*   GLUCOSE 96 100* 87   CALCIUM 8.6 8.9 9.5       Recent Labs     06/28/21  0239 06/29/21  0331 06/30/21  0520   WBC 1.4* 1.1* 1.9*   RBC 3.90 4.21 3.80   HGB 11.2* 12.0* 11.0*   HCT 33.7* 37.3 33.9*   MCV 86.4 88.6 89.2   MCH 28.7 28.5 28.9   MCHC 33.2 32.2 32.4   RDW 14.0 14.1 14.3   PLT 69* 76* 68*   MPV 9.0 9.0 9.0       Assessment:    Active Problems:    Small cell lung carcinoma (HCC)    Acute on chronic respiratory failure with hypoxia (HCC)    Severe protein-calorie malnutrition (HCC)    Typical atrial flutter (HCC)    Pneumonia of left lower lobe due to infectious organism    Pleural effusion  Resolved Problems:    * No resolved hospital problems. *      Plan:  1. Acute on chronic hypoxic and hypercapnic respiratory failure in association with emphysema and lung cancer as well as right pleural effusion that is presumed to be malignant, not sure if fluid cytology was sent after thoracentesis. O2 sat was at 86%,   CTA of the chest was negative for PE. Given the hypercapnia will start patient on BiPAP I will contact pulmonary as the patient was supposed to have bronchoscopy today  2. Dysphagia none due to to compress esophagus secondary to lung cancer, unable to place NG tube, status post G-tube placement with open approach. Not functioning well. 3.  Possible aspiration pneumonia, patient was started on doxycycline and Rocephin. 4.  Hypotension, started after the patient received his open G-tube placement, initially thought to be due to sedating medication from the OR as the patient received ketamine, fentanyl, Dilaudid, etomidate, Versed and bupivacaine. Status post Narcan which did not help, received several boluses of IV fluids, question septic picture, patient was transferred to the ICU where he remains for 2 nights and transferred out of the ICU. CT of the abdomen showed postsurgical changes. 5.  Acute exacerbation of COPD, continue steroids and antibiotics as well as bronchodilators. 6.  New onset A. fib with RVR, normal TSH, still in and out of RVR, back on cardizem drip, switched metoprolol to IV 2.5 mg q6, on digoxin 125 mcg IV daily.  Cannot do HARPER cardioversion due to obstructive esophagus, [id number] score is 0 but with increased

## 2021-07-01 LAB
ANION GAP SERPL CALCULATED.3IONS-SCNC: 7 MMOL/L (ref 7–16)
BASOPHILS ABSOLUTE: 0.02 E9/L (ref 0–0.2)
BASOPHILS RELATIVE PERCENT: 0.8 % (ref 0–2)
BUN BLDV-MCNC: 20 MG/DL (ref 6–20)
CALCIUM SERPL-MCNC: 9.4 MG/DL (ref 8.6–10.2)
CHLORIDE BLD-SCNC: 103 MMOL/L (ref 98–107)
CO2: 36 MMOL/L (ref 22–29)
CREAT SERPL-MCNC: 0.3 MG/DL (ref 0.7–1.2)
DIGOXIN LEVEL: 0.7 NG/ML (ref 0.8–2)
EKG ATRIAL RATE: 101 BPM
EKG P AXIS: 72 DEGREES
EKG P-R INTERVAL: 134 MS
EKG Q-T INTERVAL: 374 MS
EKG QRS DURATION: 94 MS
EKG QTC CALCULATION (BAZETT): 484 MS
EKG R AXIS: 97 DEGREES
EKG T AXIS: 42 DEGREES
EKG VENTRICULAR RATE: 101 BPM
EOSINOPHILS ABSOLUTE: 0 E9/L (ref 0.05–0.5)
EOSINOPHILS RELATIVE PERCENT: 0 % (ref 0–6)
GFR AFRICAN AMERICAN: >60
GFR NON-AFRICAN AMERICAN: >60 ML/MIN/1.73
GLUCOSE BLD-MCNC: 125 MG/DL (ref 74–99)
HCT VFR BLD CALC: 34.2 % (ref 37–54)
HEMOGLOBIN: 10.8 G/DL (ref 12.5–16.5)
IMMATURE GRANULOCYTES #: 0.02 E9/L
IMMATURE GRANULOCYTES %: 0.8 % (ref 0–5)
LYMPHOCYTES ABSOLUTE: 0.17 E9/L (ref 1.5–4)
LYMPHOCYTES RELATIVE PERCENT: 6.9 % (ref 20–42)
MAGNESIUM: 2.4 MG/DL (ref 1.6–2.6)
MCH RBC QN AUTO: 28.5 PG (ref 26–35)
MCHC RBC AUTO-ENTMCNC: 31.6 % (ref 32–34.5)
MCV RBC AUTO: 90.2 FL (ref 80–99.9)
MONOCYTES ABSOLUTE: 0.06 E9/L (ref 0.1–0.95)
MONOCYTES RELATIVE PERCENT: 2.4 % (ref 2–12)
NEUTROPHILS ABSOLUTE: 2.18 E9/L (ref 1.8–7.3)
NEUTROPHILS RELATIVE PERCENT: 89.1 % (ref 43–80)
PDW BLD-RTO: 14.7 FL (ref 11.5–15)
PHOSPHORUS: 3.5 MG/DL (ref 2.5–4.5)
PLATELET # BLD: 106 E9/L (ref 130–450)
PMV BLD AUTO: 10.4 FL (ref 7–12)
POTASSIUM SERPL-SCNC: 3.7 MMOL/L (ref 3.5–5)
RBC # BLD: 3.79 E12/L (ref 3.8–5.8)
RBC # BLD: NORMAL 10*6/UL
REASON FOR REJECTION: NORMAL
REJECTED TEST: NORMAL
SODIUM BLD-SCNC: 146 MMOL/L (ref 132–146)
WBC # BLD: 2.5 E9/L (ref 4.5–11.5)

## 2021-07-01 PROCEDURE — 2060000000 HC ICU INTERMEDIATE R&B

## 2021-07-01 PROCEDURE — 36415 COLL VENOUS BLD VENIPUNCTURE: CPT

## 2021-07-01 PROCEDURE — 99232 SBSQ HOSP IP/OBS MODERATE 35: CPT | Performed by: INTERNAL MEDICINE

## 2021-07-01 PROCEDURE — 80048 BASIC METABOLIC PNL TOTAL CA: CPT

## 2021-07-01 PROCEDURE — 93010 ELECTROCARDIOGRAM REPORT: CPT | Performed by: INTERNAL MEDICINE

## 2021-07-01 PROCEDURE — 6360000002 HC RX W HCPCS: Performed by: INTERNAL MEDICINE

## 2021-07-01 PROCEDURE — 80162 ASSAY OF DIGOXIN TOTAL: CPT

## 2021-07-01 PROCEDURE — 6360000002 HC RX W HCPCS: Performed by: PHYSICIAN ASSISTANT

## 2021-07-01 PROCEDURE — 2500000003 HC RX 250 WO HCPCS: Performed by: STUDENT IN AN ORGANIZED HEALTH CARE EDUCATION/TRAINING PROGRAM

## 2021-07-01 PROCEDURE — 99233 SBSQ HOSP IP/OBS HIGH 50: CPT | Performed by: INTERNAL MEDICINE

## 2021-07-01 PROCEDURE — 92526 ORAL FUNCTION THERAPY: CPT | Performed by: SPEECH-LANGUAGE PATHOLOGIST

## 2021-07-01 PROCEDURE — 2580000003 HC RX 258: Performed by: STUDENT IN AN ORGANIZED HEALTH CARE EDUCATION/TRAINING PROGRAM

## 2021-07-01 PROCEDURE — 6360000002 HC RX W HCPCS: Performed by: STUDENT IN AN ORGANIZED HEALTH CARE EDUCATION/TRAINING PROGRAM

## 2021-07-01 PROCEDURE — 2500000003 HC RX 250 WO HCPCS: Performed by: INTERNAL MEDICINE

## 2021-07-01 PROCEDURE — 6370000000 HC RX 637 (ALT 250 FOR IP): Performed by: INTERNAL MEDICINE

## 2021-07-01 PROCEDURE — 99233 SBSQ HOSP IP/OBS HIGH 50: CPT | Performed by: STUDENT IN AN ORGANIZED HEALTH CARE EDUCATION/TRAINING PROGRAM

## 2021-07-01 PROCEDURE — 6370000000 HC RX 637 (ALT 250 FOR IP): Performed by: STUDENT IN AN ORGANIZED HEALTH CARE EDUCATION/TRAINING PROGRAM

## 2021-07-01 PROCEDURE — 2580000003 HC RX 258: Performed by: INTERNAL MEDICINE

## 2021-07-01 PROCEDURE — 94660 CPAP INITIATION&MGMT: CPT

## 2021-07-01 PROCEDURE — 85025 COMPLETE CBC W/AUTO DIFF WBC: CPT

## 2021-07-01 PROCEDURE — 2700000000 HC OXYGEN THERAPY PER DAY

## 2021-07-01 PROCEDURE — 83735 ASSAY OF MAGNESIUM: CPT

## 2021-07-01 PROCEDURE — 84100 ASSAY OF PHOSPHORUS: CPT

## 2021-07-01 RX ADMIN — FAMOTIDINE 20 MG: 20 TABLET ORAL at 21:32

## 2021-07-01 RX ADMIN — TBO-FILGRASTIM 300 MCG: 300 INJECTION, SOLUTION SUBCUTANEOUS at 14:39

## 2021-07-01 RX ADMIN — Medication 10 ML: at 21:36

## 2021-07-01 RX ADMIN — METOPROLOL TARTRATE 2.5 MG: 5 INJECTION INTRAVENOUS at 14:39

## 2021-07-01 RX ADMIN — AMIODARONE HYDROCHLORIDE 0.5 MG/MIN: 50 INJECTION, SOLUTION INTRAVENOUS at 23:59

## 2021-07-01 RX ADMIN — Medication 10 ML: at 09:18

## 2021-07-01 RX ADMIN — WATER 1000 MG: 1 INJECTION INTRAMUSCULAR; INTRAVENOUS; SUBCUTANEOUS at 09:20

## 2021-07-01 RX ADMIN — OLANZAPINE 5 MG: 5 TABLET, FILM COATED ORAL at 21:31

## 2021-07-01 RX ADMIN — METOPROLOL TARTRATE 2.5 MG: 5 INJECTION INTRAVENOUS at 10:37

## 2021-07-01 RX ADMIN — FAMOTIDINE 20 MG: 20 TABLET ORAL at 09:20

## 2021-07-01 RX ADMIN — METHYLPREDNISOLONE SODIUM SUCCINATE 40 MG: 40 INJECTION, POWDER, LYOPHILIZED, FOR SOLUTION INTRAMUSCULAR; INTRAVENOUS at 09:17

## 2021-07-01 RX ADMIN — DOXYCYCLINE 100 MG: 100 INJECTION, POWDER, LYOPHILIZED, FOR SOLUTION INTRAVENOUS at 14:39

## 2021-07-01 RX ADMIN — HYDROMORPHONE HYDROCHLORIDE 0.5 MG: 1 INJECTION, SOLUTION INTRAMUSCULAR; INTRAVENOUS; SUBCUTANEOUS at 01:43

## 2021-07-01 RX ADMIN — METOPROLOL TARTRATE 2.5 MG: 5 INJECTION INTRAVENOUS at 04:29

## 2021-07-01 RX ADMIN — DOXYCYCLINE 100 MG: 100 INJECTION, POWDER, LYOPHILIZED, FOR SOLUTION INTRAVENOUS at 04:28

## 2021-07-01 RX ADMIN — ENOXAPARIN SODIUM 50 MG: 60 INJECTION SUBCUTANEOUS at 21:32

## 2021-07-01 RX ADMIN — ENOXAPARIN SODIUM 50 MG: 60 INJECTION SUBCUTANEOUS at 09:19

## 2021-07-01 RX ADMIN — METOPROLOL TARTRATE 2.5 MG: 5 INJECTION INTRAVENOUS at 21:32

## 2021-07-01 ASSESSMENT — PAIN SCALES - GENERAL
PAINLEVEL_OUTOF10: 0
PAINLEVEL_OUTOF10: 6
PAINLEVEL_OUTOF10: 0

## 2021-07-01 NOTE — PROGRESS NOTES
SPEECH LANGUAGE PATHOLOGY  DAILY PROGRESS NOTE        PATIENT NAME:  Eladio Casey      :  1960          TODAY'S DATE:  2021 ROOM:  [address] [place] [address]        SWALLOWING     Pt completed laryngeal elevation exercises consisting of pitch glides. Pt unable to vocalize, but laryngeal elevation was observed during exercises. Pt was able to achieve aphonic speech and able to achieve appropriate laryngeal positioning to complete exercises as sequential repetitions. Pt required frequent suction between sets of exercises. Pt was able to complete 5-8 repetitions before requiring suctioning. Pt was able to complete 5 sets of exercises. Will continue SP intervention as per previously established POC. [facility]   Clinician  GURWINDER ABREU [facility] [place] CCC/SLP [id number]  Speech Pathologist      CPT code(s) 50962  dysphagia tx  Total minutes :  15 minutes

## 2021-07-01 NOTE — CARE COORDINATION
Pt wanting to go home per nursing. Awaiting palliative to see pt. again-full code at present. On Amiodarone drip. Requiring O2 12 liters high flow O2. Trickle tube feedings via gastrostomy tube. Sitter at bedside. [facility] and [facility] following. [facility](backdoor referral ) also following- would need insurance precert. Poor prognosis as per oncology note-recommending hospice care. Awaiting patient/ family discharge decision.  Will follow Varinder Edward RN case manager

## 2021-07-01 NOTE — PROGRESS NOTES
Pulmonary Progress Note for Jasmina    Admit Date: 2021  Hospital day                               PCP: Manju Chávez DO    Chief Complaint (s):  Patient Active Problem List   Diagnosis    Acute non-recurrent maxillary sinusitis    [facility] [address]    Acute on chronic respiratory failure with hypoxia ([facility]    Severe protein-calorie malnutrition (HCC)    Typical atrial flutter (HCC)    Pneumonia of left lower lobe due to infectious organism    Pleural effusion       Subjective:  · Awake and alert, still dyspneic. Discharge plans noted. Vitals:  VITALS:  /66   Pulse 79   Temp 97.9 °F (36.6 °C) (Oral)   Resp 24   Ht 5' 7\" (1.702 m)   Wt 97 lb 14.4 oz (44.4 kg)   SpO2 100%   BMI 15.33 kg/m²     24HR INTAKE/OUTPUT:      Intake/Output Summary (Last 24 hours) at 2021 1853  Last data filed at 2021 1516  Gross per 24 hour   Intake 285 ml   Output 650 ml   Net -365 ml       24HR PULSE OXIMETRY RANGE:    SpO2  Av %  Min: 72 %  Max: 100 %    Medications:  IV:   amiodarone 0.5 mg/min (21)    sodium chloride         Scheduled Meds:   enoxaparin  1 mg/kg Subcutaneous BID    metoprolol  2.5 mg Intravenous Q6H    OLANZapine  5 mg Oral Nightly    [Held by provider] metoprolol tartrate  25 mg Oral BID    methylPREDNISolone  40 mg Intravenous Daily    cefTRIAXone (ROCEPHIN) IV  1,000 mg Intravenous Q24H    doxycycline (VIBRAMYCIN) IV  100 mg Intravenous Q12H    sodium chloride flush  5-40 mL Intravenous 2 times per day    famotidine  20 mg Oral BID    [Held by provider] nicotine  1 patch Transdermal Daily       Diet:   Diet NPO     EXAM:  General: No distress. On BiPAP  Eyes: PERRL. No sclera icterus. No conjunctival injection. ENT: No discharge. Pharynx clear. Neck: Trachea midline. Normal thyroid. Resp: No accessory muscle use. Bibasilar rales. No wheezing. Few rhonchi. CV: Regular rate. Regular rhythm. No murmur or rub. Abd: Binder in place  Skin: Warm and dry. No nodule on exposed extremities. No rash on exposed extremities. Ext: No cyanosis, clubbing, edema  Lymph: No cervical LAD. No supraclavicular LAD. M/S: No cyanosis. No joint deformity. Significant clubbing. Neuro: Awake. Follows commands. Positive pupils/gag/corneals. Normal pain response. Results:  CBC:   Recent Labs     06/29/21  0331 06/30/21  0520 07/01/21  0420   WBC 1.1* 1.9* 2.5*   HGB 12.0* 11.0* 10.8*   HCT 37.3 33.9* 34.2*   MCV 88.6 89.2 90.2   PLT 76* 68* 106*     BMP:   Recent Labs     06/29/21  0331 06/30/21  0520 07/01/21  0549    142 146   K 4.1 3.5 3.7   CL 99 100 103   CO2 29 34* 36*   PHOS 2.8 3.1 3.5   BUN 23* 18 20   CREATININE 0.6* 0.4* 0.3*     LIVER PROFILE:   No results for input(s): AST, ALT, LIPASE, BILIDIR, BILITOT, ALKPHOS in the last 72 hours. Invalid input(s): AMYLASE,  ALB  PT/INR:   No results for input(s): PROTIME, INR in the last 72 hours. APTT: No results for input(s): APTT in the last 72 hours. Pathology:  1. N/A      Microbiology:  1. None    Recent ABG:   Recent Labs     06/30/21  1508   PH 7.348*   PO2 214.6*   PCO2 66.6*   HCO3 35.8*   BE 8.0*   O2SAT 99.5*   METHB 0.2   O2HB 98.9*   COHB 0.4   O2CON 17.6   HHB 0.5   THB 12.3     FiO2 : 100 %       Recent Films:  XR CHEST PORTABLE   Final Result   1. Persistent moderate large size right side pleural effusion with bi   basilar infiltrates. See report CT chest of June 28.      2.  Status post recent abdominal surgery, free intraperitoneal air observed. The      3. The right axillar region subcutaneous emphysema. CT ABDOMEN PELVIS W IV CONTRAST Additional Contrast? None   Final Result   Moderate amount of free air, improved from previous. Given history of recent   surgery, this is presumably postoperative. Bowel perforation thought less   likely.       Gastrostomy tube is within the stomach without extravasated contrast.      Partial visualization of right pleural effusion. Extensive consolidative opacities involving the right greater than left lower   lobe. Infiltrates from pneumonia should be considered. Continued follow-up   recommended. Minimal free fluid again identified in the abdomen/pelvis. Other findings are also similar to previous. CT HEAD WO CONTRAST   Final Result   No acute intracranial abnormality. XR ABDOMEN FOR NG/OG/NE TUBE PLACEMENT   Final Result   Extravasation of contrast following Gastrografin injection suggesting   abnormal position of gastrostomy tube. XR ABDOMEN FOR NG/OG/NE TUBE PLACEMENT   Final Result   Tip of the gastric tube in proper position. Recent postoperative changes as above commented. CTA CHEST W CONTRAST   Final Result   No evidence of pulmonary embolism. Again noted are multiple areas of consolidation and scattered patchy   opacities, progressed since the previous exam and is concerning for   multifocal airspace disease process. Moderate size right pleural effusion with pleural metastatic implants are   again noted. Large right perihilar mass with extension to the mediastinum is again noted. Significant mediastinal lymphadenopathy including supraclavicular lymph nodes   is again noted suggestive of disease extension. CT ABDOMEN PELVIS WO CONTRAST Additional Contrast? None   Final Result   Extensive postoperative free air in the abdomen. Air also identified in the anterior abdominal wall subcutaneous tissues. Consolidation in the bilateral lung bases may be secondary to atelectasis   postoperative versus superimposed pneumonia in the proper clinical setting. Partially imaged right-sided pleural effusion. FL MODIFIED BARIUM SWALLOW W VIDEO   Final Result   Positive aspiration to thin and nectar consistencies of barium contrast were.       Please see separate speech pathology report for full discussion of findings   and recommendations. US THORACENTESIS Which side should the procedure be performed? Right   Final Result   Successful ultrasound guided thoracentesis. XR CHEST PORTABLE   Final Result   Right-sided chest port in good position. No pneumothorax. Moderate right-sided pleural effusions unchanged. FLUORO FOR SURGICAL PROCEDURES   Final Result   Intraprocedural fluoroscopic spot images as above. See separate procedure   report for more information. XR CHEST PORTABLE   Final Result   Stable abnormal chest with the mediastinal and hilar adenopathy with soft   tissue mass in the right hilum concerning for malignancy. Persistent bibasilar infiltrates and pleural effusion likely pneumonia or   edema. CTA CHEST W CONTRAST   Final Result   1. There is no evidence of a pulmonary embolus. 2. Moderate size right pleural effusion   3. Advanced emphysematous changes with superimposed pneumonia seen within the   left lower lobe and within the lingula. This finding is new when compared   with the previous CT scan of 06/11/2021 and PET-CT scan of 06/16/2021.   4. Large right perihilar mass with extension into the mediastinum consistent   with the history of metastatic small cell lung CA   5. Significant right hilar, mediastinal core right paratracheal and supra and   infraclavicular lymphadenopathy. 6. Pleural metastasis. Chronically distended esophagus secondary to the   metastatic lymphadenopathy within the inferior posterior mediastinum. XR CHEST PORTABLE   Final Result   No significant change             Assessment:  1. Large right pleural effusion: Post thoracentesis  2. Aspiration pneumonia, left side. It is unclear why a swallowing evaluation was done with the patient clearly has no esophageal lumen. 3. Esophageal occlusion: Secondary to likely malignant mediastinal adenopathy with external compression as seen above  4.  Confusion: Worrisome for brain metastasis  5. Abnormal placed in G-tube: It was replaced after the patient pulled his original tube out despite it being well sutured in place per the operative note. 6. Small cell carcinoma of the lung    Plan:  1. Agree with discharge to hospice. Time at the bedside, reviewing labs and radiographs, reviewing updated notes and consultations, discussing with staff and family was more than 45 minutes. Please note that voice recognition technology was used in the preparation of this note and make therefore it may contain inadvertent transcription errors. If the patient is a COVID 19 isolation patient, the above physical exam reflects that of the examining physician for the day. Samson Stapleton MD,  M.D., F.C.C.P.     [facility], [address] [place] [address], [address] [place] [address], [facility]

## 2021-07-01 NOTE — PROGRESS NOTES
Pulmonary Progress Note for Jasmina    Admit Date: 2021  Hospital day                               PCP: Mg Perez DO    Chief Complaint (s):  Patient Active Problem List   Diagnosis    Acute non-recurrent maxillary sinusitis    Small cell lung carcinoma ([address]    Acute on chronic respiratory failure with hypoxia ([address]    Severe protein-calorie malnutrition ([address]    Typical atrial flutter (HCC)    Pneumonia of left lower lobe due to infectious organism    Pleural effusion       Subjective:  Shelia Clements is awake and alert and appears more comfortable. Vitals:  VITALS:  /69   Pulse 97   Temp 97.8 °F (36.6 °C) (Oral)   Resp 26   Ht 5' 7\" (1.702 m)   Wt 97 lb 14.4 oz (44.4 kg)   SpO2 98% Comment: BiPAP  BMI 15.33 kg/m²     24HR INTAKE/OUTPUT:      Intake/Output Summary (Last 24 hours) at 2021 1418  Last data filed at 2021  Gross per 24 hour   Intake 100 ml   Output 200 ml   Net -100 ml       24HR PULSE OXIMETRY RANGE:    SpO2  Av %  Min: 72 %  Max: 100 %    Medications:  IV:   amiodarone 0.5 mg/min (21)    sodium chloride         Scheduled Meds:   tbo-filgrastim  300 mcg Subcutaneous Daily    enoxaparin  1 mg/kg Subcutaneous BID    metoprolol  2.5 mg Intravenous Q6H    OLANZapine  5 mg Oral Nightly    [Held by provider] metoprolol tartrate  25 mg Oral BID    methylPREDNISolone  40 mg Intravenous Daily    cefTRIAXone (ROCEPHIN) IV  1,000 mg Intravenous Q24H    doxycycline (VIBRAMYCIN) IV  100 mg Intravenous Q12H    sodium chloride flush  5-40 mL Intravenous 2 times per day    famotidine  20 mg Oral BID    [Held by provider] nicotine  1 patch Transdermal Daily       Diet:   Diet NPO     EXAM:  General: No distress. On BiPAP  Eyes: PERRL. No sclera icterus. No conjunctival injection. ENT: No discharge. Pharynx clear. Neck: Trachea midline. Normal thyroid. Resp: No accessory muscle use. Bibasilar rales. No wheezing.   Few rhonchi. CV: Regular rate. Regular rhythm. No murmur or rub. Abd: Binder in place  Skin: Warm and dry. No nodule on exposed extremities. No rash on exposed extremities. Ext: No cyanosis, clubbing, edema  Lymph: No cervical LAD. No supraclavicular LAD. M/S: No cyanosis. No joint deformity. Significant clubbing. Neuro: Awake. Follows commands. Positive pupils/gag/corneals. Normal pain response. Results:  CBC:   Recent Labs     06/29/21  0331 06/30/21  0520 07/01/21  0420   WBC 1.1* 1.9* 2.5*   HGB 12.0* 11.0* 10.8*   HCT 37.3 33.9* 34.2*   MCV 88.6 89.2 90.2   PLT 76* 68* 106*     BMP:   Recent Labs     06/29/21  0331 06/30/21  0520 07/01/21  0549    142 146   K 4.1 3.5 3.7   CL 99 100 103   CO2 29 34* 36*   PHOS 2.8 3.1 3.5   BUN 23* 18 20   CREATININE 0.6* 0.4* 0.3*     LIVER PROFILE:   No results for input(s): AST, ALT, LIPASE, BILIDIR, BILITOT, ALKPHOS in the last 72 hours. Invalid input(s): AMYLASE,  ALB  PT/INR:   No results for input(s): PROTIME, INR in the last 72 hours. APTT: No results for input(s): APTT in the last 72 hours. Pathology:  1. N/A      Microbiology:  1. None    Recent ABG:   Recent Labs     06/30/21  1508   PH 7.348*   PO2 214.6*   PCO2 66.6*   HCO3 35.8*   BE 8.0*   O2SAT 99.5*   METHB 0.2   O2HB 98.9*   COHB 0.4   O2CON 17.6   HHB 0.5   THB 12.3     FiO2 : 100 %       Recent Films:  XR CHEST PORTABLE   Final Result   1. Persistent moderate large size right side pleural effusion with bi   basilar infiltrates. See report CT chest of June 28.      2.  Status post recent abdominal surgery, free intraperitoneal air observed. The      3. The right axillar region subcutaneous emphysema. CT ABDOMEN PELVIS W IV CONTRAST Additional Contrast? None   Final Result   Moderate amount of free air, improved from previous. Given history of recent   surgery, this is presumably postoperative. Bowel perforation thought less   likely.       Gastrostomy tube is within the stomach without extravasated contrast.      Partial visualization of right pleural effusion. Extensive consolidative opacities involving the right greater than left lower   lobe. Infiltrates from pneumonia should be considered. Continued follow-up   recommended. Minimal free fluid again identified in the abdomen/pelvis. Other findings are also similar to previous. CT HEAD WO CONTRAST   Final Result   No acute intracranial abnormality. XR ABDOMEN FOR NG/OG/NE TUBE PLACEMENT   Final Result   Extravasation of contrast following Gastrografin injection suggesting   abnormal position of gastrostomy tube. XR ABDOMEN FOR NG/OG/NE TUBE PLACEMENT   Final Result   Tip of the gastric tube in proper position. Recent postoperative changes as above commented. CTA CHEST W CONTRAST   Final Result   No evidence of pulmonary embolism. Again noted are multiple areas of consolidation and scattered patchy   opacities, progressed since the previous exam and is concerning for   multifocal airspace disease process. Moderate size right pleural effusion with pleural metastatic implants are   again noted. Large right perihilar mass with extension to the mediastinum is again noted. Significant mediastinal lymphadenopathy including supraclavicular lymph nodes   is again noted suggestive of disease extension. CT ABDOMEN PELVIS WO CONTRAST Additional Contrast? None   Final Result   Extensive postoperative free air in the abdomen. Air also identified in the anterior abdominal wall subcutaneous tissues. Consolidation in the bilateral lung bases may be secondary to atelectasis   postoperative versus superimposed pneumonia in the proper clinical setting. Partially imaged right-sided pleural effusion. FL MODIFIED BARIUM SWALLOW W VIDEO   Final Result   Positive aspiration to thin and nectar consistencies of barium contrast were.       Please see separate speech pathology report for full discussion of findings   and recommendations. US THORACENTESIS Which side should the procedure be performed? Right   Final Result   Successful ultrasound guided thoracentesis. XR CHEST PORTABLE   Final Result   Right-sided chest port in good position. No pneumothorax. Moderate right-sided pleural effusions unchanged. FLUORO FOR SURGICAL PROCEDURES   Final Result   Intraprocedural fluoroscopic spot images as above. See separate procedure   report for more information. XR CHEST PORTABLE   Final Result   Stable abnormal chest with the mediastinal and hilar adenopathy with soft   tissue mass in the right hilum concerning for malignancy. Persistent bibasilar infiltrates and pleural effusion likely pneumonia or   edema. CTA CHEST W CONTRAST   Final Result   1. There is no evidence of a pulmonary embolus. 2. Moderate size right pleural effusion   3. Advanced emphysematous changes with superimposed pneumonia seen within the   left lower lobe and within the lingula. This finding is new when compared   with the previous CT scan of 06/11/2021 and PET-CT scan of 06/16/2021.   4. Large right perihilar mass with extension into the mediastinum consistent   with the history of metastatic small cell lung CA   5. Significant right hilar, mediastinal core right paratracheal and supra and   infraclavicular lymphadenopathy. 6. Pleural metastasis. Chronically distended esophagus secondary to the   metastatic lymphadenopathy within the inferior posterior mediastinum. XR CHEST PORTABLE   Final Result   No significant change             Assessment:  1. Large right pleural effusion: Post thoracentesis  2. Aspiration pneumonia, left side. It is unclear why a swallowing evaluation was done with the patient clearly has no esophageal lumen.   3. Esophageal occlusion: Secondary to likely malignant mediastinal adenopathy with external compression as seen above  4. Confusion: Worrisome for brain metastasis  5. Abnormal placed in G-tube: It was replaced after the patient pulled his original tube out despite it being well sutured in place per the operative note. 6. Small cell carcinoma of the lung    Plan:  1. Overall prognosis is grim. Hospice care is in order. Time at the bedside, reviewing labs and radiographs, reviewing updated notes and consultations, discussing with staff and family was more than 45 minutes. Please note that voice recognition technology was used in the preparation of this note and make therefore it may contain inadvertent transcription errors. If the patient is a COVID 19 isolation patient, the above physical exam reflects that of the examining physician for the day. Denzel May MD,  M.D., F.C.C.P.     [facility], [address] [place] [address], [address] [place] [address], [facility]

## 2021-07-01 NOTE — PROGRESS NOTES
peace.    OBJECTIVE:   Prognosis: Poor    Physical Exam:  /69   Pulse 97   Temp 97.8 °F (36.6 °C) (Axillary)   Resp 26   Ht 5' 7\" (1.702 m)   Wt 97 lb 14.4 oz (44.4 kg)   SpO2 98% Comment: BiPAP  BMI 15.33 kg/m²   Gen:  Elderly, cachectic, NAD, awake, alert  HEENT:  Normocephalic, atraumatic, mucosa dry, left eye with disconjugate gaze, EOMI, sclera anicteric  Neck:  Trachea midline  Lungs: BiPAP, congested cough, rhonchi, respirations unlabored  Heart[de-identified]  RRR, distant heart tones  Abd:  Soft, non tender, non distended, bowel sounds present  : Deferred  Ext: Sarcopenia, moving all extremities, no edema, pulses present, clubbing x10 fingers  Skin:  Warm and dry without rash, bruising, petechiae  Neuro:  Alert, oriented x 3; following commands    Objective data reviewed: labs, images, records, medication use, vitals and chart      Time/Communication  Greater than 50% of time spent, total 35 minutes in counseling and coordination of care at the bedside/over the telephone regarding goals of care, symptom management, diagnosis and prognosis and see above. Thank you for allowing Palliative Medicine to participate in the care of Sari Hodge. Provider Muostapha Mclaughlin MD PA-C  Palliative Medicine    Note: This report was completed using computerScramblerMail voiced recognition software. Every effort has been made to ensure accuracy; however, inadvertent computerized transcription errors may be present.

## 2021-07-01 NOTE — CARE COORDINATION
Hospice consult noted. Phone conversation w/ daughter Stephanie Khan [phone] offered and declined- agreeable to [facility]-requesting [facility] to call her as well as talk to her father. Plan is probable home w/ hospice.  Kathleen @ [facility] notified of referral. Janelle Benavides, RN case manager

## 2021-07-01 NOTE — PROGRESS NOTES
Patient daughter called on patient's phone. Daughter requested to speak with patient and this RN to determine if patient wanted to go home with hospice or remain in hospital.  Patient has written a note to family on this subject. Note was read to daughter in presence of patient. Also states verbally at this time that he wants to [facility] home\"     Note states: \"For my kids //  --------------------------    Got to see if //  I can let my kids //   will let die at home //  If I am too die I //  Want to be home pet my //  Pup.   Want to die at home //  Eat drink smoke //  Pet pup and smoke //    Don't be upset //  Live and let live //  Then live and let die //  Love you all //  ------------------------------  Dad\"    Daughter verbalized understanding and stated that they will bring him home with a family caregiver to assist.

## 2021-07-01 NOTE — PROGRESS NOTES
INPATIENT CARDIOLOGY FOLLOW-UP    Name: Ajay Murphy    Age: 62 y.o. Date of Admission: 2021  6:54 AM    Date of Service: 2021    Primary Cardiologist: Known to me from this admission    Chief Complaint: Follow-up for new onset atrial flutter    Interim History:  Yesterday afternoon patient maintaining atrial flutter RVR 2: 1 conduction and became hypotensive. Thus decision was made to initiate IV amiodarone. However prior to initiation he converted to sinus rhythm. I elected to proceed with amiodarone loading after spontaneous conversion to sinus, to help maintain sinus rhythm. This morning he remains in sinus rhythm nineties    Overnight it seems patient made decision to transition to comfort care, he wants to go home and be with his children. Son is at the bedside this morning. Patient speech remains very difficult to understand, but he was very awake and alert today.     Review of Systems:   Unable to obtain in detail this morning    Problem List:  Patient Active Problem List   Diagnosis    Acute non-recurrent maxillary sinusitis    [facility] cell [facility] [address]    Acute on chronic respiratory failure with hypoxia (HCC)    Severe protein-calorie malnutrition (HCC)    Typical atrial flutter (HCC)    Pneumonia of left lower lobe due to infectious organism    Pleural effusion       Current Medications:    Current Facility-Administered Medications:     [COMPLETED] amiodarone (CORDARONE) 150 mg in dextrose 5 % 100 mL bolus, 150 mg, Intravenous, Once, Stopped at 21 **FOLLOWED BY** [] amiodarone (CORDARONE) 450 mg in dextrose 5 % 250 mL infusion, 1 mg/min, Intravenous, Continuous, Last Rate: 33.3 mL/hr at 21, 1 mg/min at 21 **FOLLOWED BY** amiodarone (CORDARONE) 450 mg in dextrose 5 % 250 mL infusion, 0.5 mg/min, Intravenous, Continuous, Royal Lynne MD, Last Rate: 16.7 mL/hr at 21, 0.5 mg/min at 21    HYDROmorphone (DILAUDID) injection 0.5 mg, 0.5 mg, Intravenous, Q3H PRN, 0.5 mg at 07/01/21 0143 **OR** [DISCONTINUED] HYDROmorphone (DILAUDID) injection 1 mg, 1 mg, Intravenous, Q3H PRN, Jina Ortiz MD, 1 mg at 06/30/21 0449    oxyCODONE (ROXICODONE) 5 MG/5ML solution 5 mg, 5 mg, Oral, Q4H PRN **OR** oxyCODONE (ROXICODONE) 5 MG/5ML solution 10 mg, 10 mg, Oral, Q4H PRN, WANDY Coronel    Tbo-Filgrastim [facility]) injection 300 mcg, 300 mcg, Subcutaneous, Daily, Kristi Diaz MD, 300 mcg at 06/30/21 1143    dilTIAZem injection 20 mg, 20 mg, Intravenous, PRN, Wendy Teran MD, 20 mg at 06/30/21 0551    dilTIAZem 100 mg in dextrose 5 % 100 mL infusion ([place]), 10 mg/hr, Intravenous, PRN, Wendy Teran MD    enoxaparin (LOVENOX) injection 50 mg, 1 mg/kg, Subcutaneous, BID, Marielle Pitt MD, 50 mg at 06/30/21 2230    metoprolol (LOPRESSOR) injection 2.5 mg, 2.5 mg, Intravenous, Q6H, Maco Betancourt MD, 2.5 mg at 07/01/21 0429    OLANZapine (ZYPREXA) tablet 5 mg, 5 mg, Oral, Nightly, Jazmín Longoria MD, 5 mg at 06/30/21 2229    [Held by provider] metoprolol tartrate (LOPRESSOR) tablet 25 mg, 25 mg, Oral, BID, Yadiel Espinosa MD    levalbuterol Horacio Clark) nebulization 0.63 mg, 0.63 mg, Nebulization, Q4H PRN, Anthony Casey MD, 0.63 mg at 06/27/21 1606    perflutren lipid microspheres (DEFINITY) injection 1.65 mg, 1.5 mL, Intravenous, ONCE PRN, KAVYA Griffiths    naloxone [facility]) injection 0.4 mg, 0.4 mg, Intravenous, PRN, Anthony Casey MD, 0.4 mg at 06/30/21 0824    methylPREDNISolone sodium (SOLU-MEDROL) injection 40 mg, 40 mg, Intravenous, Daily, Jina Ortiz MD, 40 mg at 06/30/21 1037    guaiFENesin tablet 400 mg, 400 mg, Oral, 4x Daily PRN, Jina Ortiz MD, 400 mg at 06/28/21 1137    cefTRIAXone (ROCEPHIN) 1,000 mg in sterile water 10 mL IV syringe, 1,000 mg, Intravenous, Q24H, Jina Ortiz MD, 1,000 mg at 06/30/21 1037    doxycycline (VIBRAMYCIN) 100 mg in dextrose 5 % 100 mL IVPB, 100 mg, Intravenous, Q12H, Ranjan Johnson MD, Last Rate: 100 mL/hr at 07/01/21 0428, 100 mg at 07/01/21 0428    sodium chloride flush 0.9 % injection 5-40 mL, 5-40 mL, Intravenous, 2 times per day, Ranjan Johnson MD, 10 mL at 06/30/21 2230    sodium chloride flush 0.9 % injection 5-40 mL, 5-40 mL, Intravenous, PRN, Ranjan Johnson MD, 10 mL at 06/29/21 1507    0.9 % sodium chloride infusion, 25 mL, Intravenous, PRN, Ranjan Johnson MD    ondansetron (ZOFRAN-ODT) disintegrating tablet 4 mg, 4 mg, Oral, Q8H PRN, 4 mg at 06/20/21 0859 **OR** ondansetron (ZOFRAN) injection 4 mg, 4 mg, Intravenous, Q6H PRN, Ranjan Johnson MD    polyethylene glycol [facility]) packet 17 g, 17 g, Oral, Daily PRN, Ranjan Johnson MD    acetaminophen (TYLENOL) tablet 650 mg, 650 mg, Oral, Q6H PRN **OR** acetaminophen (TYLENOL) suppository 650 mg, 650 mg, Rectal, Q6H PRN, Ranjan Johnson MD    famotidine (PEPCID) tablet 20 mg, 20 mg, Oral, BID, Ranjan Johnson MD, 20 mg at 06/30/21 2229    [Held by provider] nicotine (NICODERM CQ) 21 MG/24HR 1 patch, 1 patch, Transdermal, Daily, Ranjan Johnson MD, 1 patch at 06/25/21 1727    Physical Exam:  /72   Pulse 88   Temp 97.8 °F (36.6 °C) (Axillary)   Resp 28   Ht 5' 7\" (1.702 m)   Wt 97 lb 14.4 oz (44.4 kg)   SpO2 100%   BMI 15.33 kg/m²   Wt Readings from Last 3 Encounters:   07/01/21 97 lb 14.4 oz (44.4 kg)   06/16/21 104 lb 11.2 oz (47.5 kg)   06/14/21 106 lb 3.2 oz (48.2 kg)     Appearance: Ill-appearing gentleman, cachectic. Awake and alert, speech extremely difficult to understand  Skin: Intact, no rash  Head: Normocephalic, atraumatic  Eyes: EOMI, no conjunctival erythema  ENMT: No pharyngeal erythema, MMM, no rhinorrhea  Neck: Supple, no elevated JVP, no carotid bruits  Lungs: Rhonchi everywhere  Cardiac: Regular rhythm normal rate.   Difficult to hear heart tones because the noisy breath sounds  Abdomen: G tube/abdominal binder  Extremities: rhythm    Telemetry: Atrial flutter -> sinus rhythm nineties    Chest X-ray:   6/22/21    Impression   Right-sided chest port in good position.       No pneumothorax.       Moderate right-sided pleural effusions unchanged. Echocardiogram: pending    Stress test:      Cardiac catheterization:     ----------------------------------------------------------------------------------------------------------------------------------------------------------------  IMPRESSION:  1. New onset atrial flutter, onset 6/25/2021. In and out of flutter. Now maintaining sinus, IV amiodarone started June 30, 2021  2. Elevated digoxin level. Greater than 10. Stat repeat 1.6 ng/mL. Thus likely lab error. No evidence of cardiac bradycardia arrhythmias attributable to digoxin toxicity  3. Acute hypoxic and hypercapnic respiratory failure. PCO2 80s with a pH of 7.23  4. Aspiration pneumonia  5. Pleural effusion  6. Recently diagnosed small cell lung cancer; metastasis to esophagus with distal esophageal compression  7. COPD  8. Long history of tobacco abuse  9. Status post open gastrostomy tube  10. Pancytopenia with recent drop in platelets 68  11. Severe malnutrition    RECOMMENDATIONS:  Patient seems to have come to  with the unfortunate gravity of the situation. He wants to go home to be with family. · Discussed with charge nurse, please notify palliative care so arrangements can be made to transition home with hospice  · Continue amiodarone for now, until switches to comfort care only  · Emotional support provided to patient and his son    Luis Enrique Bety FUENTES, [facility]    NOTE: This report was transcribed using voice recognition software. Every effort was made to ensure accuracy; however, inadvertent computerized transcription errors may be present.

## 2021-07-01 NOTE — PROGRESS NOTES
Inpatient Medical Oncology Progress Note    Subjective:  SOB. Going home with hospice    Objective:  /66   Pulse 79   Temp 97.9 °F (36.6 °C) (Oral)   Resp 24   Ht 5' 7\" (1.702 m)   Wt 97 lb 14.4 oz (44.4 kg)   SpO2 100%   BMI 15.33 kg/m²   GENERAL: confused, tired  HEENT: PERRLA; EOMI. Oropharynx dry  NECK: Supple. Without lymphadenopathy. LUNGS: scattered wheezing tangela  CARDIOVASCULAR: Tachycardic. ABDOMEN: Soft. Non-tender, non-distended. PEG tube  EXTREMITIES: Without clubbing, cyanosis, or edema.    ECOG PS 2-3    Diagnostics:  Lab Results   Component Value Date    WBC 2.5 (L) 07/01/2021    HGB 10.8 (L) 07/01/2021    HCT 34.2 (L) 07/01/2021    MCV 90.2 07/01/2021     (L) 07/01/2021     Lab Results   Component Value Date     07/01/2021    K 3.7 07/01/2021     07/01/2021    CO2 36 (H) 07/01/2021    BUN 20 07/01/2021    CREATININE 0.3 (L) 07/01/2021    GLUCOSE 125 (H) 07/01/2021    CALCIUM 9.4 07/01/2021    PROT 6.5 06/25/2021    LABALBU 2.9 (L) 06/25/2021    BILITOT 0.5 06/25/2021    ALKPHOS 62 06/25/2021    AST 17 06/25/2021    ALT 8 06/25/2021    LABGLOM >60 07/01/2021    GFRAA >60 07/01/2021     Impression/Plan:  62 y.o male with Extensive SCLC     PET/CT on 06/15/2021 Mass centered in the right hilar region extending into the mediastinum consistent with history of small cell lung cancer with metabolically active metastatic mediastinal lymphadenopathy extending into the bilateral supraclavicular region and inferiorly into the retrocrural region.   Metabolically active right pleural metastases with corresponding pleural effusion  Metabolically active nodules in the skin of the abdomen are concerning for metastatic disease.   The esophagus is distended which appears to be secondary to compression by lower mediastinal lymphadenopathy.   Intense activity in the collapsed right lower lobe could be due to additional malignancy or postobstructive pneumonia.      Extensive SCLC, Recommended systemic therapy consisting of Carboplatin + Etoposide + Tecentriq. Side effects reviewed. He agreed to proceed.   Cycle # 1 Carboplatin + Etoposide + Tecentriq was on 06/16/2021  He had presented to the hospital on 06/18/2021 for worsening shortness of breath. CTA chest noted no PE. Moderate right pleural effusion  Advanced emphysematous changes with superimposed pneumonia seen within the with the previous CT scan of 06/11/2021 and PET-CT scan of 06/16/2021  Large right perihilar mass with extension into the mediastinum  Significant right hilar mediastinal core right paratracheal and supra and infraclavicular LN  Pleural metastasis. Chronically distended esophagus   The patient was admitted with acute COPD exacerbation, possible postobstructive pneumonia  The patient is doing better clinically. Pulmonary team on board  Today's bronchoscopy was canceled because of atrial fibrillation and ST segment changes as well as oxygen needs  IR guided right thoracentesis 06/22/2021; A total of 1200 cc of pleural effusion was removed  Continue IV steroids, nebs. Continue antibiotics  Continue to monitor his CBCD. Right subclavian Mediport 06/22/2021  Speech on board, failed swallow study. EGD was attempted 6/24 for PEG placement but extrinsic compression on the esophagus prevented the scope advancement. Open gastrostomy tube 06/25/2021. General surgery following. Ok to use G tube  Bronchoscopy for pulmonary toilet again has been canceled due to the patient's tenuous status  Overall very poor prognosis. Hospice care recommended.  Hospice team on board  Going home with Hospice    07/01/2021  Lupe Calderon MD

## 2021-07-01 NOTE — PROGRESS NOTES
Liliya You Hospitalist   Progress Note    Admitting Date and Time: 6/18/2021  6:54 AM  Admit Dx: Acute on chronic respiratory failure with hypoxia (HCC) [J96.21]    Subjective: Admitted on 18th with shortness of breath, patient with small cell lung cancer, still smokes, admitted with acute on chronic respiratory failure with hypoxia. As per oncology extensive small cell lung cancer, systemic therapy with carboplatin plus etoposide plus Tecentriq. Patient with moderate right pleural effusion. Also large perihilar mass. Covid negative. Pulmonology continued IV steroids and IV antibiotics. Left-sided aspiration pneumonia. Right-sided large malignant effusion. Patient had placement of right subclavian Mediport by surgery. Patient with dysphagia, likely secondary to mediastinal lymphadenopathy. Patient had a PET/CT on 15th which had shown mass centered in the right hilar region, metabolically active metastatic mediastinal lymphadenopathy, also in the supraclavicular region, also in pleural effusion, also in the lower mediastinal region compressing esophagus. Speech recommended n.p.o.  Recommended alternative source for nutrition. Patient had IR guided thoracentesis on 22nd with removal of 1.2 L. Patient did undergo open gastrostomy tube on 25th. Did go to ICU postop is hypotensive. Seen by cardiology due to persistent tachycardia, new onset A. fib, loaded with IV dig, not a good candidate for DC cardioversion. Surgery okay to start feedings as Gastrografin study confirmed the placement. Patient with agitation, pulled out PEG on 27th.  G-tube was replaced by surgery. Okay to use. Patient with new A. fib. AM PAC of 14/24. Patient does remain full code with poor prognosis. Cardiology recommending hospice. On amiodarone drip. Patient was admitted with Acute on chronic respiratory failure with hypoxia ([facility] [address] [J96.21].  Patient is awake, alert, does obey commands, limited communication, does say that he has not spoken to hospice, also says he knows what hospice is. Patient was also updated at that cancer has aspirated. Per RN: Patient still confused. ROS: denies fever, chills, cp, sob, n/v, HA unless stated above.      tbo-filgrastim  300 mcg Subcutaneous Daily    enoxaparin  1 mg/kg Subcutaneous BID    metoprolol  2.5 mg Intravenous Q6H    OLANZapine  5 mg Oral Nightly    [Held by provider] metoprolol tartrate  25 mg Oral BID    methylPREDNISolone  40 mg Intravenous Daily    cefTRIAXone (ROCEPHIN) IV  1,000 mg Intravenous Q24H    doxycycline (VIBRAMYCIN) IV  100 mg Intravenous Q12H    sodium chloride flush  5-40 mL Intravenous 2 times per day    famotidine  20 mg Oral BID    [Held by provider] nicotine  1 patch Transdermal Daily     HYDROmorphone, 0.5 mg, Q3H PRN  oxyCODONE, 5 mg, Q4H PRN   Or  oxyCODONE, 10 mg, Q4H PRN  dilTIAZem, 20 mg, PRN  dilTIAZem, 10 mg/hr, PRN  levalbuterol, 0.63 mg, Q4H PRN  perflutren lipid microspheres, 1.5 mL, ONCE PRN  naloxone, 0.4 mg, PRN  guaiFENesin, 400 mg, 4x Daily PRN  sodium chloride flush, 5-40 mL, PRN  sodium chloride, 25 mL, PRN  ondansetron, 4 mg, Q8H PRN   Or  ondansetron, 4 mg, Q6H PRN  polyethylene glycol, 17 g, Daily PRN  acetaminophen, 650 mg, Q6H PRN   Or  acetaminophen, 650 mg, Q6H PRN         Objective:    /69   Pulse 97   Temp 97.8 °F (36.6 °C) (Axillary)   Resp 26   Ht 5' 7\" (1.702 m)   Wt 97 lb 14.4 oz (44.4 kg)   SpO2 98% Comment: BiPAP  BMI 15.33 kg/m²   General Appearance: alert and oriented to person, place and time, well-developed and well-nourished, in no acute distress  Skin: warm and dry, no rash or erythema  Head: normocephalic and atraumatic  Eyes: pupils equal, round, and reactive to light, extraocular eye movements intact, conjunctivae normal  ENT: tympanic membrane, external ear and ear canal normal bilaterally, oropharynx clear and moist with normal mucous membranes  Neck: neck supple and non tender without mass, no thyromegaly or thyroid nodules, no cervical lymphadenopathy   Pulmonary/Chest: clear to auscultation bilaterally- no wheezes, rales or rhonchi, normal air movement, no respiratory distress  Cardiovascular: normal rate, normal S1 and S2, no gallops, intact distal pulses and no carotid bruits  Abdomen: soft, non-tender, non-distended, normal bowel sounds, no masses or organomegaly  Is put in the right side of her chest.  Does have a G-tube. Recent Labs     06/29/21  0331 06/30/21  0520 07/01/21  0549    142 146   K 4.1 3.5 3.7   CL 99 100 103   CO2 29 34* 36*   BUN 23* 18 20   CREATININE 0.6* 0.4* 0.3*   GLUCOSE 100* 87 125*   CALCIUM 8.9 9.5 9.4       Recent Labs     06/29/21 0331 06/30/21  0520 07/01/21  0420   WBC 1.1* 1.9* 2.5*   RBC 4.21 3.80 3.79*   HGB 12.0* 11.0* 10.8*   HCT 37.3 33.9* 34.2*   MCV 88.6 89.2 90.2   MCH 28.5 28.9 28.5   MCHC 32.2 32.4 31.6*   RDW 14.1 14.3 14.7   PLT 76* 68* 106*   MPV 9.0 9.0 10.4         Radiology:   XR CHEST PORTABLE   Final Result   1. Persistent moderate large size right side pleural effusion with bi   basilar infiltrates. See report CT chest of June 28.      2.  Status post recent abdominal surgery, free intraperitoneal air observed. The      3. The right axillar region subcutaneous emphysema. CT ABDOMEN PELVIS W IV CONTRAST Additional Contrast? None   Final Result   Moderate amount of free air, improved from previous. Given history of recent   surgery, this is presumably postoperative. Bowel perforation thought less   likely. Gastrostomy tube is within the stomach without extravasated contrast.      Partial visualization of right pleural effusion. Extensive consolidative opacities involving the right greater than left lower   lobe. Infiltrates from pneumonia should be considered. Continued follow-up   recommended. Minimal free fluid again identified in the abdomen/pelvis.       Other findings are also similar to previous. CT HEAD WO CONTRAST   Final Result   No acute intracranial abnormality. XR ABDOMEN FOR NG/OG/NE TUBE PLACEMENT   Final Result   Extravasation of contrast following Gastrografin injection suggesting   abnormal position of gastrostomy tube. XR ABDOMEN FOR NG/OG/NE TUBE PLACEMENT   Final Result   Tip of the gastric tube in proper position. Recent postoperative changes as above commented. CTA CHEST W CONTRAST   Final Result   No evidence of pulmonary embolism. Again noted are multiple areas of consolidation and scattered patchy   opacities, progressed since the previous exam and is concerning for   multifocal airspace disease process. Moderate size right pleural effusion with pleural metastatic implants are   again noted. Large right perihilar mass with extension to the mediastinum is again noted. Significant mediastinal lymphadenopathy including supraclavicular lymph nodes   is again noted suggestive of disease extension. CT ABDOMEN PELVIS WO CONTRAST Additional Contrast? None   Final Result   Extensive postoperative free air in the abdomen. Air also identified in the anterior abdominal wall subcutaneous tissues. Consolidation in the bilateral lung bases may be secondary to atelectasis   postoperative versus superimposed pneumonia in the proper clinical setting. Partially imaged right-sided pleural effusion. FL MODIFIED BARIUM SWALLOW W VIDEO   Final Result   Positive aspiration to thin and nectar consistencies of barium contrast were. Please see separate speech pathology report for full discussion of findings   and recommendations. US THORACENTESIS Which side should the procedure be performed? Right   Final Result   Successful ultrasound guided thoracentesis. XR CHEST PORTABLE   Final Result   Right-sided chest port in good position. No pneumothorax.       Moderate right-sided change. 4. Unfortunately worsening metastatic disease, also leading to dysphagia, now patient with G-tube. Unfortunately prognosis not favorable, patient seen by palliative care, recommendations of hospice by multiple subspecialty, hospice involved came to see the patient. So far no real communication with the patient by hospice.         Electronically signed by Jesse Enriquez MD on 7/1/2021 at 12:31 PM

## 2021-07-02 VITALS
HEIGHT: 67 IN | WEIGHT: 97.9 LBS | TEMPERATURE: 97.6 F | OXYGEN SATURATION: 99 % | HEART RATE: 74 BPM | SYSTOLIC BLOOD PRESSURE: 115 MMHG | RESPIRATION RATE: 24 BRPM | BODY MASS INDEX: 15.37 KG/M2 | DIASTOLIC BLOOD PRESSURE: 70 MMHG

## 2021-07-02 LAB
ANION GAP SERPL CALCULATED.3IONS-SCNC: 5 MMOL/L (ref 7–16)
BASOPHILS ABSOLUTE: 0.09 E9/L (ref 0–0.2)
BASOPHILS RELATIVE PERCENT: 2.1 % (ref 0–2)
BUN BLDV-MCNC: 22 MG/DL (ref 6–20)
CALCIUM SERPL-MCNC: 9.6 MG/DL (ref 8.6–10.2)
CHLORIDE BLD-SCNC: 105 MMOL/L (ref 98–107)
CO2: 40 MMOL/L (ref 22–29)
CREAT SERPL-MCNC: 0.3 MG/DL (ref 0.7–1.2)
DOHLE BODIES: ABNORMAL
EOSINOPHILS ABSOLUTE: 0 E9/L (ref 0.05–0.5)
EOSINOPHILS RELATIVE PERCENT: 0 % (ref 0–6)
GFR AFRICAN AMERICAN: >60
GFR NON-AFRICAN AMERICAN: >60 ML/MIN/1.73
GLUCOSE BLD-MCNC: 159 MG/DL (ref 74–99)
HCT VFR BLD CALC: 35.9 % (ref 37–54)
HEMOGLOBIN: 11.3 G/DL (ref 12.5–16.5)
IMMATURE GRANULOCYTES #: 0.01 E9/L
IMMATURE GRANULOCYTES %: 0.2 % (ref 0–5)
LYMPHOCYTES ABSOLUTE: 0.27 E9/L (ref 1.5–4)
LYMPHOCYTES RELATIVE PERCENT: 6.4 % (ref 20–42)
MAGNESIUM: 2.1 MG/DL (ref 1.6–2.6)
MCH RBC QN AUTO: 28.6 PG (ref 26–35)
MCHC RBC AUTO-ENTMCNC: 31.5 % (ref 32–34.5)
MCV RBC AUTO: 90.9 FL (ref 80–99.9)
MONOCYTES ABSOLUTE: 0.09 E9/L (ref 0.1–0.95)
MONOCYTES RELATIVE PERCENT: 2.1 % (ref 2–12)
NEUTROPHILS ABSOLUTE: 3.78 E9/L (ref 1.8–7.3)
NEUTROPHILS RELATIVE PERCENT: 89.2 % (ref 43–80)
PDW BLD-RTO: 14.9 FL (ref 11.5–15)
PHOSPHORUS: 4.1 MG/DL (ref 2.5–4.5)
PLATELET # BLD: 67 E9/L (ref 130–450)
PLATELET CONFIRMATION: NORMAL
PMV BLD AUTO: 9 FL (ref 7–12)
POTASSIUM SERPL-SCNC: 3.6 MMOL/L (ref 3.5–5)
RBC # BLD: 3.95 E12/L (ref 3.8–5.8)
SODIUM BLD-SCNC: 150 MMOL/L (ref 132–146)
TOXIC GRANULATION: ABNORMAL
VACUOLATED NEUTROPHILS: ABNORMAL
WBC # BLD: 4.2 E9/L (ref 4.5–11.5)

## 2021-07-02 PROCEDURE — 2500000003 HC RX 250 WO HCPCS: Performed by: INTERNAL MEDICINE

## 2021-07-02 PROCEDURE — 2580000003 HC RX 258: Performed by: STUDENT IN AN ORGANIZED HEALTH CARE EDUCATION/TRAINING PROGRAM

## 2021-07-02 PROCEDURE — 84100 ASSAY OF PHOSPHORUS: CPT

## 2021-07-02 PROCEDURE — 6370000000 HC RX 637 (ALT 250 FOR IP): Performed by: STUDENT IN AN ORGANIZED HEALTH CARE EDUCATION/TRAINING PROGRAM

## 2021-07-02 PROCEDURE — 94640 AIRWAY INHALATION TREATMENT: CPT

## 2021-07-02 PROCEDURE — 2500000003 HC RX 250 WO HCPCS: Performed by: STUDENT IN AN ORGANIZED HEALTH CARE EDUCATION/TRAINING PROGRAM

## 2021-07-02 PROCEDURE — 80048 BASIC METABOLIC PNL TOTAL CA: CPT

## 2021-07-02 PROCEDURE — 6360000002 HC RX W HCPCS: Performed by: PHYSICIAN ASSISTANT

## 2021-07-02 PROCEDURE — 94669 MECHANICAL CHEST WALL OSCILL: CPT

## 2021-07-02 PROCEDURE — 6360000002 HC RX W HCPCS: Performed by: STUDENT IN AN ORGANIZED HEALTH CARE EDUCATION/TRAINING PROGRAM

## 2021-07-02 PROCEDURE — 85025 COMPLETE CBC W/AUTO DIFF WBC: CPT

## 2021-07-02 PROCEDURE — 99232 SBSQ HOSP IP/OBS MODERATE 35: CPT | Performed by: STUDENT IN AN ORGANIZED HEALTH CARE EDUCATION/TRAINING PROGRAM

## 2021-07-02 PROCEDURE — 6360000002 HC RX W HCPCS: Performed by: INTERNAL MEDICINE

## 2021-07-02 PROCEDURE — 36415 COLL VENOUS BLD VENIPUNCTURE: CPT

## 2021-07-02 PROCEDURE — 83735 ASSAY OF MAGNESIUM: CPT

## 2021-07-02 PROCEDURE — 2700000000 HC OXYGEN THERAPY PER DAY

## 2021-07-02 PROCEDURE — 94668 MNPJ CHEST WALL SBSQ: CPT

## 2021-07-02 PROCEDURE — 99239 HOSP IP/OBS DSCHRG MGMT >30: CPT | Performed by: INTERNAL MEDICINE

## 2021-07-02 PROCEDURE — 94660 CPAP INITIATION&MGMT: CPT

## 2021-07-02 RX ORDER — MORPHINE SULFATE 100 MG/5ML
5 SOLUTION ORAL
Qty: 15 ML | Refills: 0 | Status: SHIPPED | OUTPATIENT
Start: 2021-07-02 | End: 2021-07-05

## 2021-07-02 RX ORDER — LORAZEPAM 0.5 MG/1
0.5 TABLET ORAL EVERY 6 HOURS PRN
Qty: 12 TABLET | Refills: 0 | Status: SHIPPED | OUTPATIENT
Start: 2021-07-02 | End: 2021-07-05

## 2021-07-02 RX ORDER — OLANZAPINE 5 MG/1
5 TABLET ORAL NIGHTLY
Qty: 7 TABLET | Refills: 0 | Status: SHIPPED | OUTPATIENT
Start: 2021-07-02 | End: 2021-07-09

## 2021-07-02 RX ADMIN — LEVALBUTEROL HYDROCHLORIDE 0.63 MG: 0.63 SOLUTION RESPIRATORY (INHALATION) at 16:47

## 2021-07-02 RX ADMIN — HYDROMORPHONE HYDROCHLORIDE 0.5 MG: 1 INJECTION, SOLUTION INTRAMUSCULAR; INTRAVENOUS; SUBCUTANEOUS at 18:33

## 2021-07-02 RX ADMIN — HYDROMORPHONE HYDROCHLORIDE 0.5 MG: 1 INJECTION, SOLUTION INTRAMUSCULAR; INTRAVENOUS; SUBCUTANEOUS at 02:02

## 2021-07-02 RX ADMIN — LEVALBUTEROL HYDROCHLORIDE 0.63 MG: 0.63 SOLUTION RESPIRATORY (INHALATION) at 08:36

## 2021-07-02 RX ADMIN — ENOXAPARIN SODIUM 50 MG: 60 INJECTION SUBCUTANEOUS at 09:17

## 2021-07-02 RX ADMIN — METOPROLOL TARTRATE 2.5 MG: 5 INJECTION INTRAVENOUS at 09:04

## 2021-07-02 RX ADMIN — DOXYCYCLINE 100 MG: 100 INJECTION, POWDER, LYOPHILIZED, FOR SOLUTION INTRAVENOUS at 02:51

## 2021-07-02 RX ADMIN — METOPROLOL TARTRATE 2.5 MG: 5 INJECTION INTRAVENOUS at 14:13

## 2021-07-02 RX ADMIN — METHYLPREDNISOLONE SODIUM SUCCINATE 40 MG: 40 INJECTION, POWDER, LYOPHILIZED, FOR SOLUTION INTRAMUSCULAR; INTRAVENOUS at 09:04

## 2021-07-02 RX ADMIN — Medication 10 ML: at 09:17

## 2021-07-02 RX ADMIN — WATER 1000 MG: 1 INJECTION INTRAMUSCULAR; INTRAVENOUS; SUBCUTANEOUS at 09:18

## 2021-07-02 RX ADMIN — DOXYCYCLINE 100 MG: 100 INJECTION, POWDER, LYOPHILIZED, FOR SOLUTION INTRAVENOUS at 14:04

## 2021-07-02 RX ADMIN — FAMOTIDINE 20 MG: 20 TABLET ORAL at 09:04

## 2021-07-02 ASSESSMENT — PAIN SCALES - GENERAL
PAINLEVEL_OUTOF10: 6
PAINLEVEL_OUTOF10: 0
PAINLEVEL_OUTOF10: 3

## 2021-07-02 NOTE — PROGRESS NOTES
Pt spoke with hospice of the [place], will change code status to [facility]. Comfort meds ordered.

## 2021-07-02 NOTE — PROGRESS NOTES
Speech Language Pathology      NAME:  Héctor Slaughter  :  1960  DATE: 2021  ROOM:  [address] [place] [address]         Pt refused treatment at this time. Pt unavailable at this time due to:  [] HOLD per RN  [] Off unit for testing/ procedure     [] With medical staff   [x] Declined intervention  [] Sleeping/ Lethargic   [] Other:       Will re-attempt as able. Thank you. Acute on chronic respiratory failure with hypoxia [facility]) [place]ne   Clinician  [facility]    Carol GREENFIELD CCC/SLP [id number]  Speech-Language Pathologist No

## 2021-07-02 NOTE — PROGRESS NOTES
[address] [place] [address]     Liaison Information Visit Note              Patient Name: Irene Andrews   :  1960  MRN:  [id number]  [facility] date:  2021   Hospital Admitting Physician:  Akhil Rodriguez MD   PCP:  Andrey Daniels DO  Primary Insurance: Payor: [facility] /  /  /    Emergency Contact:   Contact/Relation:   Akira Gil [address] [place]/ [phone]  Advance Directive  Patient has NOT completed an advance directive  and Patient does not have a documented healthcare surrogate  Discussed with: Family member  DPOA-HC Name-Relation:    Phone:     Terminal Diagnosis Lung Cancer as confirmed by Dr. Desmond Neumann Problem List:   Patient Active Problem List   Diagnosis Code    Acute non-recurrent maxillary sinusitis J01.00    Small cell lung carcinoma ([address] C34.90    Acute on chronic respiratory failure with hypoxia ([address] J96.21    Severe protein-calorie malnutrition ([address] E43    Typical atrial flutter ([address] I48.3    Pneumonia of left lower lobe due to infectious organism J18.9    Pleural effusion J90       Code Status Order: DNR-CC     Allergies:  Patient has no known allergies. Family Goal: Comfort Care     Meeting held with Patient Ton Hurt, Daughter Deena Leos, and Constantin Joie    The hospice benefit and philosophy were explained including that hospice is end of life care in which, per Medicare, a patient has a terminal diagnosis that life expectancy would be 6 months or less. Hospice care is a service that is covered by most insurance plans. The following levels of hospice care were discussed including, routine level of hospice care at private home or facility, which patient/family is responsible for any room and board fees at the facility, and general in patient level of care (GIP) at the [facility] for short term symptom management.   Per Medicare guidelines, a patient is considered appropriate for GIP if they have uncontrolled symptoms such as pain, agitation, labored breathing or nausea/vomiting. Once symptoms become managed, the patient would need to be moved to a lower level of care such as home with hospice, ECF with hospice or the Transition program.  [place] [address] transition program also explained which is routine hospice care provided at the [place] [address] instead of an ECF or home. The transition program is private pay $300/day for room/board. Room/board for the transition program is not covered by Medicaid as would be in an ECF. Family informed that with the routine level of care at home or ECF,  the hospice team consists of the RN who visits 1-3 times a week, a  who visits within the first five days of the hospice election, the personal care team who visit 1-3 times a week, non-medical volunteers and Chaplains. Explained that at home in routine level of care, familles are responsible for the 24 hour care. Discussed that under hospice care patient would not receive chemotherapy, radiation, immune therapy, IV antibiotics, dialysis or blood transfusions. Explained that once in hospice care, all aggressive treatments would be stopped and allow nature to takes its course with focus on comfort care for the patient. Met at patient's bedside. Kassandra Dawson was confused and distracted during conversation but indicated he wanted to go home and his lines/tube disconnected. Emi Hutchinson and Samantha Almanza are understanding of hospice care at home and hospice philosophy. They would like patient changed to a DNR-CC and to proceed with taking patient home with hospice. DME needed and ordered: oxygen, hospital bed, wheelchair, bedside commode, and shower chair. Family does not want tube feedings ordered at home. Discussed comfort feeds and risk of aspiration. Medical director to follow. Paged Dr. Kiko Hurtado for verbal CTI. Palliative care wrote for comfort scripts, [facility] called, and pharmacy given billing information.    Set up transportation with physicians ambulance for 1800. Updated intake department and need for admission. Family will need educated on how to give comfort medications SL or via PEG. Patient having dyspnea and anxiety. Updated case management and charge nurse. Requested charge nurse obtain discharge order. Discharge Plan:  Discharge Disposition; home with hospice    [facility] plan:  1. Admit when home. 2. Please call [facility] [phone] with any questions. 3. Patient not currently under the care of hospice.     Electronically signed by Nickolas Sarah RN on 7/2/2021 at 11:36 AM

## 2021-07-02 NOTE — PROGRESS NOTES
Nutrition Note    Type and Reason for Visit:  Reassess      Nutrition Assessment:  DC plan is home with Hospice. Continue comfort measures. RD available per consult.       Electronically signed by Christa Spencer on 7/2/21 at 8:20 AM EDT    Contact: ext [phone]

## 2021-07-02 NOTE — PROGRESS NOTES
Spoke with dr Marian Gould and verbal permission received for pharmacy to dispense 30 ml of morphine 20mg/ml.

## 2021-07-02 NOTE — PROGRESS NOTES
Received a voicemail from daughter, Jersey Jones. She and her brother would like to meet with hospice in person this morning to discuss our services. They would like to get their father home as soon. Explained if we meet this morning should be able to have it arranged for this afternoon. Daughter Jersey Jones to call when they arrive at the hospital.  Called floor and requested permission for both Jersey Jones and her brother to come to the floor for family meeting with hospice.   Electronically signed by Pita Bedoya RN on 7/2/2021 at 9:59 AM

## 2021-07-02 NOTE — CARE COORDINATION
Discharge home w/ [facility] @ approx 7 pm- [facility] will set up transport. Made DNR-CC.  Erinn Holder RN case manager

## 2021-07-02 NOTE — DISCHARGE SUMMARY
[facility] EMERGENCY SERVICE Physician Discharge Summary        [address]  [phone]          Activity level: As tolerated    Diet: Diet NPO    Labs:    Dispo: Home with hospice    Condition at discharge: Guarded    Continue supplemental oxygen via nasal canula @ 2 LPM round-the-clock. Continue CPAP / BiPAP during sleep as prior to admission. Patient ID:  Ajay Murphy  [id number]  62 y.o.  1960    Admit date: 6/18/2021    Discharge date and time:  7/2/2021  2:19 PM    Admission Diagnoses: Active Problems:    Small cell lung carcinoma (HCC)    Acute on chronic respiratory failure with hypoxia (HCC)    Severe protein-calorie malnutrition (HCC)    Typical atrial flutter (HCC)    Pneumonia of left lower lobe due to infectious organism    Pleural effusion  Resolved Problems:    * No resolved hospital problems. *      Discharge Diagnoses: Active Problems:    Small cell lung carcinoma (HCC)    Acute on chronic respiratory failure with hypoxia (HCC)    Severe protein-calorie malnutrition (HCC)    Typical atrial flutter (HCC)    Pneumonia of left lower lobe due to infectious organism    Pleural effusion  Resolved Problems:    * No resolved hospital problems. *      Consults:  IP CONSULT TO PULMONOLOGY  IP CONSULT TO ONCOLOGY  IP CONSULT TO IV TEAM  IP CONSULT TO GENERAL SURGERY  IP CONSULT TO GENERAL SURGERY  IP CONSULT TO RADIATION ONCOLOGY  IP CONSULT TO PALLIATIVE CARE  IP CONSULT TO CARDIOLOGY  IP CONSULT TO IV TEAM  IP CONSULT TO PALLIATIVE CARE  IP CONSULT TO HOSPICE    Procedures: Placement of right-sided MediPort, gastrostomy tube. Hospital Course: Patient was admitted with Acute on chronic respiratory failure with hypoxia ([facility] [address] [J96.21]. Patient Admitted on 18th with shortness of breath, patient with small cell lung cancer, still smokes, admitted with acute on chronic respiratory failure with hypoxia.   As per oncology extensive small cell lung cancer, systemic therapy with carboplatin plus etoposide plus Tecentriq. Patient with moderate right pleural effusion. Also large perihilar mass. Covid negative. Pulmonology continued IV steroids and IV antibiotics. Left-sided aspiration pneumonia. Right-sided large malignant effusion. Patient had placement of right subclavian Mediport by surgery. Patient with dysphagia, likely secondary to mediastinal lymphadenopathy. Patient had a PET/CT on 15th which had shown mass centered in the right hilar region, metabolically active metastatic mediastinal lymphadenopathy, also in the supraclavicular region, also in pleural effusion, also in the lower mediastinal region compressing esophagus. Speech recommended n.p.o.  Recommended alternative source for nutrition. Patient had IR guided thoracentesis on 22nd with removal of 1.2 L. Patient did undergo open gastrostomy tube on 25th. Did go to ICU postop is hypotensive. Seen by cardiology due to persistent tachycardia, new onset A. fib, loaded with IV dig, not a good candidate for DC cardioversion. Surgery okay to start feedings as Gastrografin study confirmed the placement. Patient with agitation, pulled out PEG on 27th.  G-tube was replaced by surgery. Okay to use. Patient with new A. fib. AM PAC of 14/24. Patient does remain full code with poor prognosis. Cardiology recommending hospice. On amiodarone drip. Seen by palliative care, as per discussion by palliative care with her daughter they would like patient to go home and die peacefully. Plan for DC home with hospice. This was restarted following family meeting at 6 with son, daughter, palliative care. Hospice will  once patient discharged home. Patient is DNR CC now. At this time patient is awake, alert, does understand, he understand that management now is towards controlling symptoms, he also understands that he would be going home today.   We will continue with DC plans.    Discharge Exam:  Vitals:    07/01/21 2353 07/02/21 0215 07/02/21 0604 07/02/21 0902   BP:  99/70  100/76   Pulse:  81 87 132   Resp: 26 26  24   Temp:    97.5 °F (36.4 °C)   TempSrc:    Axillary   SpO2:  98%  91%   Weight:       Height:           General Appearance: alert and oriented to person, place and time, well-developed and well-nourished, in no acute distress  Skin: warm and dry, no rash or erythema  Head: normocephalic and atraumatic  Eyes: pupils equal, round, and reactive to light, extraocular eye movements intact, conjunctivae normal  ENT: tympanic membrane, external ear and ear canal normal bilaterally, oropharynx clear and moist with normal mucous membranes  Neck: neck supple and non tender without mass, no thyromegaly or thyroid nodules, no cervical lymphadenopathy   Pulmonary/Chest: clear to auscultation bilaterally- no wheezes, rales or rhonchi, normal air movement, no respiratory distress  Cardiovascular: normal rate, normal S1 and S2, no gallops, intact distal pulses and no carotid bruits  Abdomen: soft, non-tender, non-distended, normal bowel sounds, no masses or organomegaly  I/O last 3 completed shifts: In: 285 [NG/GT:285]  Out: 650 [Urine:650]  No intake/output data recorded. LABS:  Recent Labs     06/30/21  0520 07/01/21  0549 07/02/21  0552    146 150*   K 3.5 3.7 3.6    103 105   CO2 34* 36* 40*   BUN 18 20 22*   CREATININE 0.4* 0.3* 0.3*   GLUCOSE 87 125* 159*   CALCIUM 9.5 9.4 9.6       Recent Labs     06/30/21  0520 07/01/21  0420 07/02/21  0552   WBC 1.9* 2.5* 4.2*   RBC 3.80 3.79* 3.95   HGB 11.0* 10.8* 11.3*   HCT 33.9* 34.2* 35.9*   MCV 89.2 90.2 90.9   MCH 28.9 28.5 28.6   MCHC 32.4 31.6* 31.5*   RDW 14.3 14.7 14.9   PLT 68* 106* 67*   MPV 9.0 10.4 9.0       Imaging:   XR CHEST PORTABLE   Final Result   1. Persistent moderate large size right side pleural effusion with bi   basilar infiltrates.   See report CT chest of June 28.      2.  Status post recent abdominal surgery, free intraperitoneal air observed. The      3. The right axillar region subcutaneous emphysema. CT ABDOMEN PELVIS W IV CONTRAST Additional Contrast? None   Final Result   Moderate amount of free air, improved from previous. Given history of recent   surgery, this is presumably postoperative. Bowel perforation thought less   likely. Gastrostomy tube is within the stomach without extravasated contrast.      Partial visualization of right pleural effusion. Extensive consolidative opacities involving the right greater than left lower   lobe. Infiltrates from pneumonia should be considered. Continued follow-up   recommended. Minimal free fluid again identified in the abdomen/pelvis. Other findings are also similar to previous. CT HEAD WO CONTRAST   Final Result   No acute intracranial abnormality. XR ABDOMEN FOR NG/OG/NE TUBE PLACEMENT   Final Result   Extravasation of contrast following Gastrografin injection suggesting   abnormal position of gastrostomy tube. XR ABDOMEN FOR NG/OG/NE TUBE PLACEMENT   Final Result   Tip of the gastric tube in proper position. Recent postoperative changes as above commented. CTA CHEST W CONTRAST   Final Result   No evidence of pulmonary embolism. Again noted are multiple areas of consolidation and scattered patchy   opacities, progressed since the previous exam and is concerning for   multifocal airspace disease process. Moderate size right pleural effusion with pleural metastatic implants are   again noted. Large right perihilar mass with extension to the mediastinum is again noted. Significant mediastinal lymphadenopathy including supraclavicular lymph nodes   is again noted suggestive of disease extension. CT ABDOMEN PELVIS WO CONTRAST Additional Contrast? None   Final Result   Extensive postoperative free air in the abdomen.       Air also identified in the anterior abdominal wall subcutaneous tissues. Consolidation in the bilateral lung bases may be secondary to atelectasis   postoperative versus superimposed pneumonia in the proper clinical setting. Partially imaged right-sided pleural effusion. FL MODIFIED BARIUM SWALLOW W VIDEO   Final Result   Positive aspiration to thin and nectar consistencies of barium contrast were. Please see separate speech pathology report for full discussion of findings   and recommendations. US THORACENTESIS Which side should the procedure be performed? Right   Final Result   Successful ultrasound guided thoracentesis. XR CHEST PORTABLE   Final Result   Right-sided chest port in good position. No pneumothorax. Moderate right-sided pleural effusions unchanged. FLUORO FOR SURGICAL PROCEDURES   Final Result   Intraprocedural fluoroscopic spot images as above. See separate procedure   report for more information. XR CHEST PORTABLE   Final Result   Stable abnormal chest with the mediastinal and hilar adenopathy with soft   tissue mass in the right hilum concerning for malignancy. Persistent bibasilar infiltrates and pleural effusion likely pneumonia or   edema. CTA CHEST W CONTRAST   Final Result   1. There is no evidence of a pulmonary embolus. 2. Moderate size right pleural effusion   3. Advanced emphysematous changes with superimposed pneumonia seen within the   left lower lobe and within the lingula. This finding is new when compared   with the previous CT scan of 06/11/2021 and PET-CT scan of 06/16/2021.   4. Large right perihilar mass with extension into the mediastinum consistent   with the history of metastatic small cell lung CA   5. Significant right hilar, mediastinal core right paratracheal and supra and   infraclavicular lymphadenopathy. 6. Pleural metastasis.   Chronically distended esophagus secondary to the   metastatic lymphadenopathy within the inferior posterior mediastinum. XR CHEST PORTABLE   Final Result   No significant change             Patient Instructions:      Medication List      START taking these medications    LORazepam 0.5 MG tablet  Commonly known as: Ativan  Take 1 tablet by mouth every 6 hours as needed for Anxiety for up to 3 days. metoprolol tartrate 25 MG tablet  Commonly known as: LOPRESSOR  Take 1 tablet by mouth 2 times daily for 7 days     morphine sulfate 20 MG/ML concentrated oral solution  0.25 mLs by PEG Tube route every 2 hours as needed for Pain for up to 3 days. OLANZapine 5 MG tablet  Commonly known as: ZYPREXA  Take 1 tablet by mouth nightly for 7 days        CONTINUE taking these medications    ondansetron 4 MG disintegrating tablet  Commonly known as: ZOFRAN-ODT  Take 1 tablet by mouth every 8 hours as needed for Nausea or Vomiting        STOP taking these medications    acetaminophen 500 MG tablet  Commonly known as: TYLENOL     methylPREDNISolone 4 MG tablet  Commonly known as: MEDROL DOSEPACK           Where to Get Your Medications      These medications were sent to [facility], [address] [place] [address], [facility] [place] [address]    Phone: [phone]   · LORazepam 0.5 MG tablet  · metoprolol tartrate 25 MG tablet  · morphine sulfate 20 MG/ML concentrated oral solution  · OLANZapine 5 MG tablet           Note that more than 30 minutes was spent in preparing discharge papers, discussing discharge with patient, medication review, etc.    Signed:  Electronically signed by Nicholas Rick MD on 7/2/2021 at 2:19 PM    NOTE: This report was transcribed using voice recognition software. Every effort was made to ensure accuracy; however, inadvertent computerized transcription errors may be present.

## 2021-07-06 LAB
AFB CULTURE (MYCOBACTERIA): NORMAL
AFB SMEAR: NORMAL
MISCELLANEOUS LAB TEST ORDER: NORMAL

## 2021-07-07 ENCOUNTER — HOSPITAL ENCOUNTER (OUTPATIENT)
Dept: INFUSION THERAPY | Age: 61
Discharge: HOME | End: 2021-07-07
Payer: COMMERCIAL

## 2021-07-07 NOTE — PROGRESS NOTES
CLINICAL PHARMACY NOTE: MEDS TO BEDS    Total # of Prescriptions Filled: 3   The following medications were delivered to the patient:  · Olanzapine 5  · Lorazepam 0.5  · Morphine sulfate    Additional Documentation:

## 2021-07-08 ENCOUNTER — HOSPITAL ENCOUNTER (OUTPATIENT)
Dept: INFUSION THERAPY | Age: 61
End: 2021-07-08
Payer: COMMERCIAL

## 2021-07-09 ENCOUNTER — HOSPITAL ENCOUNTER (OUTPATIENT)
Dept: INFUSION THERAPY | Age: 61
End: 2021-07-09

## 2021-07-20 LAB
AFB CULTURE (MYCOBACTERIA): NORMAL
AFB SMEAR: NORMAL

## 2022-01-05 NOTE — PROGRESS NOTES
Spoke with daughter,purnima. Notified that metoprolol can be picked up at [address] in [place]. details… detailed exam left wrist, fingers warm and mobile with edema/no calf tenderness/decreased ROM due to pain
